# Patient Record
Sex: FEMALE | Race: BLACK OR AFRICAN AMERICAN | NOT HISPANIC OR LATINO | Employment: FULL TIME | URBAN - METROPOLITAN AREA
[De-identification: names, ages, dates, MRNs, and addresses within clinical notes are randomized per-mention and may not be internally consistent; named-entity substitution may affect disease eponyms.]

---

## 2017-01-03 ENCOUNTER — PATIENT OUTREACH (OUTPATIENT)
Dept: ADMINISTRATIVE | Facility: CLINIC | Age: 52
End: 2017-01-03
Payer: COMMERCIAL

## 2017-01-03 NOTE — PATIENT INSTRUCTIONS
Pneumonia (Adult)  Pneumonia is an infection deep within the lungs. It is in the small air sacs (alveoli). Pneumonia may be caused by a virus or bacteria. Pneumonia caused by bacteria is usually treated with an antibiotic. Severe cases may need to be treated in the hospital. Milder cases can be treated at home. Symptoms usually start to get better during the first 2 days of treatment.    Home care  Follow these guidelines when caring for yourself at home:  · Rest at home for the first 2 to 3 days, or until you feel stronger. Dont let yourself get overly tired when you go back to your activities.  · Stay away from cigarette smoke - yours or other peoples.  · You may use acetaminophen or ibuprofen to control fever or pain, unless another medicine was prescribed. If you have chronic liver or kidney disease, talk with your health care provider before using these medicines. Also talk with your provider if youve had a stomach ulcer or GI bleeding. Dont give aspirin to anyone younger than 18 years of age who is ill with a fever. It may cause severe liver damage.  · Your appetite may be poor, so a light diet is fine.  · Drink 6 to 8 glasses of fluids every day to make sure you are getting enough fluids. Beverages can include water, sport drinks, sodas without caffeine, juices, tea, or soup. Fluids will help loosen secretions in the lung. This will make it easier for you to cough up the phlegm (sputum). If you also have heart or kidney disease, check with your health care provider before you drink extra fluids.  · Take antibiotic medicine prescribed until it is all gone, even if you are feeling better after a few days.  Follow-up care  Follow up with your health care provider in the next 2 to 3 days, or as advised. This is to be sure the medicine is helping you get better.  If you are 65 or older, you should get a pneumococcal vaccine and a yearly flu (influenza) shot. You should also get these vaccines if you have  chronic lung disease like asthma, emphysema, or COPD. Ask your provider about this.  When to seek medical advice  Call your health care provider right away if any of these occur:  · You dont get better within the first 48 hours of treatment  · Shortness of breath gets worse  · Rapid breathing (more than 25 breaths per minute)  · Coughing up blood  · Chest pain gets worse with breathing  · Fever of 102°F (38°C) or higher that doesnt get better with fever medicine  · Weakness, dizziness, or fainting that gets worse  · Thirst or dry mouth that gets worse  · Sinus pain, headache, or a stiff neck  · Chest pain not caused by coughing  © 1493-5588 [employer]. [address] [place] [address], [place] [address]. All rights reserved. This information is not intended as a substitute for professional medical care. Always follow your healthcare professional's instructions.

## 2017-01-05 ENCOUNTER — TELEPHONE (OUTPATIENT)
Dept: INTERNAL MEDICINE | Facility: CLINIC | Age: 52
End: 2017-01-05

## 2017-01-05 NOTE — TELEPHONE ENCOUNTER
Please forward this important TCC information to your provider in order to maximize the post discharge care delivery of this patient.     C3 nurse spoke with Pastora El Cipriano for a TCC post hospital discharge follow up call. The patient has a scheduled HOSFU appointment with Dr. Koch on 01/09/17 @ 1100.       Respectfully,   Ashley Ceron, YU   [facility]     [email or web address]       Pt has 2 wk f/u with Dr. Koch    Please auth if nec

## 2017-01-09 ENCOUNTER — OFFICE VISIT (OUTPATIENT)
Dept: INTERNAL MEDICINE | Facility: CLINIC | Age: 52
End: 2017-01-09
Attending: INTERNAL MEDICINE
Payer: COMMERCIAL

## 2017-01-09 VITALS
SYSTOLIC BLOOD PRESSURE: 128 MMHG | WEIGHT: 150.56 LBS | HEIGHT: 65 IN | DIASTOLIC BLOOD PRESSURE: 78 MMHG | OXYGEN SATURATION: 97 % | BODY MASS INDEX: 25.08 KG/M2 | HEART RATE: 114 BPM

## 2017-01-09 DIAGNOSIS — J91.8 PARAPNEUMONIC EFFUSION: Primary | ICD-10-CM

## 2017-01-09 DIAGNOSIS — J18.9 PARAPNEUMONIC EFFUSION: Primary | ICD-10-CM

## 2017-01-09 DIAGNOSIS — J15.9 COMMUNITY ACQUIRED BACTERIAL PNEUMONIA: ICD-10-CM

## 2017-01-09 PROCEDURE — 99999 PR PBB SHADOW E&M-EST. PATIENT-LVL III: CPT | Mod: PBBFAC,,, | Performed by: INTERNAL MEDICINE

## 2017-01-09 PROCEDURE — 99213 OFFICE O/P EST LOW 20 MIN: CPT | Mod: S$GLB,,, | Performed by: INTERNAL MEDICINE

## 2017-01-09 PROCEDURE — 1159F MED LIST DOCD IN RCRD: CPT | Mod: S$GLB,,, | Performed by: INTERNAL MEDICINE

## 2017-01-09 PROCEDURE — 3078F DIAST BP <80 MM HG: CPT | Mod: S$GLB,,, | Performed by: INTERNAL MEDICINE

## 2017-01-09 PROCEDURE — 3074F SYST BP LT 130 MM HG: CPT | Mod: S$GLB,,, | Performed by: INTERNAL MEDICINE

## 2017-01-09 RX ORDER — AMLODIPINE BESYLATE 5 MG/1
TABLET ORAL
COMMUNITY
Start: 2017-01-05 | End: 2017-01-26

## 2017-01-09 NOTE — MR AVS SNAPSHOT
[facility]  [address]  [facility] [address]  Phone: [phone]  Fax: [phone]                  Pastora Cota   2017 11:00 AM   Office Visit    Description:  Female : 1965   Provider:  Kiko Koch MD   Department:  [facility]           Reason for Visit     Cough           Diagnoses this Visit        Comments    Need for hepatitis C screening test    -  Primary     Pap smear for cervical cancer screening         Hyperlipidemia, unspecified hyperlipidemia type                To Do List           Future Appointments        Provider Department Dept Phone    3/6/2017 5:00 PM LAB, APPOINTMENT [facility] [phone]      Your Future Surgeries/Procedures     2017   Surgery with Jhonny Clayton MD   [facility] ([facility])    [address] [facility] [address] [phone]              Goals (5 Years of Data)     None      [facility]     [facility] Nurse Care Line -  Assistance  Registered nurses in the [facility] provide clinical advisement, health education, appointment booking, and other advisory services.  Call for this free service at [phone].             Medications           Message regarding Medications     Verify the changes and/or additions to your medication regime listed below are the same as discussed with your clinician today.  If any of these changes or additions are incorrect, please notify your healthcare provider.             Verify that the below list of medications is an accurate representation of the medications you are currently taking.  If none reported, the list may be blank. If incorrect, please contact your healthcare provider. Carry this list with you in case of emergency.           Current Medications     amlodipine (NORVASC) 10 MG tablet Take 1 tablet (10 mg total) by mouth once daily.    amlodipine  "(NORVASC) 5 MG tablet     hydrocortisone butyrate (LOCOID) 0.1 % Crea cream AAA face bid    lactobacillus acidophilus & bulgar (LACTINEX) 100 million cell packet Take 1 packet (1 each total) by mouth 2 (two) times daily.    moxifloxacin (AVELOX) 400 mg tablet Take 1 tablet (400 mg total) by mouth once daily.    triamcinolone acetonide 0.1% (KENALOG) 0.1 % cream AAA bid    epinephrine (EPIPEN) 0.3 mg/0.3 mL AtIn Inject 0.3 mLs (0.3 mg total) into the muscle as needed (extreme swelling, difficulty swallowing, voice changes or trouble breathing).           Clinical Reference Information           Vital Signs - Last Recorded  Most recent update: 1/9/2017 10:59 AM by Daniela Taylor MA    BP Pulse Ht Wt SpO2 BMI    128/78 (!) 114 5' 5" (1.651 m) 68.3 kg (150 lb 9.2 oz) 97% 25.06 kg/m2      Blood Pressure          Most Recent Value    BP  128/78      Allergies as of 1/9/2017     Augmentin [Amoxicillin-pot Clavulanate]      Immunizations Administered on Date of Encounter - 1/9/2017     None      "

## 2017-01-09 NOTE — PROGRESS NOTES
"Subjective:       Patient ID: Pastora Cota is a 51 y.o. female.    Chief Complaint: Cough    HPI Comments: Here for hospital f/u    Admitted 12/19/16-12/30/16 for CAP with hypoxemia and para pneumonic effusion s/p thoracentesis and chest tube placement. Discharged on 2 week course of moxifloxacin   Continues to get some pain with coughing. Had a fever a few days after discharge but has not had any since. Had appointment with Dr. Mcbride and had CXR and there is some residual fluid but no signs of re accumulation. Gets fatigued with. Has f/u in 9 days to see Dr. Mcbride for f/u after completion of antibiotics. She request today that I fill out disability paperwork for her for recent hospitalization.       Review of Systems       Objective:      Vitals:    01/09/17 1056   BP: 128/78   Pulse: (!) 114   SpO2: 97%   Weight: 68.3 kg (150 lb 9.2 oz)   Height: 5' 5" (1.651 m)      Physical Exam   Constitutional: She is oriented to person, place, and time. She appears well-developed and well-nourished. No distress.   HENT:   Head: Normocephalic and atraumatic.   Mouth/Throat: Oropharynx is clear and moist. No oropharyngeal exudate.   Eyes: Conjunctivae and EOM are normal. Pupils are equal, round, and reactive to light. No scleral icterus.   Neck: No thyromegaly present.   Cardiovascular: Normal rate, regular rhythm and normal heart sounds.    No murmur heard.  Pulmonary/Chest: Effort normal and breath sounds normal. No accessory muscle usage. No respiratory distress. She has no decreased breath sounds. She has no wheezes. She has no rales.   Abdominal: Soft. She exhibits no distension. There is no tenderness.   Musculoskeletal: She exhibits no edema or tenderness.   Lymphadenopathy:     She has no cervical adenopathy.   Neurological: She is alert and oriented to person, place, and time.   Skin: Skin is warm and dry.   Psychiatric: She has a normal mood and affect. Her behavior is normal.       Assessment:       1. " Parapneumonic effusion    2. Community acquired bacterial pneumonia        Plan:       Pastora was seen today for cough.    Diagnoses and all orders for this visit:    Parapneumonic effusion  -s/p thoracentesis. No signs of reacumulation. F/u with pulm as scheduled    Community acquired bacterial pneumonia  Improving. Complete Abx.             Side effects of medication(s) were discussed in detail and patient voiced understanding.  Patient will call back for any issues or complications.

## 2017-01-26 ENCOUNTER — OFFICE VISIT (OUTPATIENT)
Dept: INTERNAL MEDICINE | Facility: CLINIC | Age: 52
End: 2017-01-26
Attending: INTERNAL MEDICINE
Payer: COMMERCIAL

## 2017-01-26 ENCOUNTER — HOSPITAL ENCOUNTER (OUTPATIENT)
Dept: CARDIOLOGY | Facility: OTHER | Age: 52
Discharge: HOME | End: 2017-01-26
Attending: INTERNAL MEDICINE
Payer: COMMERCIAL

## 2017-01-26 VITALS
HEART RATE: 113 BPM | HEIGHT: 65 IN | SYSTOLIC BLOOD PRESSURE: 136 MMHG | OXYGEN SATURATION: 96 % | BODY MASS INDEX: 24.98 KG/M2 | WEIGHT: 149.94 LBS | DIASTOLIC BLOOD PRESSURE: 88 MMHG

## 2017-01-26 DIAGNOSIS — D50.9 MICROCYTIC ANEMIA: Primary | ICD-10-CM

## 2017-01-26 DIAGNOSIS — J15.9 COMMUNITY ACQUIRED BACTERIAL PNEUMONIA: ICD-10-CM

## 2017-01-26 DIAGNOSIS — J91.8 PARAPNEUMONIC EFFUSION: ICD-10-CM

## 2017-01-26 DIAGNOSIS — R00.0 TACHYCARDIA: ICD-10-CM

## 2017-01-26 DIAGNOSIS — J18.9 PARAPNEUMONIC EFFUSION: ICD-10-CM

## 2017-01-26 PROCEDURE — 99999 PR PBB SHADOW E&M-EST. PATIENT-LVL III: CPT | Mod: PBBFAC,,, | Performed by: INTERNAL MEDICINE

## 2017-01-26 PROCEDURE — 1159F MED LIST DOCD IN RCRD: CPT | Mod: S$GLB,,, | Performed by: INTERNAL MEDICINE

## 2017-01-26 PROCEDURE — 93005 ELECTROCARDIOGRAM TRACING: CPT

## 2017-01-26 PROCEDURE — 93010 ELECTROCARDIOGRAM REPORT: CPT | Mod: ,,, | Performed by: INTERNAL MEDICINE

## 2017-01-26 PROCEDURE — 3079F DIAST BP 80-89 MM HG: CPT | Mod: S$GLB,,, | Performed by: INTERNAL MEDICINE

## 2017-01-26 PROCEDURE — 99214 OFFICE O/P EST MOD 30 MIN: CPT | Mod: S$GLB,,, | Performed by: INTERNAL MEDICINE

## 2017-01-26 PROCEDURE — 3075F SYST BP GE 130 - 139MM HG: CPT | Mod: S$GLB,,, | Performed by: INTERNAL MEDICINE

## 2017-01-26 RX ORDER — MOXIFLOXACIN HYDROCHLORIDE 400 MG/1
TABLET ORAL
COMMUNITY
Start: 2017-01-18 | End: 2017-02-16

## 2017-01-26 NOTE — PROGRESS NOTES
"Subjective:       Patient ID: Pastora Cota is a 51 y.o. female.    Chief Complaint: Pneumonia    HPI Comments: Here for f/u    Seen by me on 1/9/17 for hospital f/u after para pneumonic effusion requiring thoracotomy. After seeing me she developed low grade fevers and had f/u with Dr. Mcbride on 1/18/17 and started on another round of Abx which she completed yesterday. She still has some pain with coughing but overall breathing improved and no longer having fevers. She reports continued fatigue due to hospitalization. Dr. Mcbride gave her okay for light walking to work on strengthening.      Review of Systems    Objective:      Vitals:    01/26/17 1325   BP: 136/88   Pulse: (!) 113   SpO2: 96%   Weight: 68 kg (149 lb 14.6 oz)   Height: 5' 5" (1.651 m)      Physical Exam   Constitutional: She is oriented to person, place, and time. She appears well-developed and well-nourished. She does not have a sickly appearance. No distress.   HENT:   Head: Normocephalic and atraumatic.   Eyes: Conjunctivae and EOM are normal. Right eye exhibits no discharge. Left eye exhibits no discharge. No scleral icterus.   Pulmonary/Chest: Effort normal. No respiratory distress.   Abdominal: Normal appearance. She exhibits no distension.   Neurological: She is alert and oriented to person, place, and time.   Skin: Skin is warm and dry. She is not diaphoretic.   Psychiatric: She has a normal mood and affect. Her speech is normal.       Assessment:       1. Microcytic anemia    2. Tachycardia    3. Parapneumonic effusion    4. Community acquired bacterial pneumonia        Plan:       Pastora was seen today for pneumonia.    Diagnoses and all orders for this visit:    Microcytic anemia  -she is due for colonoscopy but she postponed this due to recent events  -     Ferritin; Future  -     Iron and TIBC; Future  -     CBC auto differential; Future  -     Reticulocytes; Future  -     Lactate dehydrogenase; Future  -     HAPTOGLOBIN; " Future    Tachycardia  -     SCHEDULED EKG 12-LEAD (to Muse); Future    Parapneumonic effusion  -out of work extended    Community acquired bacterial pneumonia           Side effects of medication(s) were discussed in detail and patient voiced understanding.  Patient will call back for any issues or complications.

## 2017-01-26 NOTE — MR AVS SNAPSHOT
[facility]  [address]  [place] [address]  Phone: [phone]  Fax: [phone]                  Pastora Cota   2017 1:20 PM   Office Visit    Description:  Female : 1965   Provider:  Kiko Koch MD   Department:  [facility]           Reason for Visit     Pneumonia           Diagnoses this Visit        Comments    Microcytic anemia    -  Primary     Tachycardia                To Do List           Future Appointments        Provider Department Dept Phone    3/6/2017 5:00 PM LAB, APPOINTMENT [facility] [phone]      Goals (5 Years of Data)     None      [facility]     [facility] Nurse Care Line -  Assistance  Registered nurses in the [facility] provide clinical advisement, health education, appointment booking, and other advisory services.  Call for this free service at [phone].             Medications           Message regarding Medications     Verify the changes and/or additions to your medication regime listed below are the same as discussed with your clinician today.  If any of these changes or additions are incorrect, please notify your healthcare provider.        STOP taking these medications     lactobacillus acidophilus & bulgar (LACTINEX) 100 million cell packet Take 1 packet (1 each total) by mouth 2 (two) times daily.           Verify that the below list of medications is an accurate representation of the medications you are currently taking.  If none reported, the list may be blank. If incorrect, please contact your healthcare provider. Carry this list with you in case of emergency.           Current Medications     amlodipine (NORVASC) 10 MG tablet Take 1 tablet (10 mg total) by mouth once daily.    epinephrine (EPIPEN) 0.3 mg/0.3 mL AtIn Inject 0.3 mLs (0.3 mg total) into the muscle as needed (extreme swelling, difficulty swallowing, voice changes or  "trouble breathing).    hydrocortisone butyrate (LOCOID) 0.1 % Crea cream AAA face bid    moxifloxacin (AVELOX) 400 mg tablet     triamcinolone acetonide 0.1% (KENALOG) 0.1 % cream AAA bid           Clinical Reference Information           Vital Signs - Last Recorded  Most recent update: 1/26/2017  1:28 PM by Daniela Taylor MA    BP Pulse Ht Wt SpO2 BMI    136/88 (!) 113 5' 5" (1.651 m) 68 kg (149 lb 14.6 oz) 96% 24.95 kg/m2      Blood Pressure          Most Recent Value    BP  136/88      Allergies as of 1/26/2017     Augmentin [Amoxicillin-pot Clavulanate]      Immunizations Administered on Date of Encounter - 1/26/2017     None      Orders Placed During Today's Visit     Future Labs/Procedures Expected by Expires    CBC auto differential  1/26/2017 1/26/2018    Ferritin  1/26/2017 4/26/2017    HAPTOGLOBIN  1/26/2017 3/27/2018    Iron and TIBC  1/26/2017 4/26/2017    Lactate dehydrogenase  1/26/2017 3/27/2018    Reticulocytes  1/26/2017 3/27/2018    SCHEDULED EKG 12-LEAD (to Muse)  As directed 1/26/2018      "

## 2017-02-14 ENCOUNTER — OFFICE VISIT (OUTPATIENT)
Dept: ALLERGY | Facility: CLINIC | Age: 52
End: 2017-02-14
Payer: COMMERCIAL

## 2017-02-14 VITALS
HEIGHT: 65 IN | WEIGHT: 147 LBS | BODY MASS INDEX: 24.49 KG/M2 | TEMPERATURE: 99 F | DIASTOLIC BLOOD PRESSURE: 78 MMHG | SYSTOLIC BLOOD PRESSURE: 128 MMHG

## 2017-02-14 DIAGNOSIS — L30.9 DERMATITIS: Primary | ICD-10-CM

## 2017-02-14 PROCEDURE — 3078F DIAST BP <80 MM HG: CPT | Mod: S$GLB,,, | Performed by: ALLERGY & IMMUNOLOGY

## 2017-02-14 PROCEDURE — 3074F SYST BP LT 130 MM HG: CPT | Mod: S$GLB,,, | Performed by: ALLERGY & IMMUNOLOGY

## 2017-02-14 PROCEDURE — 99214 OFFICE O/P EST MOD 30 MIN: CPT | Mod: S$GLB,,, | Performed by: ALLERGY & IMMUNOLOGY

## 2017-02-14 PROCEDURE — 99999 PR PBB SHADOW E&M-EST. PATIENT-LVL II: CPT | Mod: PBBFAC,,, | Performed by: ALLERGY & IMMUNOLOGY

## 2017-02-14 NOTE — PROGRESS NOTES
Pastora Cota returns to clinic today for continued evaluation of a rash. She is here with a friend, Dora. She was last seen December 5, 2016.    After her last visit, she continued to use the cream that Dr. Oh gave her. The rash did resolve after about 2 weeks.    She also diagnosed her with seborrheic dermatitis.    She has not had any further urticaria or angioedema.    Last Tuesday she ate crawfish, crab claws, and ate gumbo that contained shrimp and crab.    On Friday she ate seafood pasta that contained shrimp.    On Saturday she ate leftovers of this.    On Sunday she has some slight itching of her face.    On Monday this increased when she was at work and she took 2 Benadryl.    This morning around 1:30 AM she woke up with burning and itching of her face. It was erythematous and swollen. She took 2 Benadryl and it improved.    She denies any rhinitis or conjunctivitis.    She denies any cough, wheezing, or shortness of breath.    [facility] PEQ ALLERGY QUESTIONNAIRE SHORT 2/14/2017   Are you taking any new medications since your last visit? No   Constitution: Appetite change   Head or facial pain: Headaches   Eyes: Itching   Ears: Itching   Nose: No symptoms   Throat: No symptoms   Sinuses: No symptoms   Lungs: No symptoms   Skin: Itching, Hives, Swelling   Cardiovascular: No symptoms   Gastrointestinal: No symptoms   Genital/ urinary No symptoms   Musculoskeletal: No symptoms   Neurologic: Headaches   Endocrine: Headaches   Hematologic: No symptoms      Physical Examination:  General: Well-developed, well-nourished, no acute distress.  Head: No sinus tenderness.  Eyes: Conjunctivae:  No bulbar or palpebral conjunctival injection.  Ears: EAC's clear.  TM's clear.  No pre-auricular nodes.  Nose: Nasal Mucosa:  Pink.  Septum: No apparent deviation.  Turbinates:  No significant edema.  Polyps/Mass:  None visible.  Teeth/Gums:  No bleeding noted.  Oropharynx: No exudates.  Neck: Supple without  thyromegaly. No cervical lymphadenopathy.    Respiratory/Chest: Effort: Good.  Auscultation:  Clear bilaterally.  Skin: Good turgor.  No urticaria or angioedema. Erythematous raised rash across face.  Neuro/Psych: Oriented x 3.    Laboratory 11/28/2016:  IgE level: Less than 35.  ImmunoCAP: Negative.  Thyroid peroxidase antibody level: Less than 6.0.  Thyroglobulin antibody level: Less than 4.0.  Vitamin D level: 33.    Assessment:  1. Seborrheic dermatitis, doubt food allergy.  2. Consider drug reaction, Augmentin.  3. Angioedema of uncertain etiology, consider secondary to ACE inhibitor.  4. Chronic rhinitis, doubt allergic.  5. Hypertension on hydrochlorothiazide and amlodipine.  6. Hyperlipidemia.  7. Colitis on sulfasalazine.  8. Multinodular thyroid disease.  9. Migraine headaches.    Recommendations:  1. Dermatology follow-up.  2. She may restart creams prescribed by Dr. Oh until then.  3. Benadryl as needed.  4. Consider penicillin skin testing in the future.  5. Consider inhalant skin testing in the future if rhinitis persists.  6. Laboratory as ordered. [facility] will [facility].  7. Return to clinic as needed.

## 2017-02-16 ENCOUNTER — OFFICE VISIT (OUTPATIENT)
Dept: DERMATOLOGY | Facility: CLINIC | Age: 52
End: 2017-02-16
Payer: COMMERCIAL

## 2017-02-16 DIAGNOSIS — R21 RASH AND NONSPECIFIC SKIN ERUPTION: Primary | ICD-10-CM

## 2017-02-16 PROCEDURE — 99213 OFFICE O/P EST LOW 20 MIN: CPT | Mod: 25,S$GLB,, | Performed by: PATHOLOGY

## 2017-02-16 PROCEDURE — 11100 PR BIOPSY OF SKIN LESION: CPT | Mod: S$GLB,,, | Performed by: PATHOLOGY

## 2017-02-16 PROCEDURE — 11101 PR BIOPSY, EACH ADDED LESION: CPT | Mod: S$GLB,,, | Performed by: PATHOLOGY

## 2017-02-16 PROCEDURE — 99999 PR PBB SHADOW E&M-EST. PATIENT-LVL III: CPT | Mod: PBBFAC,,, | Performed by: PATHOLOGY

## 2017-02-16 PROCEDURE — 88305 TISSUE EXAM BY PATHOLOGIST: CPT | Performed by: PATHOLOGY

## 2017-02-16 RX ORDER — PREDNISONE 20 MG/1
TABLET ORAL
Qty: 30 TABLET | Refills: 0 | Status: SHIPPED | OUTPATIENT
Start: 2017-02-16 | End: 2017-09-20

## 2017-02-16 NOTE — LETTER
February 16, 2017      ETELVINA Benitez III, MD  [address]  [place] [address]           [facility]  [address]  [place] [address]  Phone: [phone]  Fax: [phone]          Patient: Pastora Cota   MR Number: [id number]   YOB: 1965   Date of Visit: 2/16/2017       Dear Dr. ETELVINA Benitez III:    Thank you for referring Pastora Cota to me for evaluation. Attached you will find relevant portions of my assessment and plan of care.    If you have questions, please do not hesitate to call me. I look forward to following Pastora Cota along with you.    Sincerely,    Alyson Bernardo MD    Enclosure  CC:  No Recipients    If you would like to receive this communication electronically, please contact [email or web address] or [phone] to request more information on [facility] access.    For providers and/or their staff who would like to refer a patient to [facility], please contact us through our one-stop-shop provider referral line, [facility], at [phone].    If you feel you have received this communication in error or would no longer like to receive these types of communications, please e-mail [email or web address]

## 2017-02-16 NOTE — PROGRESS NOTES
Subjective:       Patient ID:  Pastora Cota is a 51 y.o. female who presents for   Chief Complaint   Patient presents with    Rash     Face and arms, x 2 days, swelling, redness, itching, benadryl for treatment      Rash  - Initial  Affected locations: face, left arm and right arm  Duration: 2 days  Signs / symptoms: swelling, itching and redness  Aggravated by: nothing  Treatments tried: Benadryl   Improvement on treatment: mild    Pt with h/o suspected drug eruption secondary to Augmentin per Dr. Oh's notes - cleared on topical and systemic steroids.  Also h/o urticaria and angioedema - seen by Dr. Benitez who does not believe this to be related to a food allergy.  Of note, pt on amlodipine for about 4 years.  No new exposures, personal care products, hair dye, etc.  No new meds.  Current flare started Tuesday morning to face - has spread to arms, neck, upper chest, lower legs.  +pruritus, redness, swelling.      Review of Systems   Constitutional: Negative for fever, chills, weight loss, weight gain, fatigue, night sweats and malaise.   Skin: Positive for itching, rash, dry skin, daily sunscreen use and activity-related sunscreen use. Negative for recent sunburn.   Hematologic/Lymphatic: Does not bruise/bleed easily.        Objective:    Physical Exam   Constitutional: She appears well-developed and well-nourished.   Neurological: She is alert.   Skin:   Areas Examined (abnormalities noted in diagram):   Scalp / Hair Palpated and Inspected  Head / Face Inspection Performed  Neck Inspection Performed  Chest / Axilla Inspection Performed  Abdomen Inspection Performed  Back Inspection Performed  RUE Inspected  LUE Inspection Performed  RLE Inspected  LLE Inspection Performed                   Diagram Legend     Erythematous scaling macule/papule c/w actinic keratosis       Vascular papule c/w angioma      Pigmented verrucoid papule/plaque c/w seborrheic keratosis      Yellow umbilicated papule c/w  sebaceous hyperplasia      Irregularly shaped tan macule c/w lentigo     1-2 mm smooth white papules consistent with Milia      Movable subcutaneous cyst with punctum c/w epidermal inclusion cyst      Subcutaneous movable cyst c/w pilar cyst      Firm pink to brown papule c/w dermatofibroma      Pedunculated fleshy papule(s) c/w skin tag(s)      Evenly pigmented macule c/w junctional nevus     Mildly variegated pigmented, slightly irregular-bordered macule c/w mildly atypical nevus      Flesh colored to evenly pigmented papule c/w intradermal nevus       Pink pearly papule/plaque c/w basal cell carcinoma      Erythematous hyperkeratotic cursted plaque c/w SCC      Surgical scar with no sign of skin cancer recurrence      Open and closed comedones      Inflammatory papules and pustules      Verrucoid papule consistent consistent with wart     Erythematous eczematous patches and plaques     Dystrophic onycholytic nail with subungual debris c/w onychomycosis     Umbilicated papule    Erythematous-base heme-crusted tan verrucoid plaque consistent with inflamed seborrheic keratosis     Erythematous Silvery Scaling Plaque c/w Psoriasis     See annotation      Assessment / Plan:   Rule Out - Biopsy 1: Autoeczematization Reaction versus Allergic Contact Dermatitis versus Allergic Dermatitis versus Drug Eruption versus Other.    Rule Out - Biopsy 2: Autoeczematization Reaction versus Allergic Contact Dermatitis versus Allergic Dermatitis versus Drug Eruption versus Other.        Pathology Orders:      Normal Orders This Visit    Tissue Specimen To Pathology, Dermatology     Questions:    Directional Terms:  Other(comment)    Right    Clinical information:  papular erythematous, eczematous appearing eruption; r/o drug eruption vs allergic contact dermatitis vs other    Specific Site:  forearm    Tissue Specimen To Pathology, Dermatology     Questions:    Directional Terms:  Other(comment)    Left    Lower    Clinical  information:  papular erythematous, eczematous appearing eruption with purpuric component; r/o drug eruption vs allergic contact dermatitis vs other    Specific Site:  leg        Rash and nonspecific skin eruption  -     Tissue Specimen To Pathology, Dermatology  -     Tissue Specimen To Pathology, Dermatology  -     predniSONE (DELTASONE) 20 MG tablet; Take 3 pills po qam with breakfast x 5 days, take 2 po qam with breakfast x 5 days, then take 1 po qam with breakfast x 5 days  Dispense: 30 tablet; Refill: 0  - zyrtec or claritin qAM  - 25-50 mg benadryl qhs    Punch biopsy procedure note:  Punch biopsies x 2 performed after verbal consent obtained. Area marked and prepped with alcohol. Approximately 1cc of 1% lidocaine with epinephrine injected. 3 mm disposable punch used to remove lesion. Hemostasis obtained and biopsy site closed with 1 - 2 Prolene sutures. Wound care instructions reviewed with patient and handout given.           Return in about 2 weeks (around 3/2/2017) for suture removal.

## 2017-02-16 NOTE — MR AVS SNAPSHOT
[facility]at[facility]  [address]  [place] [address]  Phone: [phone]  Fax: [phone]                  Pastora Cota   2017 10:40 AM   Office Visit    Description:  Female : 1965   Provider:  Alyson Bernardo MD   Department:  [facility]           Reason for Visit     Rash           Diagnoses this Visit        Comments    Rash and nonspecific skin eruption    -  Primary            To Do List           Future Appointments        Provider Department Dept Phone    3/2/2017 12:40 PM Alyson Bernardo MD [facility] [phone]    3/6/2017 5:00 PM LAB, APPOINTMENT [facility] [phone]      Goals (5 Years of Data)     None      Follow-Up and Disposition     Return in about 2 weeks (around 3/2/2017) for suture removal.    Follow-up and Disposition History       These Medications        Disp Refills Start End    predniSONE (DELTASONE) 20 MG tablet 30 tablet 0 2017     Take 3 pills po qam with breakfast x 5 days, take 2 po qam with breakfast x 5 days, then take 1 po qam with breakfast x 5 days    Pharmacy: [address] #: [phone]         [facility] On [facility]     [facility] On [facility] Nurse Care Line -  Assistance  Registered nurses in the [facility] Center provide clinical advisement, health education, appointment booking, and other advisory services.  Call for this free service at [phone].             Medications           Message regarding Medications     Verify the changes and/or additions to your medication regime listed below are the same as discussed with your clinician today.  If any of these changes or additions are incorrect, please notify your healthcare provider.        START taking these NEW medications        Refills    predniSONE (DELTASONE) 20 MG tablet 0    Sig: Take 3 pills po qam with breakfast x 5 days, take 2 po  qam with breakfast x 5 days, then take 1 po qam with breakfast x 5 days    Class: Normal      STOP taking these medications     moxifloxacin (AVELOX) 400 mg tablet            Verify that the below list of medications is an accurate representation of the medications you are currently taking.  If none reported, the list may be blank. If incorrect, please contact your healthcare provider. Carry this list with you in case of emergency.           Current Medications     amlodipine (NORVASC) 10 MG tablet Take 1 tablet (10 mg total) by mouth once daily.    epinephrine (EPIPEN) 0.3 mg/0.3 mL AtIn Inject 0.3 mLs (0.3 mg total) into the muscle as needed (extreme swelling, difficulty swallowing, voice changes or trouble breathing).    hydrocortisone butyrate (LOCOID) 0.1 % Crea cream AAA face bid    triamcinolone acetonide 0.1% (KENALOG) 0.1 % cream AAA bid    predniSONE (DELTASONE) 20 MG tablet Take 3 pills po qam with breakfast x 5 days, take 2 po qam with breakfast x 5 days, then take 1 po qam with breakfast x 5 days           Clinical Reference Information           Allergies as of 2/16/2017     Augmentin [Amoxicillin-pot Clavulanate]      Immunizations Administered on Date of Encounter - 2/16/2017     None      Orders Placed During Today's Visit      Normal Orders This Visit    Tissue Specimen To Pathology, Dermatology     Tissue Specimen To Pathology, Dermatology       Instructions    Punch Biopsy Wound Care    Your doctor has performed a punch biopsy today.  A band aid and antibiotic ointment has been placed over the site.  This should remain in place for 24 hours.  It is recommended that you keep the area dry for the first 24 hours.  After 24 hours, you may remove the band aid and wash the area with warm soap and water and apply Vaseline jelly.  Many patients prefer to use Neosporin or Bacitracin ointment.  This is acceptable; however know that you can develop an allergy to this medication even if you have used it  safely for years.  It is important to keep the area moist.  Letting it dry out and get air slows healing time, will worsen the scar, and make it more difficult to remove the stitches if they were placed.  Band aid is optional after first 24 hours.      If you notice increasing redness, tenderness, pain, or yellow drainage at the biopsy or surgical site, please notify your doctor.  These are signs of an infection.    If your biopsy/surgical site is bleeding, apply firm pressure for 15 minutes straight.  Repeat for another 15 minutes, if it is still bleeding.   If the surgical site continues to bleed, then please contact your doctor.      [address] [place] [address]/ [phone] FAX/ [email or web address]              Language Assistance Services     ATTENTION: Language assistance services are available, free of charge. Please call [phone].      ATENCIÓN: Si habla español, tiene a tomlinson disposición servicios gratuitos de asistencia lingüística. Llame al [phone].     LILIBETH Ý: N?u b?n nói Ti?ng Vi?t, có các d?ch v? h? tr? ngôn ng? mi?n phí dành cho b?n. G?i s? [phone].         [facility] complies with applicable Federal civil rights laws and does not discriminate on the basis of race, color, national origin, age, disability, or sex.

## 2017-02-16 NOTE — PATIENT INSTRUCTIONS
Punch Biopsy Wound Care    Your doctor has performed a punch biopsy today.  A band aid and antibiotic ointment has been placed over the site.  This should remain in place for 24 hours.  It is recommended that you keep the area dry for the first 24 hours.  After 24 hours, you may remove the band aid and wash the area with warm soap and water and apply Vaseline jelly.  Many patients prefer to use Neosporin or Bacitracin ointment.  This is acceptable; however know that you can develop an allergy to this medication even if you have used it safely for years.  It is important to keep the area moist.  Letting it dry out and get air slows healing time, will worsen the scar, and make it more difficult to remove the stitches if they were placed.  Band aid is optional after first 24 hours.      If you notice increasing redness, tenderness, pain, or yellow drainage at the biopsy or surgical site, please notify your doctor.  These are signs of an infection.    If your biopsy/surgical site is bleeding, apply firm pressure for 15 minutes straight.  Repeat for another 15 minutes, if it is still bleeding.   If the surgical site continues to bleed, then please contact your doctor.      [address] [place] [address]/ [phone] FAX/ [email or web address]

## 2017-03-02 ENCOUNTER — OFFICE VISIT (OUTPATIENT)
Dept: DERMATOLOGY | Facility: CLINIC | Age: 52
End: 2017-03-02
Payer: COMMERCIAL

## 2017-03-02 DIAGNOSIS — L23.9 ALLERGIC CONTACT DERMATITIS, UNSPECIFIED TRIGGER: Primary | ICD-10-CM

## 2017-03-02 PROCEDURE — 99499 UNLISTED E&M SERVICE: CPT | Mod: S$GLB,,, | Performed by: PATHOLOGY

## 2017-03-02 PROCEDURE — 99999 PR PBB SHADOW E&M-EST. PATIENT-LVL I: CPT | Mod: PBBFAC,,, | Performed by: PATHOLOGY

## 2017-03-02 NOTE — PROGRESS NOTES
Subjective:       Patient ID:  Pastora Cota is a 51 y.o. female who presents for   Chief Complaint   Patient presents with    Biopsy     Biopsy results     HPI  Pt here for suture removal and biopsy results:  FINAL PATHOLOGIC DIAGNOSIS  1. Skin, right forearm, punch biopsy:  - SPONGIOTIC DERMATITIS (See Comment).  MICROSCOPIC DESCRIPTION: The biopsy shows parakeratosis, epidermal spongiosis with exocytosis of  lymphocytes and formation of intraepidermal Langerhans cell microvesicles. There is a superficial perivascular and  perifollicular dermal lymphohistiocytic infiltrate with rare interstitial eosinophils. Multiple levels were examined.  2. Skin, left lower leg, punch biopsy:  - SUPERFICIAL PERIVASCULAR DERMATITIS (SEE COMMENT).  MICROSCOPIC DESCRIPTION: The biopsy shows minimal focal spongiosis with overlying parakeratosis. The  remainder of the stratum corneum consists of basketweave orthokeratosis. Within the underlying superficial  dermis, there is a perivascular and perifollicular lymphohistiocytic infiltrate with focal erythrocyte extravasation and  rare eosinophils. There is no evidence of vasculitis identified in these sections. Multiple levels were examined.  COMMENT: The histologic features of both specimens are similar, and therefore, they will be discussed together.  Differential diagnosis includes an eczematous process such as an allergic contact dermatitis , nummular dermatitis  or id reaction. A drug eruption cannot be excluded. Clinical correlation is advised.    Review of Systems   Constitutional: Negative for fever, chills, weight loss, weight gain, fatigue, night sweats and malaise.   Skin: Negative for itching, rash, daily sunscreen use, activity-related sunscreen use and recent sunburn.   Hematologic/Lymphatic: Does not bruise/bleed easily.        Objective:    Physical Exam   Constitutional: She appears well-developed and well-nourished.   Neurological: She is alert.   Skin:    Areas Examined (abnormalities noted in diagram):   Scalp / Hair Palpated and Inspected  Head / Face Inspection Performed  Neck Inspection Performed  Chest / Axilla Inspection Performed  Back Inspection Performed  RUE Inspected  LUE Inspection Performed  RLE Inspected  LLE Inspection Performed              Diagram Legend     Erythematous scaling macule/papule c/w actinic keratosis       Vascular papule c/w angioma      Pigmented verrucoid papule/plaque c/w seborrheic keratosis      Yellow umbilicated papule c/w sebaceous hyperplasia      Irregularly shaped tan macule c/w lentigo     1-2 mm smooth white papules consistent with Milia      Movable subcutaneous cyst with punctum c/w epidermal inclusion cyst      Subcutaneous movable cyst c/w pilar cyst      Firm pink to brown papule c/w dermatofibroma      Pedunculated fleshy papule(s) c/w skin tag(s)      Evenly pigmented macule c/w junctional nevus     Mildly variegated pigmented, slightly irregular-bordered macule c/w mildly atypical nevus      Flesh colored to evenly pigmented papule c/w intradermal nevus       Pink pearly papule/plaque c/w basal cell carcinoma      Erythematous hyperkeratotic cursted plaque c/w SCC      Surgical scar with no sign of skin cancer recurrence      Open and closed comedones      Inflammatory papules and pustules      Verrucoid papule consistent consistent with wart     Erythematous eczematous patches and plaques     Dystrophic onycholytic nail with subungual debris c/w onychomycosis     Umbilicated papule    Erythematous-base heme-crusted tan verrucoid plaque consistent with inflamed seborrheic keratosis     Erythematous Silvery Scaling Plaque c/w Psoriasis     See annotation      Assessment / Plan:        Allergic contact dermatitis, unspecified trigger - vs other eczematous eruption; cannot rule out drug eruption  -     Patch Testing; Future    Second episode in 3 months.  Completely resolves on prednisone.  First episode attributed  to augmentin.  Second episode of unknown trigger.  Has been on amlodipine for 4 years.  No new meds.  Will proceed with patch testing.    Sutures removed today.                 Return if symptoms worsen or fail to improve, for patch testing.

## 2017-03-07 ENCOUNTER — HOSPITAL ENCOUNTER (EMERGENCY)
Facility: OTHER | Age: 52
Discharge: HOME | End: 2017-03-07
Attending: EMERGENCY MEDICINE
Payer: COMMERCIAL

## 2017-03-07 ENCOUNTER — TELEPHONE (OUTPATIENT)
Dept: DERMATOLOGY | Facility: CLINIC | Age: 52
End: 2017-03-07

## 2017-03-07 VITALS
DIASTOLIC BLOOD PRESSURE: 90 MMHG | HEIGHT: 64 IN | HEART RATE: 111 BPM | RESPIRATION RATE: 18 BRPM | WEIGHT: 150 LBS | SYSTOLIC BLOOD PRESSURE: 145 MMHG | OXYGEN SATURATION: 99 % | BODY MASS INDEX: 25.61 KG/M2 | TEMPERATURE: 98 F

## 2017-03-07 DIAGNOSIS — T78.40XA ALLERGIC REACTION, INITIAL ENCOUNTER: Primary | ICD-10-CM

## 2017-03-07 PROCEDURE — 25000003 PHARM REV CODE 250: Performed by: EMERGENCY MEDICINE

## 2017-03-07 PROCEDURE — 63600175 PHARM REV CODE 636 W HCPCS: Performed by: EMERGENCY MEDICINE

## 2017-03-07 PROCEDURE — 99283 EMERGENCY DEPT VISIT LOW MDM: CPT

## 2017-03-07 RX ORDER — FAMOTIDINE 20 MG/1
20 TABLET, FILM COATED ORAL 2 TIMES DAILY
Qty: 20 TABLET | Refills: 0 | Status: SHIPPED | OUTPATIENT
Start: 2017-03-07 | End: 2017-09-20

## 2017-03-07 RX ORDER — PREDNISONE 50 MG/1
50 TABLET ORAL DAILY
Qty: 5 TABLET | Refills: 0 | Status: SHIPPED | OUTPATIENT
Start: 2017-03-07 | End: 2017-03-12

## 2017-03-07 RX ORDER — PREDNISONE 20 MG/1
60 TABLET ORAL
Status: COMPLETED | OUTPATIENT
Start: 2017-03-07 | End: 2017-03-07

## 2017-03-07 RX ORDER — HYDROXYZINE PAMOATE 25 MG/1
25 CAPSULE ORAL
Status: COMPLETED | OUTPATIENT
Start: 2017-03-07 | End: 2017-03-07

## 2017-03-07 RX ORDER — HYDROXYZINE PAMOATE 25 MG/1
25 CAPSULE ORAL
Status: DISCONTINUED | OUTPATIENT
Start: 2017-03-07 | End: 2017-03-07

## 2017-03-07 RX ORDER — HYDROXYZINE HYDROCHLORIDE 25 MG/1
25 TABLET, FILM COATED ORAL 3 TIMES DAILY PRN
Qty: 12 TABLET | Refills: 0 | Status: SHIPPED | OUTPATIENT
Start: 2017-03-07 | End: 2017-09-20

## 2017-03-07 RX ORDER — FAMOTIDINE 20 MG/1
20 TABLET, FILM COATED ORAL
Status: COMPLETED | OUTPATIENT
Start: 2017-03-07 | End: 2017-03-07

## 2017-03-07 RX ADMIN — HYDROXYZINE PAMOATE 25 MG: 25 CAPSULE ORAL at 02:03

## 2017-03-07 RX ADMIN — PREDNISONE 60 MG: 20 TABLET ORAL at 02:03

## 2017-03-07 RX ADMIN — FAMOTIDINE 20 MG: 20 TABLET, FILM COATED ORAL at 02:03

## 2017-03-07 NOTE — TELEPHONE ENCOUNTER
Spoke with pt. And informed her that the topical steriods that was prescribed should help as well as the prednisone prescibed by ER. If rash still present in the next 1-2 days we will get her in for an urgent appt.

## 2017-03-07 NOTE — TELEPHONE ENCOUNTER
----- Message from Alyson Bernardo MD sent at 3/7/2017 11:18 AM CST -----  Contact: Pt  The ER note describes the rash as urticaria/hives to neck and body with facial swelling.  This is likely not the same as the rash that was biopsied, which was more eczematous/allergic contact appearing.  It is ok for her to use the topical steroids I prescribed, but I suspect that she will be helped more by antihistamines (zyrtec or claritin in AM and benadryl at bedtime).  If they gave her systemic steroids (oral or IV) in the ER, this will likely help, as well.  If rash persists over next 1-2 days, have her call us back and we can try to get her back in for an urgent appt.    -bv  ----- Message -----     From: Hesham Small LPN     Sent: 3/7/2017  11:06 AM       To: Alyson Bernardo MD    Spoke with pt. Again and stated that she does have a rash but the rash is burning oppose to the itching from last time. She also would like to know if the medications prescribed will help the burning?  ----- Message -----     From: Alyson Bernardo MD     Sent: 3/7/2017  10:50 AM       To: Hesham Small LPN    If no rash is present, I cannot really comment on the situation.  This sounds like more of a neuropathy.  I suggest that she see neurology if the primary symptoms are pins and needles.  An allergic contact dermatitis would not cause these symptoms, particularly in the absence of a rash.    -bv  ----- Message -----     From: Hesham Small LPN     Sent: 3/7/2017  10:43 AM       To: Alyson Bernardo MD    Ms. Cota stated that the first time she had the allergic reaction, she just had a rash. She stated that this time her body aches and she has pain(Pins and Needles) in her arms, calfs and feet. She would like to know who or what you recommend. She was treated in the ER yesterday with Hydroyzine, Prednisone, and Pepcid.  ----- Message -----     From: Luna Song     Sent: 3/7/2017   9:25 AM       To: Az  Alyson Staff    Pt would like to be called back regarding her having another allergic reaction.    Pt went to the ER on last night.  Pt is having body aches in arms and legs, burning sensation, and pin like feeling in her feet.    Pt wants to know what she should do.    Please call pt at [phone].        Thanks!

## 2017-03-07 NOTE — ED PROVIDER NOTES
"Encounter Date: 3/7/2017    SCRIBE #1 NOTE: I, Dinroah Sow , am scribing for, and in the presence of, Dr. Dye.       History     Chief Complaint   Patient presents with    Generalized Body Aches     hives, body aches, tingling, and itching all over.     Urticaria     Review of patient's allergies indicates:   Allergen Reactions    Augmentin [amoxicillin-pot clavulanate] Rash     HPI Comments: Time seen by provider: 1:55 AM    This is a 51 y.o. female who presents with complaint of generalized body aches. Symptoms began yesterday. She believes symptoms are caused by an allergic reaction. Myalgias is described as a progressively worsening "burning" sensation. Pt reports urticaria, but denies fever, chills, facial swelling, trouble swallowing, nausea, vomiting, abdominal pain, or SOB. She is not experiencing symptoms in the LUE.  Pt reports taking Benadryl (last took five hours ago) and showering with Dial soap with no relief. Episode is consistent to symptoms she experienced four months ago after taking Augmentin, but the patient denies recent use of antibiotics or new medication, products, or foods.     The history is provided by the patient.     Past Medical History:   Diagnosis Date    Abnormal Pap smear     repeat normal    Abnormal Pap smear of cervix     Abnormal Pap smear of vagina     Allergy     seasonal    AR (allergic rhinitis)     Hypercalcemia 9/10/2015    Hypertension     IGT (impaired glucose tolerance) 9/9/2014     Past Surgical History:   Procedure Laterality Date    ABDOMINAL SURGERY      CHOLECYSTECTOMY      1993    COLONOSCOPY N/A 9/24/2016    Procedure: COLONOSCOPY;  Surgeon: Jonhny Clayton MD;  Location: [address];  Service: Endoscopy;  Laterality: N/A;    COLPOSCOPY      DILATION AND CURETTAGE OF UTERUS      HYSTERECTOMY      2007 tahbso     OOPHORECTOMY       Family History   Problem Relation Age of Onset    Hypertension Mother     Diabetes Mother     Glaucoma " Mother     Hypertension Sister      four sisters    Glaucoma Father     Thyroid cancer Neg Hx     Breast cancer Neg Hx     Cancer Neg Hx     Colon cancer Neg Hx     Ovarian cancer Neg Hx     Melanoma Neg Hx     Psoriasis Neg Hx     Lupus Neg Hx      Social History   Substance Use Topics    Smoking status: Never Smoker    Smokeless tobacco: None    Alcohol use 0.0 oz/week     1 Standard drinks or equivalent per week      Comment: socailly     Review of Systems   Constitutional: Negative for chills and fever.   HENT: Negative for congestion, facial swelling, sore throat and trouble swallowing.    Eyes: Negative for redness and visual disturbance.   Respiratory: Negative for cough and shortness of breath.    Cardiovascular: Negative for chest pain and palpitations.   Gastrointestinal: Negative for abdominal pain, diarrhea, nausea and vomiting.   Genitourinary: Negative for dysuria.   Musculoskeletal: Positive for myalgias. Negative for back pain.   Skin: Positive for rash.   Neurological: Negative for weakness and headaches.   Psychiatric/Behavioral: Negative for confusion.       Physical Exam   Initial Vitals   BP Pulse Resp Temp SpO2   03/07/17 0037 03/07/17 0037 03/07/17 0037 03/07/17 0037 03/07/17 0037   183/94 128 18 98 °F (36.7 °C) 99 %     Physical Exam    Nursing note and vitals reviewed.  Constitutional: She appears well-developed and well-nourished. She is not diaphoretic. No distress.   HENT:   Head: Normocephalic and atraumatic.   Right Ear: External ear normal.   Left Ear: External ear normal.   General facial edema. No lip or intraoral mucosal swelling.    Eyes: Conjunctivae and EOM are normal. Pupils are equal, round, and reactive to light. Right eye exhibits no discharge. Left eye exhibits no discharge. No scleral icterus.   Neck: Normal range of motion. Neck supple.   Cardiovascular: Regular rhythm, normal heart sounds and intact distal pulses. Tachycardia present.  Exam reveals no gallop  and no friction rub.    No murmur heard.  Pulmonary/Chest: Breath sounds normal. No stridor. No respiratory distress. She has no wheezes. She has no rhonchi. She has no rales.   Lungs are clear to auscultation bilaterally.    Abdominal: Soft. She exhibits no distension. There is no tenderness. There is no rebound and no guarding.   Musculoskeletal: Normal range of motion. She exhibits no edema or tenderness.   Neurological: She is alert and oriented to person, place, and time. She has normal strength. No cranial nerve deficit.   Skin: Skin is warm and dry. Rash noted. Rash is urticarial.   Urticarial wheals on the neck, back, and all extremities. No other rash or lesions noted.    Psychiatric: She has a normal mood and affect. Her behavior is normal. Judgment and thought content normal.         ED Course   Procedures  Labs Reviewed - No data to display          Medical Decision Making:   ED Management:  Well-appearing patient presents with an odd constellation of symptoms that she reports is typical of her ALLERGIC reactions for the past several months.  He on part is that there is some pain and burning involving sensations.  She also is focally worse on her right upper arm, no involvement of left upper arm.  There are urticarial type wheals on examination.  No other lesions, certainly nothing vesicular.  Prescribed hydroxyzine, Pepcid, prednisone.  She already has follow-up with dermatology and ALLERGY arranged.  She is frustrated that nothing further to provide to stop the burning sensation I have extensive discussion with her that the best plan is to treat the ALLERGIC type symptoms.    I did have an extensive talk regarding signs to return for and need for follow up. Patient expressed understanding and will monitor symptoms closely and follow-up as needed.    MERLIN Dye M.D.  03/07/2017  6:29 AM              Scribe Attestation:   Scribe #1: I performed the above scribed service and the documentation  accurately describes the services I performed. I attest to the accuracy of the note.    Attending Attestation:           Physician Attestation for Scribe:  Physician Attestation Statement for Scribe #1: I, Dr. Dye, reviewed documentation, as scribed by Dinorah Sow  in my presence, and it is both accurate and complete.                 ED Course     Clinical Impression:     1. Allergic reaction, initial encounter                Jw Dye MD  03/07/17 0626

## 2017-03-07 NOTE — ED AVS SNAPSHOT
[facility]  [address]  [facility] [address]               Pastora Cota   3/7/2017  1:16 AM   ED    Description:  Female : 1965   Department:  [facility]           Your Care was Coordinated By:     Provider Role From To    Jw Dye MD Attending Provider 17 0120 --      Reason for Visit     Generalized Body Aches     Urticaria           Diagnoses this Visit        Comments    Allergic reaction, initial encounter    -  Primary       ED Disposition     None           To Do List           Follow-up Information     Schedule an appointment as soon as possible for a visit with Kiko Koch MD.    Specialty:  Internal Medicine    Contact information:    [address]  [address]  [facility] [id number] [phone]          Schedule an appointment as soon as possible for a visit with YECENIA Benitez Iii, MD.    Specialty:  Allergy    Contact information:    [address] [facility] [id number] [phone]          Schedule an appointment as soon as possible for a visit with Alyson Bernardo MD.    Specialty:  Pathology    Contact information:    [address] [facility] [id number] [phone]         These Medications        Disp Refills Start End    hydrOXYzine HCl (ATARAX) 25 MG tablet 12 tablet 0 3/7/2017     Take 1 tablet (25 mg total) by mouth 3 (three) times daily as needed for Itching. - Oral    Pharmacy: [facility] [address] [place] [address] Ph #: [phone]       predniSONE (DELTASONE) 50 MG Tab 5 tablet 0 3/7/2017 3/12/2017    Take 1 tablet (50 mg total) by mouth once daily. - Oral    Pharmacy: [facility] [address] [place] [address] Ph #: [phone]       famotidine (PEPCID) 20 MG tablet 20 tablet 0 3/7/2017 3/7/2018    Take 1 tablet (20 mg total) by mouth 2 (two) times daily. - Oral    Pharmacy: [address]  [facility] [address] [place] [address] #: [phone]         [facility]     [facility] Line - 24/7 Assistance  Registered nurses in the [facility] provide clinical advisement, health education, appointment booking, and other advisory services.  Call for this free service at [phone].             Medications           Message regarding Medications     Verify the changes and/or additions to your medication regime listed below are the same as discussed with your clinician today.  If any of these changes or additions are incorrect, please notify your healthcare provider.        START taking these NEW medications        Refills    hydrOXYzine HCl (ATARAX) 25 MG tablet 0    Sig: Take 1 tablet (25 mg total) by mouth 3 (three) times daily as needed for Itching.    Class: Print    Route: Oral    predniSONE (DELTASONE) 50 MG Tab 0    Sig: Take 1 tablet (50 mg total) by mouth once daily.    Class: Print    Route: Oral    famotidine (PEPCID) 20 MG tablet 0    Sig: Take 1 tablet (20 mg total) by mouth 2 (two) times daily.    Class: Print    Route: Oral      These medications were administered today        Dose Freq    predniSONE tablet 60 mg 60 mg ED 1 Time    Sig: Take 3 tablets (60 mg total) by mouth ED 1 Time.    Class: Normal    Route: Oral    famotidine tablet 20 mg 20 mg ED 1 Time    Sig: Take 1 tablet (20 mg total) by mouth ED 1 Time.    Class: Normal    Route: Oral    hydrOXYzine pamoate capsule 25 mg 25 mg ED 1 Time    Sig: Take 1 capsule (25 mg total) by mouth ED 1 Time.    Class: Normal    Route: Oral           Verify that the below list of medications is an accurate representation of the medications you are currently taking.  If none reported, the list may be blank. If incorrect, please contact your healthcare provider. Carry this list with you in case of emergency.           Current Medications     amlodipine (NORVASC) 10 MG tablet Take 1 tablet (10 mg total) by mouth  "once daily.    epinephrine (EPIPEN) 0.3 mg/0.3 mL AtIn Inject 0.3 mLs (0.3 mg total) into the muscle as needed (extreme swelling, difficulty swallowing, voice changes or trouble breathing).    famotidine (PEPCID) 20 MG tablet Take 1 tablet (20 mg total) by mouth 2 (two) times daily.    famotidine tablet 20 mg Take 1 tablet (20 mg total) by mouth ED 1 Time.    hydrocortisone butyrate (LOCOID) 0.1 % Crea cream AAA face bid    hydrOXYzine HCl (ATARAX) 25 MG tablet Take 1 tablet (25 mg total) by mouth 3 (three) times daily as needed for Itching.    hydrOXYzine pamoate capsule 25 mg Take 1 capsule (25 mg total) by mouth ED 1 Time.    predniSONE (DELTASONE) 20 MG tablet Take 3 pills po qam with breakfast x 5 days, take 2 po qam with breakfast x 5 days, then take 1 po qam with breakfast x 5 days    predniSONE (DELTASONE) 50 MG Tab Take 1 tablet (50 mg total) by mouth once daily.    predniSONE tablet 60 mg Take 3 tablets (60 mg total) by mouth ED 1 Time.    triamcinolone acetonide 0.1% (KENALOG) 0.1 % cream AAA bid           Clinical Reference Information           Your Vitals Were     BP Pulse Temp Resp Height Weight    183/94 (BP Location: Right arm, Patient Position: Sitting) 128 98 °F (36.7 °C) (Oral) 18 5' 4" (1.626 m) 68 kg (150 lb)    SpO2 BMI             99% 25.75 kg/m2         Allergies as of 3/7/2017        Reactions    Augmentin [Amoxicillin-pot Clavulanate] Rash      Immunizations Administered on Date of Encounter - 3/7/2017     None      ED Micro, Lab, POCT     None      ED Imaging Orders     None      Discharge References/Attachments     ALLERGIC REACTION, OTHER (GENERAL) (ENGLISH)    HYDROXYZINE CAPSULES OR TABLETS (ENGLISH)    FAMOTIDINE TABLETS OR GELCAPS (ENGLISH)    PREDNISONE TABLETS (ENGLISH)      Your Scheduled Appointments     Mar 20, 2017  3:00 PM CDT   [facility]   [facility] - Dermatology ([address] )    [address]  [facility] [address] [phone]         "    Mar 22, 2017  1:00 PM CDT   Patch Testing with MD Enrrique Gonsalez awilda - Dermatology ([facility] )    [address]  [place] [address] [phone]            Mar 24, 2017  1:00 PM CDT   Patch Testing with MD Enrrique Gonsalez - Dermatology ([facility] )    [address]  [place] [address] [phone]               [facility] complies with applicable Federal civil rights laws and does not discriminate on the basis of race, color, national origin, age, disability, or sex.        Language Assistance Services     ATTENTION: Language assistance services are available, free of charge. Please call [phone].      ATENCIÓN: Si habla mervatañol, tiene a tomlinson disposición servicios gratuitos de asistencia lingüística. Llame al [phone].     CHÚ Ý: N?u b?n nói Ti?ng Vi?t, có các d?ch v? h? tr? ngôn ng? mi?n phí dành cho b?n. G?i s? [phone].

## 2017-03-17 ENCOUNTER — PATIENT MESSAGE (OUTPATIENT)
Dept: DERMATOLOGY | Facility: CLINIC | Age: 52
End: 2017-03-17

## 2017-03-20 ENCOUNTER — TELEPHONE (OUTPATIENT)
Dept: DERMATOLOGY | Facility: CLINIC | Age: 52
End: 2017-03-20

## 2017-03-22 ENCOUNTER — PATIENT MESSAGE (OUTPATIENT)
Dept: ALLERGY | Facility: CLINIC | Age: 52
End: 2017-03-22

## 2017-04-04 ENCOUNTER — TELEPHONE (OUTPATIENT)
Dept: DERMATOLOGY | Facility: CLINIC | Age: 52
End: 2017-04-04

## 2017-04-17 ENCOUNTER — CLINICAL SUPPORT (OUTPATIENT)
Dept: DERMATOLOGY | Facility: CLINIC | Age: 52
End: 2017-04-17
Payer: COMMERCIAL

## 2017-04-17 DIAGNOSIS — L23.9 ALLERGIC CONTACT DERMATITIS, UNSPECIFIED TRIGGER: ICD-10-CM

## 2017-04-17 PROCEDURE — 99999 PR PBB SHADOW E&M-EST. PATIENT-LVL II: CPT | Mod: PBBFAC,,,

## 2017-04-17 PROCEDURE — 95044 PATCH/APPLICATION TESTS: CPT | Mod: S$GLB,,, | Performed by: PATHOLOGY

## 2017-04-17 RX ORDER — CETIRIZINE HYDROCHLORIDE 10 MG/1
10 TABLET ORAL DAILY PRN
Status: ON HOLD | COMMUNITY
End: 2019-04-10 | Stop reason: SDUPTHER

## 2017-04-17 NOTE — PROGRESS NOTES
The [employer] Standard Series patch Test was applied on 4-17-17 on pt's posterior thorax. Seventy allergens were applied for the diagnosis of allergic contact dermatitis, unspecified trigger. (ICD10: L23.9)  The pt is to follow up with the dr on Wednesday 4-19-17 for the first reading and Friday 4-21-17 for the second reading.

## 2017-04-19 ENCOUNTER — CLINICAL SUPPORT (OUTPATIENT)
Dept: DERMATOLOGY | Facility: CLINIC | Age: 52
End: 2017-04-19
Payer: COMMERCIAL

## 2017-04-19 DIAGNOSIS — L23.9 ALLERGIC CONTACT DERMATITIS, UNSPECIFIED TRIGGER: Primary | ICD-10-CM

## 2017-04-19 PROCEDURE — 99212 OFFICE O/P EST SF 10 MIN: CPT | Mod: S$GLB,,, | Performed by: PATHOLOGY

## 2017-04-19 NOTE — PROGRESS NOTES
Subjective:       Patient ID:  Pastora Cota is a 51 y.o. female who presents for No chief complaint on file.    HPI  Pt here for 1st patch test reading.  No additional episodes of rash or swelling since last visit.    Review of Systems   Constitutional: Negative for fever and chills.        Objective:    Physical Exam   Constitutional: She appears well-developed and well-nourished.   Neurological: She is alert.   Skin:   Areas Examined (abnormalities noted in diagram):   Back Inspection Performed              Diagram Legend     Erythematous scaling macule/papule c/w actinic keratosis       Vascular papule c/w angioma      Pigmented verrucoid papule/plaque c/w seborrheic keratosis      Yellow umbilicated papule c/w sebaceous hyperplasia      Irregularly shaped tan macule c/w lentigo     1-2 mm smooth white papules consistent with Milia      Movable subcutaneous cyst with punctum c/w epidermal inclusion cyst      Subcutaneous movable cyst c/w pilar cyst      Firm pink to brown papule c/w dermatofibroma      Pedunculated fleshy papule(s) c/w skin tag(s)      Evenly pigmented macule c/w junctional nevus     Mildly variegated pigmented, slightly irregular-bordered macule c/w mildly atypical nevus      Flesh colored to evenly pigmented papule c/w intradermal nevus       Pink pearly papule/plaque c/w basal cell carcinoma      Erythematous hyperkeratotic cursted plaque c/w SCC      Surgical scar with no sign of skin cancer recurrence      Open and closed comedones      Inflammatory papules and pustules      Verrucoid papule consistent consistent with wart     Erythematous eczematous patches and plaques     Dystrophic onycholytic nail with subungual debris c/w onychomycosis     Umbilicated papule    Erythematous-base heme-crusted tan verrucoid plaque consistent with inflamed seborrheic keratosis     Erythematous Silvery Scaling Plaque c/w Psoriasis     See annotation      Assessment / Plan:        Allergic  contact dermatitis, unspecified trigger    1st patch test reading today: 1+ balsam of [place]; equivocal (faint erythema only) for iodopropynyl butylcarbamate         Return in about 2 days (around 4/21/2017) for second patch test reading.

## 2017-04-21 ENCOUNTER — CLINICAL SUPPORT (OUTPATIENT)
Dept: DERMATOLOGY | Facility: CLINIC | Age: 52
End: 2017-04-21
Payer: COMMERCIAL

## 2017-04-21 DIAGNOSIS — L23.9 ALLERGIC CONTACT DERMATITIS, UNSPECIFIED TRIGGER: Primary | ICD-10-CM

## 2017-04-21 PROCEDURE — 99212 OFFICE O/P EST SF 10 MIN: CPT | Mod: S$GLB,,, | Performed by: PATHOLOGY

## 2017-04-21 PROCEDURE — 99999 PR PBB SHADOW E&M-EST. PATIENT-LVL II: CPT | Mod: PBBFAC,,, | Performed by: PATHOLOGY

## 2017-04-21 NOTE — PROGRESS NOTES
Subjective:       Patient ID:  Pastora Cota is a 51 y.o. female who presents for   Chief Complaint   Patient presents with    Patch Testing     2nd Patch Reading     Patch Testing       Pt here today for second patch test reading.    Review of Systems   Constitutional: Negative for fever, chills, weight loss, weight gain, fatigue, night sweats and malaise.   Skin: Positive for itching. Negative for daily sunscreen use and recent sunburn.   Hematologic/Lymphatic: Does not bruise/bleed easily.        Objective:    Physical Exam   Constitutional: She appears well-developed and well-nourished.   Neurological: She is alert.   Skin:   Areas Examined (abnormalities noted in diagram):   Back Inspection Performed              Diagram Legend     Erythematous scaling macule/papule c/w actinic keratosis       Vascular papule c/w angioma      Pigmented verrucoid papule/plaque c/w seborrheic keratosis      Yellow umbilicated papule c/w sebaceous hyperplasia      Irregularly shaped tan macule c/w lentigo     1-2 mm smooth white papules consistent with Milia      Movable subcutaneous cyst with punctum c/w epidermal inclusion cyst      Subcutaneous movable cyst c/w pilar cyst      Firm pink to brown papule c/w dermatofibroma      Pedunculated fleshy papule(s) c/w skin tag(s)      Evenly pigmented macule c/w junctional nevus     Mildly variegated pigmented, slightly irregular-bordered macule c/w mildly atypical nevus      Flesh colored to evenly pigmented papule c/w intradermal nevus       Pink pearly papule/plaque c/w basal cell carcinoma      Erythematous hyperkeratotic cursted plaque c/w SCC      Surgical scar with no sign of skin cancer recurrence      Open and closed comedones      Inflammatory papules and pustules      Verrucoid papule consistent consistent with wart     Erythematous eczematous patches and plaques     Dystrophic onycholytic nail with subungual debris c/w onychomycosis     Umbilicated papule     "Erythematous-base heme-crusted tan verrucoid plaque consistent with inflamed seborrheic keratosis     Erythematous Silvery Scaling Plaque c/w Psoriasis     See annotation      Assessment / Plan:        Allergic contact dermatitis, unspecified trigger - 2+ reaction to Balsam of [place]  - provided contact allergen data sheet with common products this is found in, as well as other compounds known to cross react with Balsam of [place]  - emailed patient comprehensive "safe product list" from CARD database  - stressed avoidance           Return if symptoms worsen or fail to improve.  "

## 2017-08-01 ENCOUNTER — PATIENT MESSAGE (OUTPATIENT)
Dept: OBSTETRICS AND GYNECOLOGY | Facility: CLINIC | Age: 52
End: 2017-08-01

## 2017-08-01 ENCOUNTER — PATIENT MESSAGE (OUTPATIENT)
Dept: GASTROENTEROLOGY | Facility: CLINIC | Age: 52
End: 2017-08-01

## 2017-08-01 RX ORDER — BALSALAZIDE DISODIUM 750 MG/1
2250 CAPSULE ORAL 3 TIMES DAILY
Qty: 270 CAPSULE | Refills: 3 | Status: ON HOLD | OUTPATIENT
Start: 2017-08-01 | End: 2017-12-27 | Stop reason: HOSPADM

## 2017-09-15 ENCOUNTER — TELEPHONE (OUTPATIENT)
Dept: ENDOCRINOLOGY | Facility: CLINIC | Age: 52
End: 2017-09-15

## 2017-09-15 NOTE — TELEPHONE ENCOUNTER
----- Message from Sarai Tamera sent at 9/15/2017  8:20 AM CDT -----  Contact: [phone] julia Guillory Pt.says she wants a f/u w/ Dr. Kaba.   Pls call w/ a date .

## 2017-09-15 NOTE — TELEPHONE ENCOUNTER
----- Message from Sarai Tamera sent at 9/15/2017  8:20 AM CDT -----  Contact: [phone] julia Guillory Pt.says she wants a f/u w/ Dr. Kaba.   Pls call w/ a date .

## 2017-09-18 ENCOUNTER — PATIENT MESSAGE (OUTPATIENT)
Dept: ENDOCRINOLOGY | Facility: CLINIC | Age: 52
End: 2017-09-18

## 2017-09-20 ENCOUNTER — OFFICE VISIT (OUTPATIENT)
Dept: OBSTETRICS AND GYNECOLOGY | Facility: CLINIC | Age: 52
End: 2017-09-20
Attending: OBSTETRICS & GYNECOLOGY
Payer: COMMERCIAL

## 2017-09-20 VITALS
WEIGHT: 168.19 LBS | HEIGHT: 64 IN | BODY MASS INDEX: 28.71 KG/M2 | SYSTOLIC BLOOD PRESSURE: 130 MMHG | DIASTOLIC BLOOD PRESSURE: 78 MMHG

## 2017-09-20 DIAGNOSIS — Z80.3 FAMILY HISTORY OF BREAST CANCER IN FIRST DEGREE RELATIVE: ICD-10-CM

## 2017-09-20 DIAGNOSIS — Z12.31 ENCOUNTER FOR SCREENING MAMMOGRAM FOR BREAST CANCER: ICD-10-CM

## 2017-09-20 DIAGNOSIS — Z01.419 WOMEN'S ANNUAL ROUTINE GYNECOLOGICAL EXAMINATION: Primary | ICD-10-CM

## 2017-09-20 DIAGNOSIS — R23.4 BREAST SKIN CHANGES: ICD-10-CM

## 2017-09-20 PROCEDURE — 99999 PR PBB SHADOW E&M-EST. PATIENT-LVL III: CPT | Mod: PBBFAC,,, | Performed by: NURSE PRACTITIONER

## 2017-09-20 PROCEDURE — 99396 PREV VISIT EST AGE 40-64: CPT | Mod: S$GLB,,, | Performed by: NURSE PRACTITIONER

## 2017-09-20 NOTE — PROGRESS NOTES
"Pastora Cota is a 51 y.o. year old  who presents for annual exam.  She is S/P hysterectomy d/t fibroids.   Reports her sister was recently diagnosed with breast cancer at age 44. Reports she noticed some skin changes on her right breast- states it appeared "flaky."  Also reports intermittent right breast pain.       ROS:  GENERAL: Feeling well overall.   SKIN: Denies rash or lesions.   HEAD: Denies head injury or headache.   NODES: Denies enlarged lymph nodes.   CHEST: Denies chest pain or shortness of breath.   CARDIOVASCULAR: Denies palpitations or left sided chest pain.   ABDOMEN: No abdominal pain, nausea, vomiting or rectal bleeding.   URINARY: No dysuria or hematuria.  REPRODUCTIVE: See HPI.   BREASTS: Denies pain, lumps, or nipple discharge.   HEMATOLOGIC: No easy bruisability or excessive bleeding.   MUSCULOSKELETAL: Denies joint pain or swelling.   NEUROLOGIC: Denies syncope or weakness.   PSYCHIATRIC: Denies depression.    PE:    APPEARANCE: Well nourished, well developed, in no acute distress.  NODES: No inguinal lymph node enlargement.  ABDOMEN: Soft. No tenderness or masses. No hernias.  BREASTS: Symmetrical, no skin changes or visible lesions. No palpable masses, nipple discharge or adenopathy bilaterally.  PELVIC: Normal external female genitalia without lesions. Normal hair distribution. Adequate perineal body, normal urethral meatus. Vagina without lesions or discharge. No significant cystocele or rectocele. Uterus and cervix surgically absent. Bimanual exam revealed no masses, tenderness or abnormality.  ANUS: Normal.    Diagnosis:  1. Women's annual routine gynecological examination    2. Encounter for screening mammogram for breast cancer    3. Breast skin changes    4. Family history of breast cancer in first degree relative          PLAN:  Mammogram with FELICITA  Right Breast US  Orders Placed This Encounter    Mammo Digital Screening Bilat with Tomosynthesis CAD    US Breast " Right Complete       Patient was counseled today on postmenopausal issues.     Follow-up in 1 year.    ES Schwartz

## 2017-09-21 ENCOUNTER — HOSPITAL ENCOUNTER (OUTPATIENT)
Dept: RADIOLOGY | Facility: OTHER | Age: 52
Discharge: HOME | End: 2017-09-21
Attending: NURSE PRACTITIONER
Payer: COMMERCIAL

## 2017-09-21 DIAGNOSIS — Z12.31 ENCOUNTER FOR SCREENING MAMMOGRAM FOR BREAST CANCER: ICD-10-CM

## 2017-09-21 PROCEDURE — 77067 SCR MAMMO BI INCL CAD: CPT | Mod: 26,,, | Performed by: RADIOLOGY

## 2017-09-21 PROCEDURE — 77063 BREAST TOMOSYNTHESIS BI: CPT | Mod: 26,,, | Performed by: RADIOLOGY

## 2017-09-21 PROCEDURE — 77067 SCR MAMMO BI INCL CAD: CPT | Mod: TC

## 2017-10-02 ENCOUNTER — TELEPHONE (OUTPATIENT)
Dept: OBSTETRICS AND GYNECOLOGY | Facility: CLINIC | Age: 52
End: 2017-10-02

## 2017-10-02 ENCOUNTER — HOSPITAL ENCOUNTER (OUTPATIENT)
Dept: RADIOLOGY | Facility: OTHER | Age: 52
Discharge: HOME | End: 2017-10-02
Attending: NURSE PRACTITIONER
Payer: COMMERCIAL

## 2017-10-02 DIAGNOSIS — R23.4 BREAST SKIN CHANGES: ICD-10-CM

## 2017-10-02 PROCEDURE — 76642 ULTRASOUND BREAST LIMITED: CPT | Mod: TC,RT

## 2017-10-02 PROCEDURE — 76642 ULTRASOUND BREAST LIMITED: CPT | Mod: 26,RT,, | Performed by: RADIOLOGY

## 2017-10-09 ENCOUNTER — TELEPHONE (OUTPATIENT)
Dept: OBSTETRICS AND GYNECOLOGY | Facility: CLINIC | Age: 52
End: 2017-10-09

## 2017-10-09 ENCOUNTER — HOSPITAL ENCOUNTER (OUTPATIENT)
Dept: RADIOLOGY | Facility: OTHER | Age: 52
Discharge: HOME | End: 2017-10-09
Attending: NURSE PRACTITIONER
Payer: COMMERCIAL

## 2017-10-09 DIAGNOSIS — R92.8 ABNORMAL MAMMOGRAM: ICD-10-CM

## 2017-10-09 DIAGNOSIS — R92.8 ABNORMAL MAMMOGRAM: Primary | ICD-10-CM

## 2017-10-09 PROCEDURE — 76642 ULTRASOUND BREAST LIMITED: CPT | Mod: 26,RT,, | Performed by: RADIOLOGY

## 2017-10-09 PROCEDURE — 76642 ULTRASOUND BREAST LIMITED: CPT | Mod: TC,RT

## 2017-10-09 NOTE — TELEPHONE ENCOUNTER
Patient notified of US results and recommended follow up in 6 months. Patient verbalized understanding.       Please notify pt her Breast US was benign- recommend repeat mammogram and US in 6 months- order is in

## 2017-10-25 ENCOUNTER — TELEPHONE (OUTPATIENT)
Dept: RADIOLOGY | Facility: OTHER | Age: 52
End: 2017-10-25

## 2017-11-14 ENCOUNTER — PATIENT MESSAGE (OUTPATIENT)
Dept: ALLERGY | Facility: CLINIC | Age: 52
End: 2017-11-14

## 2017-11-16 ENCOUNTER — TELEPHONE (OUTPATIENT)
Dept: GASTROENTEROLOGY | Facility: CLINIC | Age: 52
End: 2017-11-16

## 2017-11-16 DIAGNOSIS — K63.3 COLONIC ULCER: Primary | ICD-10-CM

## 2017-11-16 NOTE — TELEPHONE ENCOUNTER
----- Message from Yanna Rey sent at 11/14/2017 11:59 AM CST -----  Contact: PT  PT is requesting a nurse to call her back regarding future appointments.     Callback: [phone]

## 2017-11-16 NOTE — TELEPHONE ENCOUNTER
----- Message from Erika Ro MA sent at 11/16/2017  1:30 PM CST -----  Patient is asking for orders for a colonoscopy to be put in because she would like to schedule a procedure. Please advise

## 2017-11-17 ENCOUNTER — TELEPHONE (OUTPATIENT)
Dept: ALLERGY | Facility: CLINIC | Age: 52
End: 2017-11-17

## 2017-11-22 ENCOUNTER — PATIENT MESSAGE (OUTPATIENT)
Dept: GASTROENTEROLOGY | Facility: CLINIC | Age: 52
End: 2017-11-22

## 2017-11-27 ENCOUNTER — TELEPHONE (OUTPATIENT)
Dept: ENDOSCOPY | Facility: HOSPITAL | Age: 52
End: 2017-11-27

## 2017-11-27 NOTE — TELEPHONE ENCOUNTER
Patient contacted to schedule colonoscopy.  Scheduled for 12/27/17 at 1:00 pm with Dr Clayton.  Went over prep instructions.  Stated understanding.  Instructions mailed to address in chart.

## 2017-11-29 ENCOUNTER — OFFICE VISIT (OUTPATIENT)
Dept: OPTOMETRY | Facility: CLINIC | Age: 52
End: 2017-11-29
Payer: COMMERCIAL

## 2017-11-29 DIAGNOSIS — Z01.00 EXAMINATION OF EYES AND VISION: Primary | ICD-10-CM

## 2017-11-29 DIAGNOSIS — H11.153 PINGUECULA, BILATERAL: ICD-10-CM

## 2017-11-29 DIAGNOSIS — H52.7 REFRACTIVE ERROR: ICD-10-CM

## 2017-11-29 PROCEDURE — 92015 DETERMINE REFRACTIVE STATE: CPT | Mod: S$GLB,,, | Performed by: OPTOMETRIST

## 2017-11-29 PROCEDURE — 99999 PR PBB SHADOW E&M-EST. PATIENT-LVL II: CPT | Mod: PBBFAC,,, | Performed by: OPTOMETRIST

## 2017-11-29 PROCEDURE — 92014 COMPRE OPH EXAM EST PT 1/>: CPT | Mod: S$GLB,,, | Performed by: OPTOMETRIST

## 2017-11-29 NOTE — PATIENT INSTRUCTIONS
(Pinguecula)  Understanding a Pterygium    A pterygium (tje-UOA-is-um) is a type of growth on the eye that is not cancer. It is most often only a minor problem unless it causes changes in your eyesight. Pterygia (the plural form of the word) are fairly common in norma climates and in people who do outdoor work.    What is the conjunctiva?  The conjunctiva is the thin layer that lines the inside of your eyelids and the surface of your eye. In some cases part of this conjunctiva starts to grow abnormally. This growth most often starts on the white part of the eye that is closer to the nose. It can also begin on the other side of the eye. From there, the tissue can spread and cover the cornea. The cornea is the clear cap on the front part of your eye in front of the opening of your eye (pupil) and colored iris.    How a pterygium grows  A pterygium is not a type of cancer. It will not spread to other parts of your body. If you have a pterygium, it may stop growing at some point. Or it may continue to grow throughout your lifetime. It may grow over a period of months to years and then stop for a while. If it grows and covers your cornea, it is more likely to cause eyesight problems.    What causes a pterygium?  Scientists arent sure what causes pterygia to occur. Eye exposure to ultraviolet light may be a cause. Having certain genes may lead to pterygia in some people. Spending a lot of time in the sun or not wearing sunglasses may increase your risks for a pterygium. If someone in your family has a pterygium, you may be more likely to have one.    Symptoms of a pterygium  In most cases, a pterygium is a wedge-shaped growth. Some people may notice their pterygium only after the growth has covered a large part of their cornea and blocked some eyesight. The pterygium might be white, pink, or red. Symptoms are often mild. Many people dont have any symptoms, especially if the pterygium is still small. Some symptoms can  include:  Eye irritation  Eye dryness  Eye redness  Visual blurriness, if the pterygium gets close to the middle of your cornea  Restriction of eye movement (rare)    Diagnosing a pterygium  Your healthcare provider will ask about your health history and give you a physical exam. This may include a detailed eye exam, especially if you have eyesight symptoms. Your healthcare provider may refer you to an eye doctor.  Your eye doctor will look closely at the growth to make sure it is not another eye condition. In some cases the doctor may take a small sample (biopsy) of the pterygium. The sample is checked for cancer. Most people will not need a biopsy.    © 8524-0135 [employer]. [address] [place] [address], [place] [address]. All rights reserved. This information is not intended as a substitute for professional medical care. Always follow your healthcare professional's instructions.      Pterygium      A pterygium is a noncancerous growth that starts in the clear, thin tissue over the eye (conjunctiva). This fleshy growth covers the white part of the eye (sclera) and grows onto the clear part of the eye (cornea). One or both eyes may be affected. A pterygium may have no symptoms, or it may cause eye irritation. This may include itching and redness. If the growth is large, it can cause vision problems. Most of the time a pterygium is harmless, and no treatment is needed. If vision is affected, the growth should be removed.    It is not known exactly what causes a pterygium. Exposure of the eyes to the sun (UV-light exposure), dust, wind, and low humidity are thought to be factors. People who spend a lot of time in the sun, or who live in places where the suns rays are intense, may have an increased risk of developing pterygia. To protect your eyes, wear sunglasses with side shields that block 100% of UV rays when outside. This may help prevent the condition from coming back or getting worse. It is also  helpful to wear a wide-brimmed hat when outside.    Home care  If you have a pterygium, over-the-counter eye drops may be recommended. These help soothe inflammation and dryness.    Follow-up care  Follow up with your healthcare provider, or as advised. If you notice changes in your vision, contact an eye doctor right away.    When to seek medical advice  Call your healthcare provider right away if any of these occur:  · Changes in your vision  · Bleeding from your eyes  · Eye pain  · New eye problems    © 6538-5234 [employer]. [address] [place] [address]. All rights reserved. This information is not intended as a substitute for professional medical care. Always follow your healthcare professional's instructions.

## 2017-11-29 NOTE — PROGRESS NOTES
Subjective:       Patient ID: Pastora Cota is a 52 y.o. female      Chief Complaint   Patient presents with    Concerns About Ocular Health     History of Present Illness  Dls: 7/22/16 Dr. Umanzor    Pt states x 1 week notice bump nasal os red os no pain no burning no   itching no tearing no ha's no floaters. Pt c/o problems seeing the   computer screen off/on. Pt wears otc readers..     Eye meds:  None       Assessment/Plan:     1. Examination of eyes and vision  Good ocular health OU. Educated pt on presence of cataracts and effects on vision. No surgery at this time. Recheck in one year.    2. Pinguecula, bilateral  Discussed with pt. Recommend UV protection and AT PRN.    3. Refractive error  Educated patient on refractive error and discussed lens options. Dispensed updated spectacle Rx. Educated about adaptation period to new specs.    Eyeglass Final Rx     Eyeglass Final Rx       Sphere Cylinder Axis Add    Right Tad +0.25 005 +2.00    Left Tad +0.25 020 +2.00    Type:  PAL    Expiration Date:  11/30/2018                Return in about 1 year (around 11/29/2018) for Annual eye exam.

## 2017-12-01 DIAGNOSIS — L27.0 GENERALIZED SKIN ERUPTION DUE TO MEDICATION TAKEN INTERNALLY: ICD-10-CM

## 2017-12-04 RX ORDER — HYDROCORTISONE BUTYRATE 1 MG/G
CREAM TOPICAL
Qty: 45 G | Refills: 3 | Status: ON HOLD | OUTPATIENT
Start: 2017-12-04 | End: 2019-04-10 | Stop reason: HOSPADM

## 2017-12-12 ENCOUNTER — PATIENT MESSAGE (OUTPATIENT)
Dept: ALLERGY | Facility: CLINIC | Age: 52
End: 2017-12-12

## 2017-12-15 ENCOUNTER — TELEPHONE (OUTPATIENT)
Dept: INTERNAL MEDICINE | Facility: CLINIC | Age: 52
End: 2017-12-15

## 2017-12-15 NOTE — TELEPHONE ENCOUNTER
----- Message from Bettye Juarez sent at 12/15/2017  8:24 AM CST -----  Contact: self [phone]  Patient requesting a call from the office in regards to her appointment on Monday . Please call and advise ,Thanks

## 2017-12-15 NOTE — TELEPHONE ENCOUNTER
Spoke to patient. Patient feels that her allergic reaction is more to Sulfa medication and not PCN. Per patient, she has had 3 separate reactions while on Bactrim.  Informed patient to come in Monday to discuss further with Dr. Benitez.  Typically for Sulfa allergy patient will be referred to [facility] to further evaluate. Patient verbalized understanding. Patient then asked about shellfish allergy. Explained that our office can test for shellfish allergy either by skin test or bloodwork depending on symptoms. Patient states that she currently eats shellfish with no problem. Informed patient that test are usually not done if there are no symptoms due to insurance not covering it but advised patient to discuss further with . Patient verbalized understanding and will follow up on Monday.    FYKAILEE

## 2017-12-18 ENCOUNTER — OFFICE VISIT (OUTPATIENT)
Dept: ALLERGY | Facility: CLINIC | Age: 52
End: 2017-12-18
Payer: COMMERCIAL

## 2017-12-18 ENCOUNTER — PATIENT MESSAGE (OUTPATIENT)
Dept: ALLERGY | Facility: CLINIC | Age: 52
End: 2017-12-18

## 2017-12-18 VITALS
DIASTOLIC BLOOD PRESSURE: 96 MMHG | WEIGHT: 173.06 LBS | BODY MASS INDEX: 29.55 KG/M2 | HEIGHT: 64 IN | SYSTOLIC BLOOD PRESSURE: 170 MMHG | OXYGEN SATURATION: 97 % | HEART RATE: 72 BPM

## 2017-12-18 DIAGNOSIS — J31.0 CHRONIC RHINITIS, UNSPECIFIED TYPE: ICD-10-CM

## 2017-12-18 DIAGNOSIS — E04.2 MULTINODULAR GOITER: ICD-10-CM

## 2017-12-18 DIAGNOSIS — L30.9 DERMATITIS: Primary | ICD-10-CM

## 2017-12-18 PROCEDURE — 99999 PR PBB SHADOW E&M-EST. PATIENT-LVL III: CPT | Mod: PBBFAC,,, | Performed by: ALLERGY & IMMUNOLOGY

## 2017-12-18 PROCEDURE — 99213 OFFICE O/P EST LOW 20 MIN: CPT | Mod: 25,S$GLB,, | Performed by: ALLERGY & IMMUNOLOGY

## 2017-12-18 PROCEDURE — 95018 ALL TSTG PERQ&IQ DRUGS/BIOL: CPT | Mod: S$GLB,,, | Performed by: ALLERGY & IMMUNOLOGY

## 2017-12-18 RX ORDER — AMLODIPINE BESYLATE 10 MG/1
TABLET ORAL
Qty: 30 TABLET | Refills: 11 | Status: SHIPPED | OUTPATIENT
Start: 2017-12-18 | End: 2018-11-15 | Stop reason: SDUPTHER

## 2017-12-18 NOTE — PROGRESS NOTES
Pastora Cota returns to clinic today for continued evaluation of a rash and possible penicillin allergy. She is here alone. She was last seen February 14, 2017.    Her initial rash did occur when she was taking Augmentin. She was labeled as allergic to penicillin. She has not taken since.    After this she did have a rash when taking sulfa medications. The rash resolved when she discontinued these.    She has been eating shellfish without any difficulty.    She denies any rhinitis or conjunctivitis. She denies any cough, wheezing, or shortness of breath.    [facility] PEQ ALLERGY QUESTIONNAIRE SHORT 12/12/2017   Are you taking any new medications since your last visit? Yes   Constitution: No symptoms   Head or facial pain: No symptoms   Eyes: No symptoms   Ears: No symptoms   Nose: No symptoms   Throat: No symptoms   Sinuses: No symptoms   Lungs: No symptoms   Skin: No symptoms   Cardiovascular: No symptoms   Gastrointestinal: No symptoms   Genital/ urinary No symptoms   Musculoskeletal: No symptoms   Neurologic: No symptoms   Endocrine: No symptoms   Hematologic: No symptoms     Physical Examination:  General: Well-developed, well-nourished, no acute distress.  Head: No sinus tenderness.  Eyes: Conjunctivae:  No bulbar or palpebral conjunctival injection.  Ears: EAC's clear.  TM's clear.  No pre-auricular nodes.  Nose: Nasal Mucosa:  Pink.  Septum: No apparent deviation.  Turbinates:  No significant edema.  Polyps/Mass:  None visible.  Teeth/Gums:  No bleeding noted.  Oropharynx: No exudates.  Neck: Supple without thyromegaly. No cervical lymphadenopathy.    Respiratory/Chest: Effort: Good.  Auscultation:  Clear bilaterally.  Skin: Good turgor.  No urticaria or angioedema.   Neuro/Psych: Oriented x 3.    Laboratory 11/28/2016:  IgE level: Less than 35.  ImmunoCAP: Negative.  Thyroid peroxidase antibody level: Less than 6.0.  Thyroglobulin antibody level: Less than 4.0.  Vitamin D level: 33.    Laboratory  2/14/2017:  ImmunoCAP Shellfish: Negative.    Penicillin skin testing 12/18/2017:3+ histamine control. All tests were negative.    Assessment:  1. Seborrheic dermatitis, doubt food allergy.  2. No evidence of penicillin allergy.  3. Angioedema of uncertain etiology, consider secondary to ACE inhibitor.  4. Chronic rhinitis, doubt allergic.  5. Hypertension on hydrochlorothiazide and amlodipine.  6. Hyperlipidemia.  7. Colitis on sulfasalazine.  8. Multinodular thyroid disease.  9. Migraine headaches.    Recommendations:  1. She may take penicillin in the future if needed  2. Consider inhalant skin testing in the future if rhinitis persists.  3. Return to clinic as needed.

## 2017-12-27 ENCOUNTER — SURGERY (OUTPATIENT)
Age: 52
End: 2017-12-27

## 2017-12-27 ENCOUNTER — HOSPITAL ENCOUNTER (OUTPATIENT)
Facility: HOSPITAL | Age: 52
Discharge: HOME | End: 2017-12-27
Attending: INTERNAL MEDICINE | Admitting: INTERNAL MEDICINE
Payer: COMMERCIAL

## 2017-12-27 ENCOUNTER — ANESTHESIA (OUTPATIENT)
Dept: ENDOSCOPY | Facility: HOSPITAL | Age: 52
End: 2017-12-27
Payer: COMMERCIAL

## 2017-12-27 ENCOUNTER — ANESTHESIA EVENT (OUTPATIENT)
Dept: ENDOSCOPY | Facility: HOSPITAL | Age: 52
End: 2017-12-27
Payer: COMMERCIAL

## 2017-12-27 VITALS
HEART RATE: 67 BPM | BODY MASS INDEX: 26.66 KG/M2 | RESPIRATION RATE: 17 BRPM | SYSTOLIC BLOOD PRESSURE: 158 MMHG | OXYGEN SATURATION: 100 % | HEIGHT: 65 IN | WEIGHT: 160 LBS | TEMPERATURE: 98 F | DIASTOLIC BLOOD PRESSURE: 91 MMHG

## 2017-12-27 DIAGNOSIS — Z80.3 FAMILY HISTORY OF BREAST CANCER IN FIRST DEGREE RELATIVE: Primary | ICD-10-CM

## 2017-12-27 PROCEDURE — 45378 DIAGNOSTIC COLONOSCOPY: CPT | Performed by: INTERNAL MEDICINE

## 2017-12-27 PROCEDURE — D9220A PRA ANESTHESIA: Mod: ANES,,, | Performed by: ANESTHESIOLOGY

## 2017-12-27 PROCEDURE — 37000008 HC ANESTHESIA 1ST 15 MINUTES: Performed by: INTERNAL MEDICINE

## 2017-12-27 PROCEDURE — 63600175 PHARM REV CODE 636 W HCPCS: Performed by: NURSE ANESTHETIST, CERTIFIED REGISTERED

## 2017-12-27 PROCEDURE — 37000009 HC ANESTHESIA EA ADD 15 MINS: Performed by: INTERNAL MEDICINE

## 2017-12-27 PROCEDURE — 45378 DIAGNOSTIC COLONOSCOPY: CPT | Mod: ,,, | Performed by: INTERNAL MEDICINE

## 2017-12-27 PROCEDURE — D9220A PRA ANESTHESIA: Mod: CRNA,,, | Performed by: NURSE ANESTHETIST, CERTIFIED REGISTERED

## 2017-12-27 PROCEDURE — 25000003 PHARM REV CODE 250: Performed by: INTERNAL MEDICINE

## 2017-12-27 RX ORDER — PROPOFOL 10 MG/ML
VIAL (ML) INTRAVENOUS CONTINUOUS PRN
Status: DISCONTINUED | OUTPATIENT
Start: 2017-12-27 | End: 2017-12-27

## 2017-12-27 RX ORDER — SODIUM CHLORIDE 9 MG/ML
INJECTION, SOLUTION INTRAVENOUS CONTINUOUS
Status: DISCONTINUED | OUTPATIENT
Start: 2017-12-27 | End: 2017-12-27 | Stop reason: HOSPADM

## 2017-12-27 RX ORDER — LIDOCAINE HCL/PF 100 MG/5ML
SYRINGE (ML) INTRAVENOUS
Status: DISCONTINUED | OUTPATIENT
Start: 2017-12-27 | End: 2017-12-27

## 2017-12-27 RX ORDER — PROPOFOL 10 MG/ML
VIAL (ML) INTRAVENOUS
Status: DISCONTINUED | OUTPATIENT
Start: 2017-12-27 | End: 2017-12-27

## 2017-12-27 RX ADMIN — PROPOFOL 70 MG: 10 INJECTION, EMULSION INTRAVENOUS at 12:12

## 2017-12-27 RX ADMIN — SODIUM CHLORIDE: 900 INJECTION, SOLUTION INTRAVENOUS at 12:12

## 2017-12-27 RX ADMIN — PROPOFOL 150 MCG/KG/MIN: 10 INJECTION, EMULSION INTRAVENOUS at 12:12

## 2017-12-27 RX ADMIN — LIDOCAINE HYDROCHLORIDE 75 MG: 20 INJECTION, SOLUTION INTRAVENOUS at 12:12

## 2017-12-27 NOTE — TRANSFER OF CARE
"Anesthesia Transfer of Care Note    Patient: Pastora Cota    Procedure(s) Performed: Procedure(s) (LRB):  COLONOSCOPY (N/A)    Patient location: GI    Anesthesia Type: general    Transport from OR: Transported from OR on room air with adequate spontaneous ventilation    Post pain: adequate analgesia    Post assessment: no apparent anesthetic complications and tolerated procedure well    Post vital signs: stable    Level of consciousness: awake    Complications: none    Transfer of care protocol was followed      Last vitals:   Visit Vitals  BP (!) 147/86 (BP Location: Left arm, Patient Position: Lying)   Pulse 77   Temp 36.7 °C (98.1 °F) (Temporal)   Resp 17   Ht 5' 5" (1.651 m)   Wt 72.6 kg (160 lb)   SpO2 99%   Breastfeeding? No   BMI 26.63 kg/m²     "

## 2017-12-27 NOTE — ANESTHESIA PREPROCEDURE EVALUATION
12/27/2017  Pastora Cota is a 52 y.o., female.    Anesthesia Evaluation         Review of Systems  Anesthesia Hx:  No problems with previous Anesthesia   Cardiovascular:   Exercise tolerance: good Hypertension, well controlled  Denies Angina. hyperlipidemia    Pulmonary:  Pulmonary Normal    Endocrine:  Endocrine Normal        Physical Exam  General:  Well nourished    Airway/Jaw/Neck:  Airway Findings: Mouth Opening: Normal Tongue: Normal  General Airway Assessment: Adult  Mallampati: II  TM Distance: Normal, at least 6 cm  Jaw/Neck Findings:     Neck ROM: Normal ROM      Dental:  Dental Findings: In tact, Prominent Incisors   Chest/Lungs:  Chest/Lungs Findings: Clear to auscultation, Normal Respiratory Rate     Heart/Vascular:  Heart Findings: Rate: Normal  Rhythm: Regular Rhythm  Sounds: Normal        Mental Status:  Mental Status Findings:  Cooperative, Alert and Oriented         Anesthesia Plan  Type of Anesthesia, risks & benefits discussed:  Anesthesia Type:  general  Patient's Preference:   Intra-op Monitoring Plan: standard ASA monitors  Intra-op Monitoring Plan Comments:   Post Op Pain Control Plan:   Post Op Pain Control Plan Comments:   Induction:   IV  Beta Blocker:  Patient is not currently on a Beta-Blocker (No further documentation required).       Informed Consent: Patient understands risks and agrees with Anesthesia plan.  Questions answered. Anesthesia consent signed with patient.  ASA Score: 2     Day of Surgery Review of History & Physical:            Ready For Surgery From Anesthesia Perspective.

## 2017-12-27 NOTE — ANESTHESIA POSTPROCEDURE EVALUATION
"Anesthesia Post Evaluation    Patient: Pastora Cota    Procedure(s) Performed: Procedure(s) (LRB):  COLONOSCOPY (N/A)    Final Anesthesia Type: general  Patient location during evaluation: PACU  Patient participation: Yes- Able to Participate  Level of consciousness: awake and alert  Post-procedure vital signs: reviewed and stable  Pain management: adequate  Airway patency: patent  PONV status at discharge: No PONV  Anesthetic complications: no      Cardiovascular status: hemodynamically stable and blood pressure returned to baseline  Respiratory status: unassisted and spontaneous ventilation  Hydration status: euvolemic  Follow-up not needed.        Visit Vitals  BP (!) 158/91   Pulse 67   Temp 36.7 °C (98.1 °F)   Resp 17   Ht 5' 5" (1.651 m)   Wt 72.6 kg (160 lb)   SpO2 100%   Breastfeeding? No   BMI 26.63 kg/m²       Pain/Lissette Score: Pain Assessment Performed: Yes (12/27/2017  1:35 PM)  Presence of Pain: denies (12/27/2017  1:35 PM)  Lissette Score: 10 (12/27/2017  1:35 PM)      "

## 2017-12-27 NOTE — PROVATION PATIENT INSTRUCTIONS
Discharge Summary/Instructions after an Endoscopic Procedure  Patient Name: Pastora Cota  Patient MRN: [id number]  Patient YOB: 1965 Wednesday, December 27, 2017  Johnny Clayton MD  RESTRICTIONS:  During your procedure today, you received medications for sedation.  These   medications may affect your judgment, balance and coordination.  Therefore,   for 24 hours, you have the following restrictions:   - DO NOT drive a car, operate machinery, make legal/financial decisions,   sign important papers or drink alcohol.    ACTIVITY:  The following day: return to full activity including work, except no heavy   lifting, straining or running for 3 days if polyps were removed.  DIET:  Eat and drink normally unless instructed otherwise.     TREATMENT FOR COMMON SIDE EFFECTS:  - Mild abdominal pain, belching, bloating or excessive gas: rest, eat   lightly and use a heating pad.  - Sore Throat: treat with throat lozenges and/or gargle with warm salt   water.  SYMPTOMS TO WATCH FOR AND REPORT TO YOUR PHYSICIAN:  1. Abdominal pain or bloating, other than gas cramps.  2. Chest pain.  3. Back pain.  4. Chills or fever occurring within 24 hours after the procedure.  5. Rectal bleeding, which would show as bright red, maroon, or black stools.   (A tablespoon of blood from the rectum is not serious, especially if   hemorrhoids are present.)  6. Vomiting.  7. Weakness or dizziness.  8. Because air was used during the procedure, expelling large amounts of air   from your rectum or belching is normal.  9. If a bowel prep was taken, you may not have a bowel movement for 1-3   days.  This is normal.  GO DIRECTLY TO THE NEAREST EMERGENCY ROOM IF YOU HAVE ANY OF THE FOLLOWING:      Difficulty breathing  Chills and/or fever over 101 F   Persistent vomiting and/or vomiting blood   Severe abdominal pain   Severe chest pain   Black, tarry stools   Bleeding- more than one tablespoon   Any other symptom or condition that you may feel  needs urgent attention  Your doctor recommends these additional instructions:  If any biopsies were taken, your doctor s clinic will contact you in 1 to 2   weeks with any results.  You have a contact number available for emergencies.  The signs and symptoms   of potential delayed complications were discussed with you.  You may return   to normal activities tomorrow.  Written discharge instructions were   provided to you.   You are being discharged to home.   Your physician has recommended a repeat colonoscopy in 10 years for   screening purposes.   The findings and recommendations were discussed with your designated   responsible adult.  For questions, problems or results please call your physician - Johnny Clayton MD at Work:  [phone].  [facility], EMERGENCY ROOM PHONE NUMBER: [phone]  IF A COMPLICATION OR EMERGENCY SITUATION ARISES AND YOU ARE UNABLE TO REACH   YOUR PHYSICIAN - GO DIRECTLY TO THE EMERGENCY ROOM.  Johnny Clayton MD  12/27/2017 1:06:46 PM  This report has been verified and signed electronically.

## 2018-01-03 ENCOUNTER — TELEPHONE (OUTPATIENT)
Dept: ENDOSCOPY | Facility: HOSPITAL | Age: 53
End: 2018-01-03

## 2018-01-04 ENCOUNTER — LAB VISIT (OUTPATIENT)
Dept: LAB | Facility: HOSPITAL | Age: 53
End: 2018-01-04
Attending: INTERNAL MEDICINE
Payer: COMMERCIAL

## 2018-01-04 DIAGNOSIS — I10 ESSENTIAL HYPERTENSION: ICD-10-CM

## 2018-01-04 DIAGNOSIS — E83.52 HYPERCALCEMIA: ICD-10-CM

## 2018-01-04 DIAGNOSIS — E04.2 MULTINODULAR GOITER: ICD-10-CM

## 2018-01-04 LAB
ALBUMIN SERPL BCP-MCNC: 3.8 G/DL
ALP SERPL-CCNC: 128 U/L
ALT SERPL W/O P-5'-P-CCNC: 22 U/L
ANION GAP SERPL CALC-SCNC: 5 MMOL/L
AST SERPL-CCNC: 19 U/L
BILIRUB SERPL-MCNC: 0.3 MG/DL
BUN SERPL-MCNC: 12 MG/DL
CALCIUM SERPL-MCNC: 10.3 MG/DL
CHLORIDE SERPL-SCNC: 106 MMOL/L
CO2 SERPL-SCNC: 28 MMOL/L
CREAT SERPL-MCNC: 0.9 MG/DL
EST. GFR  (AFRICAN AMERICAN): >60 ML/MIN/1.73 M^2
EST. GFR  (NON AFRICAN AMERICAN): >60 ML/MIN/1.73 M^2
GLUCOSE SERPL-MCNC: 95 MG/DL
POTASSIUM SERPL-SCNC: 3.9 MMOL/L
PROT SERPL-MCNC: 8.2 G/DL
PTH-INTACT SERPL-MCNC: 155 PG/ML
SODIUM SERPL-SCNC: 139 MMOL/L
TSH SERPL DL<=0.005 MIU/L-ACNC: 1.12 UIU/ML

## 2018-01-04 PROCEDURE — 36415 COLL VENOUS BLD VENIPUNCTURE: CPT | Mod: PO

## 2018-01-04 PROCEDURE — 83970 ASSAY OF PARATHORMONE: CPT

## 2018-01-04 PROCEDURE — 84443 ASSAY THYROID STIM HORMONE: CPT

## 2018-01-04 PROCEDURE — 80053 COMPREHEN METABOLIC PANEL: CPT

## 2018-01-10 ENCOUNTER — OFFICE VISIT (OUTPATIENT)
Dept: ENDOCRINOLOGY | Facility: CLINIC | Age: 53
End: 2018-01-10
Payer: COMMERCIAL

## 2018-01-10 VITALS
SYSTOLIC BLOOD PRESSURE: 170 MMHG | HEART RATE: 88 BPM | DIASTOLIC BLOOD PRESSURE: 100 MMHG | HEIGHT: 65 IN | BODY MASS INDEX: 28.5 KG/M2 | WEIGHT: 171.06 LBS

## 2018-01-10 DIAGNOSIS — E04.2 MULTINODULAR GOITER: ICD-10-CM

## 2018-01-10 DIAGNOSIS — E21.3 HYPERPARATHYROIDISM: Primary | ICD-10-CM

## 2018-01-10 DIAGNOSIS — E83.52 HYPERCALCEMIA: ICD-10-CM

## 2018-01-10 DIAGNOSIS — I10 ESSENTIAL HYPERTENSION: ICD-10-CM

## 2018-01-10 PROCEDURE — 99214 OFFICE O/P EST MOD 30 MIN: CPT | Mod: S$GLB,,, | Performed by: INTERNAL MEDICINE

## 2018-01-10 PROCEDURE — 99999 PR PBB SHADOW E&M-EST. PATIENT-LVL III: CPT | Mod: PBBFAC,,, | Performed by: INTERNAL MEDICINE

## 2018-01-10 NOTE — PROGRESS NOTES
Subjective:      Patient ID: Pastora Cota is a 52 y.o. female.    Chief Complaint:  Hypercalcemia      History of Present Illness  In 2011 she presented with palpitations   She had a ct of the chest  The goiter was noted at that time  She had an u/s done and was advised to f/u  No fnas done      Last thyroid ultrasound 2015  Stable thyroid cysts.     RECOMMENDATIONS:   Follow up ultrasound in  5 years     she does not notice the goiter  No voice changes, no difficulty swallowing or breathing  no FH of thyroid disease or disease   no h/o radiation treatment or exposure  Did have light therapy for acne     Less fatigue  Less  panic attacks    Denies polyuria, polydipsia, nocturia,or blurred vision.     She takes 1200 mg calcium daily plus 3-4 dairy servings.  She is now off HCTZ.  Denies constipation, bone pain, weakness.     bmd 12/8/16  Low bone mass/osteopenia of the lumbar spine and total hip. FRAX calculations do not suggest treatment.    HTN: home readings 127/67      Started DPP (diabetes prevention program) with [facility]     +weight gain            Review of Systems   Constitutional: Negative for unexpected weight change.   Eyes: Negative for visual disturbance.   Respiratory: Negative for shortness of breath.    Cardiovascular: Negative for chest pain.   Gastrointestinal: Negative for abdominal pain.   Musculoskeletal: Negative for arthralgias.   Skin: Negative for wound.   Neurological: Negative for headaches.   Hematological: Does not bruise/bleed easily.   Psychiatric/Behavioral: Negative for sleep disturbance.       Objective:   Physical Exam   Neck: No thyromegaly present.   Cardiovascular: Normal rate.    Pulmonary/Chest: Effort normal.   Abdominal: Soft.   Musculoskeletal: She exhibits no edema.   Vitals reviewed.      Body mass index is 28.47 kg/m².    Lab Review:   Lab Results   Component Value Date    HGBA1C 5.6 12/02/2016     Lab Results   Component Value Date    CHOL 210 (H)  02/23/2013    HDL 40 02/23/2013    LDLCALC 151.0 02/23/2013    TRIG 94 02/23/2013    CHOLHDL 19.0 (L) 02/23/2013     Lab Results   Component Value Date     01/04/2018    K 3.9 01/04/2018     01/04/2018    CO2 28 01/04/2018    GLU 95 01/04/2018    BUN 12 01/04/2018    CREATININE 0.9 01/04/2018    CALCIUM 10.3 01/04/2018    PROT 8.2 01/04/2018    ALBUMIN 3.8 01/04/2018    BILITOT 0.3 01/04/2018    ALKPHOS 128 01/04/2018    AST 19 01/04/2018    ALT 22 01/04/2018    ANIONGAP 5 (L) 01/04/2018    ESTGFRAFRICA >60.0 01/04/2018    EGFRNONAA >60.0 01/04/2018    TSH 1.124 01/04/2018       Assessment and Plan     Multinodular goiter  Recheck 2020       Hypercalcemia  Episodic with hyperparathyroidism  Check urine studies  Follow bmd  Follow cmp q  6 months     Hyperparathyroidism  See hypercalcemia     Hypertension  Controlled at home  Keep log

## 2018-01-12 ENCOUNTER — PATIENT MESSAGE (OUTPATIENT)
Dept: ENDOCRINOLOGY | Facility: CLINIC | Age: 53
End: 2018-01-12

## 2018-01-12 RX ORDER — FUROSEMIDE 20 MG/1
20 TABLET ORAL DAILY
Qty: 90 TABLET | Refills: 3 | Status: SHIPPED | OUTPATIENT
Start: 2018-01-12 | End: 2018-11-15 | Stop reason: SDUPTHER

## 2018-02-06 ENCOUNTER — PATIENT MESSAGE (OUTPATIENT)
Dept: ENDOCRINOLOGY | Facility: CLINIC | Age: 53
End: 2018-02-06

## 2018-03-18 ENCOUNTER — OFFICE VISIT (OUTPATIENT)
Dept: URGENT CARE | Facility: CLINIC | Age: 53
End: 2018-03-18
Payer: COMMERCIAL

## 2018-03-18 VITALS
HEIGHT: 65 IN | OXYGEN SATURATION: 100 % | DIASTOLIC BLOOD PRESSURE: 97 MMHG | RESPIRATION RATE: 18 BRPM | SYSTOLIC BLOOD PRESSURE: 153 MMHG | TEMPERATURE: 100 F | BODY MASS INDEX: 28.49 KG/M2 | HEART RATE: 100 BPM | WEIGHT: 171 LBS

## 2018-03-18 DIAGNOSIS — R30.0 DYSURIA: ICD-10-CM

## 2018-03-18 DIAGNOSIS — N10 PYELONEPHRITIS, ACUTE: Primary | ICD-10-CM

## 2018-03-18 LAB
BILIRUB UR QL STRIP: POSITIVE
GLUCOSE UR QL STRIP: POSITIVE
KETONES UR QL STRIP: NEGATIVE
LEUKOCYTE ESTERASE UR QL STRIP: POSITIVE
PH, POC UA: 5.5
POC BLOOD, URINE: NEGATIVE
POC NITRATES, URINE: POSITIVE
PROT UR QL STRIP: POSITIVE
SP GR UR STRIP: 1.02 (ref 1–1.03)
UROBILINOGEN UR STRIP-ACNC: ABNORMAL (ref 0.1–1.1)

## 2018-03-18 PROCEDURE — 3080F DIAST BP >= 90 MM HG: CPT | Mod: CPTII,S$GLB,, | Performed by: NURSE PRACTITIONER

## 2018-03-18 PROCEDURE — 3077F SYST BP >= 140 MM HG: CPT | Mod: CPTII,S$GLB,, | Performed by: NURSE PRACTITIONER

## 2018-03-18 PROCEDURE — 81003 URINALYSIS AUTO W/O SCOPE: CPT | Mod: QW,S$GLB,, | Performed by: NURSE PRACTITIONER

## 2018-03-18 PROCEDURE — 99214 OFFICE O/P EST MOD 30 MIN: CPT | Mod: SA,25,S$GLB, | Performed by: NURSE PRACTITIONER

## 2018-03-18 RX ORDER — CIPROFLOXACIN 500 MG/1
500 TABLET ORAL 2 TIMES DAILY
Qty: 14 TABLET | Refills: 0 | Status: SHIPPED | OUTPATIENT
Start: 2018-03-18 | End: 2018-03-25

## 2018-03-18 RX ORDER — CEFTRIAXONE 1 G/1
1 INJECTION, POWDER, FOR SOLUTION INTRAMUSCULAR; INTRAVENOUS
Status: COMPLETED | OUTPATIENT
Start: 2018-03-18 | End: 2018-03-18

## 2018-03-18 RX ADMIN — CEFTRIAXONE 1 G: 1 INJECTION, POWDER, FOR SOLUTION INTRAMUSCULAR; INTRAVENOUS at 03:03

## 2018-03-18 NOTE — PATIENT INSTRUCTIONS
"Rocephin injection given in clinic today.   Cipro 2 times per day for 7 days. Take until completion.   Urine culture sent out. We will call you with results.   ER if s/s worsen or do not improve in 24-36 hours as we discussed today.     Monitor BP at home.     Elevated Blood Pressure  Your blood pressure was elevated during your visit to the urgent care.  It was not so high that immediate care was needed but it is recommended that you monitor your blood pressure over the next week or two to make sure that it is not staying elevated.  Please have your blood pressure taken 2-3 times daily at different times of the day.  Write all of those blood pressures down and record the time that they were taken.  Keep all that information and take it with you to see your Primary Care Physician.  If your blood pressure is consistently above 140/90 you will need to follow up with your PCP more quickly    If you've had labs sent out, it will generally take 4-7 days for results to return. We will call you with the results.        Kidney Infection (Adult, Female)    An infection in one or both kidneys is called "pyelonephritis." It usually happens when bacteria (or rarely, viruses, fungi, or other disease-causing organisms) get into the kidney. The bacteria (or other disease-causing organisms) can enter the kidneys from the bladder or blood traveling from other parts of the body. A kidney infection can become serious. It can cause severe illness, scarring of the kidneys, or kidney failure if not treated properly.  Common causes for this problem include:  · Not keeping the genital area clean and dry, which promotes the growth of bacteria  · Wiping back to front which drags bacteria from the rectum toward the urinary opening (urethra)  · Wearing tight pants or underwear (this lets moisture build up in the genital area, which helps bacteria grow)  · Holding urine in for long periods of time  · Dehydration  Kidney infections can cause " symptoms similar to a bladder infection. Symptoms include:  · Pain (or burning) when urinating  · Having to urinate more often than usual  · Blood in the urine (pink or red)  · Abdominal pain or discomfort, usually in the lower abdomen  · Pain in the side or back  · Pain above the pubic bone  · Fever or chills  · Vomiting  · Loss of appetite  Treatment is oral antibiotics, or in more severe cases, intramuscular or IV antibiotics. These are started right away and may be changed once urine culture results determine the infecting organisms. Treatment helps prevent a more serious kidney infection.  Medicines  Medicines can help in the treatment of a bladder infection:  · Take antibiotics until they are used up, even if you feel better. It is important to finish them to make sure the infection is gone.  · Unless another medicine was prescribed, you can use over-the-counter medicines for pain, fever, or discomfort. If you have chronic liver of kidney disease, talk with your healthcare provider before using these medicines. Also talk with your provider if you've ever had a stomach ulcer or gastrointestinal (GI) bleeding, or are taking blood thinners.  Home care  The following are general care guidelines:  · Stay home from work or school. Rest in bed until your fever breaks and you are feeling better, or as advised by your healthcare provider.  · Drink lots of fluid unless you must restrict fluids for other medical reasons. This will force the medicine into your urinary system and flush the bacteria out of your body. Ask your healthcare provider how much you should drink.  · Avoid sex until you have finished all of your medicine and your symptoms are gone.  · Avoid caffeine, alcohol, and spicy foods. These foods may irritate the kidneys and bladder.  · Avoid taking bubble baths. Sensitivity to the chemicals in bubble baths can irritate the urethra.  · Make sure you wipe from front to back after using the toilet.  · Wear  loose cloths and cotton underwear.  Prevention  These self-care steps can help prevent future infections:  · Drink plenty of fluids to prevent dehydration and flush out the bladder. Do this unless you must restrict fluids for other health reasons, or your healthcare provider told you not to.  · Proper cleaning after going to the bathroom in important. Make sure you wipe from front to back after using the toilet.  · Urinate more often. Don't try to hold urine in for a long time.  · Avoid tight-fitting pants and underwear.  · Improve your diet to prevent constipation. Eat more fruits, vegetables, and fiber. Eat less junk and fatty foods. Constipation can make a urinary tract infection more likely. Talk with your healthcare provider if you have trouble with bowel movements.  · Urinate right after intercourse to flush out the bladder.  Follow-up care  Follow up with your healthcare provider, or as advised. Additional testing may be needed to make sure the infection has cleared. Close follow-up and further testing is very important to find the cause and to prevent future infections.  If a urine culture was done, you will be contacted if your treatment needs to be changed. If directed, you may call to find out the results.  If you had an X-ray, CT scan, or other diagnostic test, you will be notified of any new findings that may affect your care.  Call 911  Call 911 if any of the following occur:  · Trouble breathing  · Fainting or loss of consciousness  · Rapid or very slow heart rate  · Weakness, dizziness, or fainting  · Difficulty arousing or confusion  When to seek medical advice  Call your healthcare provider right away if any of these occur:  · Fever 100.4°F (38°C) or higher after 48 hours of treatment, or as advised by your healthcare provider  · Not feeling better within 1 to 2 days after starting antibiotics  · Any symptom that continues after 3 days of treatment  · Increasing pain in the stomach, back, side, or  groin area  · Repeated vomiting  · Not able to take prescribed medicine due to nausea or another reason  · Bloody, dark-colored, or foul smelling urine  · Trouble urinating or decreased urine output  · No urine for 8 hours, no tears when crying, sunken eyes, or dry mouth  Date Last Reviewed: 10/1/2016  © 0685-6984 [employer]. [address] [place] [address], [place] [address]. All rights reserved. This information is not intended as a substitute for professional medical care. Always follow your healthcare professional's instructions.

## 2018-03-18 NOTE — PROGRESS NOTES
"Subjective:       Patient ID: Pastora Cota is a 52 y.o. female.    Vitals:  height is 5' 5" (1.651 m) and weight is 77.6 kg (171 lb). Her temperature is 99.9 °F (37.7 °C). Her blood pressure is 153/97 (abnormal) and her pulse is 100. Her respiration is 18 and oxygen saturation is 100%.     Chief Complaint: Urinary Tract Infection    Pt states that her symptoms began on Friday and gradually worsening.     C/o right sided flank pain since Friday that is worsening. + dysuria, hesitancy, frequency.  Taking Azo since Friday.   Denies frequent UTIs, vaginal discharge, or STD exposure. Flank pain increases with movement and has CVA tenderness.   Low grade fever in clinic today. Nontoxic in appearance.   Denies n/v.         Urinary Tract Infection    This is a new problem. The current episode started in the past 7 days. The problem occurs every urination. The problem has been gradually worsening. The quality of the pain is described as stabbing. The pain is at a severity of 10/10. The pain is severe. Maximum temperature: 99.9. She is sexually active. There is no history of pyelonephritis. Associated symptoms include flank pain, frequency and urgency. Pertinent negatives include no chills, hematuria, nausea or vomiting. Treatments tried: AZO. The treatment provided no relief.     Review of Systems   Constitution: Positive for fever. Negative for chills.   Skin: Negative for itching.   Musculoskeletal: Positive for back pain.   Gastrointestinal: Negative for abdominal pain, nausea and vomiting.   Genitourinary: Positive for dysuria, flank pain, frequency and urgency. Negative for genital sores, hematuria, missed menses and non-menstrual bleeding.       Objective:      Physical Exam   Constitutional: She is oriented to person, place, and time. She appears well-developed and well-nourished.  Non-toxic appearance. She does not have a sickly appearance. She does not appear ill. No distress.   HENT:   Head: Normocephalic " and atraumatic.   Right Ear: External ear normal.   Left Ear: External ear normal.   Nose: Nose normal. No nasal deformity. No epistaxis.   Mouth/Throat: Oropharynx is clear and moist and mucous membranes are normal.   Eyes: Conjunctivae, EOM and lids are normal. Pupils are equal, round, and reactive to light.   Neck: Trachea normal, normal range of motion and phonation normal. Neck supple.   Cardiovascular: Normal rate, regular rhythm, normal heart sounds and normal pulses.    Pulmonary/Chest: Effort normal and breath sounds normal.   Abdominal: Soft. Normal appearance and bowel sounds are normal. She exhibits no distension and no mass. There is no tenderness. There is CVA tenderness (right sided ).   Musculoskeletal: Normal range of motion. She exhibits no edema or deformity.   Neurological: She is alert and oriented to person, place, and time.   Skin: Skin is warm, dry and intact. Capillary refill takes less than 2 seconds.   Psychiatric: She has a normal mood and affect. Her speech is normal and behavior is normal. Cognition and memory are normal.   Nursing note and vitals reviewed.      Assessment:       1. Pyelonephritis, acute    2. Dysuria        Plan:         Pyelonephritis, acute  -     ciprofloxacin HCl (CIPRO) 500 MG tablet; Take 1 tablet (500 mg total) by mouth 2 (two) times daily.  Dispense: 14 tablet; Refill: 0  -     Urine culture  -     cefTRIAXone injection 1 g; Inject 1 g into the muscle one time.    Dysuria  -     POCT Urinalysis, Dipstick, Automated, W/O Scope      Nontoxic in appearance. Ok to treat outpatient. Discussed warning s.s for ER. Urine culture pending. UA skewed due to Azo.     Patient Instructions   Rocephin injection given in clinic today.   Cipro 2 times per day for 7 days. Take until completion.   Urine culture sent out. We will call you with results.   ER if s/s worsen or do not improve in 24-36 hours as we discussed today.     Monitor BP at home.     Elevated Blood  "Pressure  Your blood pressure was elevated during your visit to the urgent care.  It was not so high that immediate care was needed but it is recommended that you monitor your blood pressure over the next week or two to make sure that it is not staying elevated.  Please have your blood pressure taken 2-3 times daily at different times of the day.  Write all of those blood pressures down and record the time that they were taken.  Keep all that information and take it with you to see your Primary Care Physician.  If your blood pressure is consistently above 140/90 you will need to follow up with your PCP more quickly    If you've had labs sent out, it will generally take 4-7 days for results to return. We will call you with the results.        Kidney Infection (Adult, Female)    An infection in one or both kidneys is called "pyelonephritis." It usually happens when bacteria (or rarely, viruses, fungi, or other disease-causing organisms) get into the kidney. The bacteria (or other disease-causing organisms) can enter the kidneys from the bladder or blood traveling from other parts of the body. A kidney infection can become serious. It can cause severe illness, scarring of the kidneys, or kidney failure if not treated properly.  Common causes for this problem include:  · Not keeping the genital area clean and dry, which promotes the growth of bacteria  · Wiping back to front which drags bacteria from the rectum toward the urinary opening (urethra)  · Wearing tight pants or underwear (this lets moisture build up in the genital area, which helps bacteria grow)  · Holding urine in for long periods of time  · Dehydration  Kidney infections can cause symptoms similar to a bladder infection. Symptoms include:  · Pain (or burning) when urinating  · Having to urinate more often than usual  · Blood in the urine (pink or red)  · Abdominal pain or discomfort, usually in the lower abdomen  · Pain in the side or back  · Pain above " the pubic bone  · Fever or chills  · Vomiting  · Loss of appetite  Treatment is oral antibiotics, or in more severe cases, intramuscular or IV antibiotics. These are started right away and may be changed once urine culture results determine the infecting organisms. Treatment helps prevent a more serious kidney infection.  Medicines  Medicines can help in the treatment of a bladder infection:  · Take antibiotics until they are used up, even if you feel better. It is important to finish them to make sure the infection is gone.  · Unless another medicine was prescribed, you can use over-the-counter medicines for pain, fever, or discomfort. If you have chronic liver of kidney disease, talk with your healthcare provider before using these medicines. Also talk with your provider if you've ever had a stomach ulcer or gastrointestinal (GI) bleeding, or are taking blood thinners.  Home care  The following are general care guidelines:  · Stay home from work or school. Rest in bed until your fever breaks and you are feeling better, or as advised by your healthcare provider.  · Drink lots of fluid unless you must restrict fluids for other medical reasons. This will force the medicine into your urinary system and flush the bacteria out of your body. Ask your healthcare provider how much you should drink.  · Avoid sex until you have finished all of your medicine and your symptoms are gone.  · Avoid caffeine, alcohol, and spicy foods. These foods may irritate the kidneys and bladder.  · Avoid taking bubble baths. Sensitivity to the chemicals in bubble baths can irritate the urethra.  · Make sure you wipe from front to back after using the toilet.  · Wear loose cloths and cotton underwear.  Prevention  These self-care steps can help prevent future infections:  · Drink plenty of fluids to prevent dehydration and flush out the bladder. Do this unless you must restrict fluids for other health reasons, or your healthcare provider told  you not to.  · Proper cleaning after going to the bathroom in important. Make sure you wipe from front to back after using the toilet.  · Urinate more often. Don't try to hold urine in for a long time.  · Avoid tight-fitting pants and underwear.  · Improve your diet to prevent constipation. Eat more fruits, vegetables, and fiber. Eat less junk and fatty foods. Constipation can make a urinary tract infection more likely. Talk with your healthcare provider if you have trouble with bowel movements.  · Urinate right after intercourse to flush out the bladder.  Follow-up care  Follow up with your healthcare provider, or as advised. Additional testing may be needed to make sure the infection has cleared. Close follow-up and further testing is very important to find the cause and to prevent future infections.  If a urine culture was done, you will be contacted if your treatment needs to be changed. If directed, you may call to find out the results.  If you had an X-ray, CT scan, or other diagnostic test, you will be notified of any new findings that may affect your care.  Call 911  Call 911 if any of the following occur:  · Trouble breathing  · Fainting or loss of consciousness  · Rapid or very slow heart rate  · Weakness, dizziness, or fainting  · Difficulty arousing or confusion  When to seek medical advice  Call your healthcare provider right away if any of these occur:  · Fever 100.4°F (38°C) or higher after 48 hours of treatment, or as advised by your healthcare provider  · Not feeling better within 1 to 2 days after starting antibiotics  · Any symptom that continues after 3 days of treatment  · Increasing pain in the stomach, back, side, or groin area  · Repeated vomiting  · Not able to take prescribed medicine due to nausea or another reason  · Bloody, dark-colored, or foul smelling urine  · Trouble urinating or decreased urine output  · No urine for 8 hours, no tears when crying, sunken eyes, or dry mouth  Date  Last Reviewed: 10/1/2016  © 6305-2195 [employer], [employer] [address] [place] [address], [place] [address]. All rights reserved. This information is not intended as a substitute for professional medical care. Always follow your healthcare professional's instructions.

## 2018-03-21 ENCOUNTER — TELEPHONE (OUTPATIENT)
Dept: URGENT CARE | Facility: CLINIC | Age: 53
End: 2018-03-21

## 2018-03-21 LAB
BACTERIA UR CULT: NO GROWTH
BACTERIA UR CULT: NORMAL

## 2018-03-21 NOTE — TELEPHONE ENCOUNTER
pt given lab/culture results via phone. Pt states she is feeling better and will finish taking antibiotic.

## 2018-05-03 ENCOUNTER — HOSPITAL ENCOUNTER (OUTPATIENT)
Dept: RADIOLOGY | Facility: OTHER | Age: 53
Discharge: HOME | End: 2018-05-03
Attending: NURSE PRACTITIONER
Payer: COMMERCIAL

## 2018-05-03 DIAGNOSIS — R92.8 ABNORMAL MAMMOGRAM: ICD-10-CM

## 2018-05-03 PROCEDURE — 76642 ULTRASOUND BREAST LIMITED: CPT | Mod: 26,RT,, | Performed by: RADIOLOGY

## 2018-05-03 PROCEDURE — 76642 ULTRASOUND BREAST LIMITED: CPT | Mod: TC,RT

## 2018-05-18 DIAGNOSIS — Z11.59 NEED FOR HEPATITIS C SCREENING TEST: ICD-10-CM

## 2018-07-10 ENCOUNTER — LAB VISIT (OUTPATIENT)
Dept: LAB | Facility: HOSPITAL | Age: 53
End: 2018-07-10
Attending: INTERNAL MEDICINE
Payer: COMMERCIAL

## 2018-07-10 DIAGNOSIS — E04.2 MULTINODULAR GOITER: ICD-10-CM

## 2018-07-10 DIAGNOSIS — E21.3 HYPERPARATHYROIDISM: ICD-10-CM

## 2018-07-10 DIAGNOSIS — E83.52 HYPERCALCEMIA: ICD-10-CM

## 2018-07-10 LAB
ALBUMIN SERPL BCP-MCNC: 4 G/DL
ALP SERPL-CCNC: 127 U/L
ALT SERPL W/O P-5'-P-CCNC: 19 U/L
ANION GAP SERPL CALC-SCNC: 8 MMOL/L
AST SERPL-CCNC: 20 U/L
BILIRUB SERPL-MCNC: 0.3 MG/DL
BUN SERPL-MCNC: 12 MG/DL
CALCIUM SERPL-MCNC: 10.5 MG/DL
CHLORIDE SERPL-SCNC: 106 MMOL/L
CO2 SERPL-SCNC: 26 MMOL/L
CREAT SERPL-MCNC: 0.8 MG/DL
EST. GFR  (AFRICAN AMERICAN): >60 ML/MIN/1.73 M^2
EST. GFR  (NON AFRICAN AMERICAN): >60 ML/MIN/1.73 M^2
GLUCOSE SERPL-MCNC: 87 MG/DL
PHOSPHATE SERPL-MCNC: 3.5 MG/DL
POTASSIUM SERPL-SCNC: 4 MMOL/L
PROT SERPL-MCNC: 8.2 G/DL
SODIUM SERPL-SCNC: 140 MMOL/L

## 2018-07-10 PROCEDURE — 84100 ASSAY OF PHOSPHORUS: CPT

## 2018-07-10 PROCEDURE — 80053 COMPREHEN METABOLIC PANEL: CPT

## 2018-07-10 PROCEDURE — 36415 COLL VENOUS BLD VENIPUNCTURE: CPT | Mod: PO

## 2018-08-15 ENCOUNTER — OFFICE VISIT (OUTPATIENT)
Dept: INTERNAL MEDICINE | Facility: CLINIC | Age: 53
End: 2018-08-15
Attending: INTERNAL MEDICINE
Payer: COMMERCIAL

## 2018-08-15 ENCOUNTER — LAB VISIT (OUTPATIENT)
Dept: LAB | Facility: OTHER | Age: 53
End: 2018-08-15
Attending: INTERNAL MEDICINE
Payer: COMMERCIAL

## 2018-08-15 VITALS
OXYGEN SATURATION: 97 % | HEART RATE: 100 BPM | HEIGHT: 65 IN | BODY MASS INDEX: 28.17 KG/M2 | SYSTOLIC BLOOD PRESSURE: 144 MMHG | DIASTOLIC BLOOD PRESSURE: 88 MMHG | WEIGHT: 169.06 LBS

## 2018-08-15 DIAGNOSIS — D64.9 ANEMIA, UNSPECIFIED TYPE: ICD-10-CM

## 2018-08-15 DIAGNOSIS — Z00.00 ANNUAL PHYSICAL EXAM: Primary | ICD-10-CM

## 2018-08-15 DIAGNOSIS — R00.2 PALPITATIONS: ICD-10-CM

## 2018-08-15 DIAGNOSIS — Z11.59 NEED FOR HEPATITIS C SCREENING TEST: ICD-10-CM

## 2018-08-15 DIAGNOSIS — E78.5 HYPERLIPIDEMIA, UNSPECIFIED HYPERLIPIDEMIA TYPE: ICD-10-CM

## 2018-08-15 DIAGNOSIS — Z00.00 ANNUAL PHYSICAL EXAM: ICD-10-CM

## 2018-08-15 DIAGNOSIS — E83.52 HYPERCALCEMIA: ICD-10-CM

## 2018-08-15 DIAGNOSIS — H53.8 BLURRY VISION, RIGHT EYE: ICD-10-CM

## 2018-08-15 LAB
25(OH)D3+25(OH)D2 SERPL-MCNC: 13 NG/ML
ALBUMIN SERPL BCP-MCNC: 4.2 G/DL
ALP SERPL-CCNC: 113 U/L
ALT SERPL W/O P-5'-P-CCNC: 23 U/L
ANION GAP SERPL CALC-SCNC: 9 MMOL/L
AST SERPL-CCNC: 17 U/L
BASOPHILS # BLD AUTO: 0.04 K/UL
BASOPHILS NFR BLD: 0.6 %
BILIRUB SERPL-MCNC: 0.4 MG/DL
BUN SERPL-MCNC: 11 MG/DL
CALCIUM SERPL-MCNC: 10.4 MG/DL
CHLORIDE SERPL-SCNC: 104 MMOL/L
CHOLEST SERPL-MCNC: 215 MG/DL
CHOLEST/HDLC SERPL: 4.9 {RATIO}
CO2 SERPL-SCNC: 27 MMOL/L
CREAT SERPL-MCNC: 0.9 MG/DL
DIFFERENTIAL METHOD: ABNORMAL
EOSINOPHIL # BLD AUTO: 0.1 K/UL
EOSINOPHIL NFR BLD: 1.9 %
ERYTHROCYTE [DISTWIDTH] IN BLOOD BY AUTOMATED COUNT: 15.4 %
EST. GFR  (AFRICAN AMERICAN): >60 ML/MIN/1.73 M^2
EST. GFR  (NON AFRICAN AMERICAN): >60 ML/MIN/1.73 M^2
ESTIMATED AVG GLUCOSE: 117 MG/DL
FERRITIN SERPL-MCNC: 88 NG/ML
GLUCOSE SERPL-MCNC: 129 MG/DL
HBA1C MFR BLD HPLC: 5.7 %
HCT VFR BLD AUTO: 41.1 %
HDLC SERPL-MCNC: 44 MG/DL
HDLC SERPL: 20.5 %
HGB BLD-MCNC: 12.9 G/DL
IRON SERPL-MCNC: 48 UG/DL
LDLC SERPL CALC-MCNC: 155.2 MG/DL
LYMPHOCYTES # BLD AUTO: 3.5 K/UL
LYMPHOCYTES NFR BLD: 55.9 %
MCH RBC QN AUTO: 23 PG
MCHC RBC AUTO-ENTMCNC: 31.4 G/DL
MCV RBC AUTO: 73 FL
MONOCYTES # BLD AUTO: 0.5 K/UL
MONOCYTES NFR BLD: 7.8 %
NEUTROPHILS # BLD AUTO: 2.1 K/UL
NEUTROPHILS NFR BLD: 33.6 %
NONHDLC SERPL-MCNC: 171 MG/DL
PLATELET # BLD AUTO: 271 K/UL
PMV BLD AUTO: 9.6 FL
POTASSIUM SERPL-SCNC: 4 MMOL/L
PROT SERPL-MCNC: 8.4 G/DL
PTH-INTACT SERPL-MCNC: 190.5 PG/ML
RBC # BLD AUTO: 5.6 M/UL
SATURATED IRON: 10 %
SODIUM SERPL-SCNC: 140 MMOL/L
TOTAL IRON BINDING CAPACITY: 474 UG/DL
TRANSFERRIN SERPL-MCNC: 320 MG/DL
TRIGL SERPL-MCNC: 79 MG/DL
TSH SERPL DL<=0.005 MIU/L-ACNC: 0.63 UIU/ML
WBC # BLD AUTO: 6.28 K/UL

## 2018-08-15 PROCEDURE — 36415 COLL VENOUS BLD VENIPUNCTURE: CPT

## 2018-08-15 PROCEDURE — 3008F BODY MASS INDEX DOCD: CPT | Mod: CPTII,S$GLB,, | Performed by: INTERNAL MEDICINE

## 2018-08-15 PROCEDURE — 99999 PR PBB SHADOW E&M-EST. PATIENT-LVL IV: CPT | Mod: PBBFAC,,, | Performed by: INTERNAL MEDICINE

## 2018-08-15 PROCEDURE — 82728 ASSAY OF FERRITIN: CPT

## 2018-08-15 PROCEDURE — 86803 HEPATITIS C AB TEST: CPT

## 2018-08-15 PROCEDURE — 3077F SYST BP >= 140 MM HG: CPT | Mod: CPTII,S$GLB,, | Performed by: INTERNAL MEDICINE

## 2018-08-15 PROCEDURE — 85025 COMPLETE CBC W/AUTO DIFF WBC: CPT

## 2018-08-15 PROCEDURE — 84443 ASSAY THYROID STIM HORMONE: CPT

## 2018-08-15 PROCEDURE — 83036 HEMOGLOBIN GLYCOSYLATED A1C: CPT

## 2018-08-15 PROCEDURE — 80053 COMPREHEN METABOLIC PANEL: CPT

## 2018-08-15 PROCEDURE — 99214 OFFICE O/P EST MOD 30 MIN: CPT | Mod: S$GLB,,, | Performed by: INTERNAL MEDICINE

## 2018-08-15 PROCEDURE — 80061 LIPID PANEL: CPT

## 2018-08-15 PROCEDURE — 82306 VITAMIN D 25 HYDROXY: CPT

## 2018-08-15 PROCEDURE — 3079F DIAST BP 80-89 MM HG: CPT | Mod: CPTII,S$GLB,, | Performed by: INTERNAL MEDICINE

## 2018-08-15 PROCEDURE — 83540 ASSAY OF IRON: CPT

## 2018-08-15 PROCEDURE — 83970 ASSAY OF PARATHORMONE: CPT

## 2018-08-15 RX ORDER — HYDROXYZINE HYDROCHLORIDE 25 MG/1
25 TABLET, FILM COATED ORAL 3 TIMES DAILY PRN
Qty: 30 TABLET | Refills: 0 | Status: ON HOLD | OUTPATIENT
Start: 2018-08-15 | End: 2019-04-08 | Stop reason: CLARIF

## 2018-08-15 NOTE — PROGRESS NOTES
Subjective:       Patient ID: Pastora Cota is a 52 y.o. female.    Chief Complaint: Annual Exam and Anxiety    Here for annual exam    Twice in the past 4 months she has been sitting at work and trying to do too many things at once. She reports both times she became consciously stressed out and frustrated followed by blurry spots in her vision of right eye only. After onset of blurry vision follows palpitations, sensitivity to light, facial flushing, and palpitations. She closes her eyes for 15 minutes and symptoms mira. She denies associated HA, focal weakness, facial asymmetry, numbness/tingling, word finding difficulty, dizziness, loose stools, abdominal pain, tinnitus, dysphagia, aphasia, neck stiffness, F/C, vertigo. Hx of migraines x2 episodes while in college that involved visual disturbances with HA and photophobia. She is concerned they a re panic attacks. She does reports an increase in worry about her health as her parents age. Home BP readings 132/78. She denies any visual symptoms or symptoms concerning for overactive thyroid in between these episodes on pt with Hx of multinodular goiter. She was seen 01/2019 by endocrine for this and her hyperPTH.         Review of Systems   Constitutional: Negative for chills, fatigue, fever and unexpected weight change.   HENT: Negative for ear pain, hearing loss, postnasal drip, tinnitus, trouble swallowing and voice change.    Respiratory: Negative for cough, chest tightness, shortness of breath and wheezing.    Cardiovascular: Negative for chest pain, palpitations and leg swelling.   Gastrointestinal: Negative for abdominal pain, blood in stool, diarrhea, nausea and vomiting.   Endocrine: Negative for polydipsia, polyphagia and polyuria.   Genitourinary: Negative for difficulty urinating, dysuria, hematuria and vaginal bleeding.   Skin: Negative for rash.   Allergic/Immunologic: Negative for food allergies.   Neurological: Negative for dizziness,  "numbness and headaches.   Hematological: Does not bruise/bleed easily.   Psychiatric/Behavioral: The patient is not nervous/anxious.        Objective:      Vitals:    08/15/18 1348   BP: (!) 144/88   BP Location: Right arm   Patient Position: Sitting   BP Method: Medium (Manual)   Pulse: 100   SpO2: 97%   Weight: 76.7 kg (169 lb 1.5 oz)   Height: 5' 5" (1.651 m)      Physical Exam   Constitutional: She is oriented to person, place, and time. She appears well-developed and well-nourished. No distress.   HENT:   Head: Normocephalic and atraumatic.   Mouth/Throat: Oropharynx is clear and moist. No oropharyngeal exudate.   Eyes: Conjunctivae and EOM are normal. Pupils are equal, round, and reactive to light. No scleral icterus.   Neck: Carotid bruit is not present. No thyromegaly present.   Cardiovascular: Normal rate, regular rhythm and normal heart sounds.   No murmur heard.  Pulmonary/Chest: Effort normal and breath sounds normal. She has no wheezes. She has no rales.   Abdominal: Soft. She exhibits no distension. There is no tenderness.   Musculoskeletal: She exhibits no edema or tenderness.   Lymphadenopathy:     She has no cervical adenopathy.   Neurological: She is alert and oriented to person, place, and time. She has normal strength. No cranial nerve deficit or sensory deficit. Coordination and gait normal.   Skin: Skin is warm and dry.   Psychiatric: She has a normal mood and affect. Her behavior is normal.       Assessment:       1. Annual physical exam    2. Hyperlipidemia, unspecified hyperlipidemia type    3. Hypercalcemia    4. Need for hepatitis C screening test    5. Anemia, unspecified type    6. Blurry vision, right eye    7. Palpitations        Plan:       Pastora was seen today for annual exam and anxiety.    Diagnoses and all orders for this visit:    Annual physical exam  -     TSH; Future  -     CBC auto differential; Future  -     Comprehensive metabolic panel; Future  -     Lipid panel; " Future  -     Vitamin D; Future  -     Hemoglobin A1c; Future    Hyperlipidemia, unspecified hyperlipidemia type    Hypercalcemia  -     PTH, intact; Future    Need for hepatitis C screening test  -     Hepatitis C antibody; Future    Anemia, unspecified type  -     Iron and TIBC; Future  -     Ferritin; Future    Blurry vision, right eye  Preceded by conscious stress but asymmetrical visual disturbance seems hillary likely a phenomenon of hyperventilation   -aura without migraine is possible  No bruit. Does not sound like plaque showers     Anxiety  -     hydrOXYzine HCl (ATARAX) 25 MG tablet; Take 1 tablet (25 mg total) by mouth 3 (three) times daily as needed for Itching.  RTC in 4-6 weeks           Side effects of medication(s) were discussed in detail and patient voiced understanding.  Patient will call back for any issues or complications.

## 2018-08-16 LAB — HCV AB SERPL QL IA: NEGATIVE

## 2018-08-29 RX ORDER — ASPIRIN 325 MG
50000 TABLET, DELAYED RELEASE (ENTERIC COATED) ORAL WEEKLY
Qty: 12 CAPSULE | Refills: 3 | Status: SHIPPED | OUTPATIENT
Start: 2018-08-29 | End: 2019-07-05 | Stop reason: SDUPTHER

## 2018-08-30 ENCOUNTER — TELEPHONE (OUTPATIENT)
Dept: INTERNAL MEDICINE | Facility: CLINIC | Age: 53
End: 2018-08-30

## 2018-08-30 NOTE — TELEPHONE ENCOUNTER
----- Message from Belinda Alcaraz sent at 8/30/2018 11:11 AM CDT -----  Contact: pt   Name of Who is Calling:SID GARCIA [[id number]]     What is the request in detail: Patient states she is returning a call.....Please contact to further discuss and advise      Can the clinic reply by [facility]: No    [facility] [phone].

## 2018-08-30 NOTE — TELEPHONE ENCOUNTER
Called pt and informed her I left a message regarding her mother and stated that Ms. Tati Gallegos and that she need to come in for an bp check with the nurse since her bp have been high. MS. Cota stated that her mother was probably just stress out from the car breaking down yesterday. I informed her that her mother bp is very high and with it being that high it need to be check per provider advise she stated that she will bring her mother in tomorrow      Ms. Gallegos have been added to nurse schedule tomorrow

## 2018-08-30 NOTE — TELEPHONE ENCOUNTER
----- Message from Belinda Alcaraz sent at 8/30/2018 11:11 AM CDT -----  Contact: pt   Name of Who is Calling:SID GARCIA [[id number]]     What is the request in detail: Patient states she is returning a call.....Please contact to further discuss and advise      Can the clinic reply by [facility]: No    [facility] [phone].

## 2018-10-10 ENCOUNTER — PATIENT MESSAGE (OUTPATIENT)
Dept: OBSTETRICS AND GYNECOLOGY | Facility: CLINIC | Age: 53
End: 2018-10-10

## 2018-10-10 ENCOUNTER — TELEPHONE (OUTPATIENT)
Dept: OBSTETRICS AND GYNECOLOGY | Facility: CLINIC | Age: 53
End: 2018-10-10

## 2018-10-10 DIAGNOSIS — Z12.31 ENCOUNTER FOR SCREENING MAMMOGRAM FOR MALIGNANT NEOPLASM OF BREAST: Primary | ICD-10-CM

## 2018-10-26 ENCOUNTER — LAB VISIT (OUTPATIENT)
Dept: LAB | Facility: HOSPITAL | Age: 53
End: 2018-10-26
Attending: FAMILY MEDICINE
Payer: COMMERCIAL

## 2018-10-26 ENCOUNTER — OFFICE VISIT (OUTPATIENT)
Dept: INTERNAL MEDICINE | Facility: CLINIC | Age: 53
End: 2018-10-26
Attending: FAMILY MEDICINE
Payer: COMMERCIAL

## 2018-10-26 VITALS
DIASTOLIC BLOOD PRESSURE: 92 MMHG | HEART RATE: 81 BPM | WEIGHT: 170.63 LBS | BODY MASS INDEX: 28.43 KG/M2 | SYSTOLIC BLOOD PRESSURE: 144 MMHG | OXYGEN SATURATION: 99 % | HEIGHT: 65 IN

## 2018-10-26 DIAGNOSIS — M54.50 ACUTE LEFT-SIDED LOW BACK PAIN WITHOUT SCIATICA: Primary | ICD-10-CM

## 2018-10-26 DIAGNOSIS — M54.50 ACUTE LEFT-SIDED LOW BACK PAIN WITHOUT SCIATICA: ICD-10-CM

## 2018-10-26 DIAGNOSIS — I10 HYPERTENSION, UNSPECIFIED TYPE: ICD-10-CM

## 2018-10-26 LAB
BILIRUB UR QL STRIP: NEGATIVE
CLARITY UR REFRACT.AUTO: CLEAR
COLOR UR AUTO: NORMAL
GLUCOSE UR QL STRIP: NEGATIVE
HGB UR QL STRIP: NEGATIVE
KETONES UR QL STRIP: NEGATIVE
LEUKOCYTE ESTERASE UR QL STRIP: NEGATIVE
NITRITE UR QL STRIP: NEGATIVE
PH UR STRIP: 7 [PH] (ref 5–8)
PROT UR QL STRIP: NEGATIVE
SP GR UR STRIP: 1.01 (ref 1–1.03)
URN SPEC COLLECT METH UR: NORMAL

## 2018-10-26 PROCEDURE — 99999 PR PBB SHADOW E&M-EST. PATIENT-LVL III: CPT | Mod: PBBFAC,,, | Performed by: FAMILY MEDICINE

## 2018-10-26 PROCEDURE — 81003 URINALYSIS AUTO W/O SCOPE: CPT

## 2018-10-26 PROCEDURE — 3008F BODY MASS INDEX DOCD: CPT | Mod: CPTII,S$GLB,, | Performed by: FAMILY MEDICINE

## 2018-10-26 PROCEDURE — 3077F SYST BP >= 140 MM HG: CPT | Mod: CPTII,S$GLB,, | Performed by: FAMILY MEDICINE

## 2018-10-26 PROCEDURE — 99214 OFFICE O/P EST MOD 30 MIN: CPT | Mod: S$GLB,,, | Performed by: FAMILY MEDICINE

## 2018-10-26 PROCEDURE — 87086 URINE CULTURE/COLONY COUNT: CPT

## 2018-10-26 PROCEDURE — 3080F DIAST BP >= 90 MM HG: CPT | Mod: CPTII,S$GLB,, | Performed by: FAMILY MEDICINE

## 2018-10-26 RX ORDER — KETOROLAC TROMETHAMINE 10 MG/1
10 TABLET, FILM COATED ORAL 3 TIMES DAILY PRN
Qty: 30 TABLET | Refills: 0 | Status: SHIPPED | OUTPATIENT
Start: 2018-10-26 | End: 2018-11-30

## 2018-10-26 RX ORDER — CIPROFLOXACIN 500 MG/1
500 TABLET ORAL EVERY 12 HOURS
Qty: 14 TABLET | Refills: 0 | Status: SHIPPED | OUTPATIENT
Start: 2018-10-26 | End: 2018-11-30

## 2018-10-26 NOTE — PROGRESS NOTES
"Subjective:      Patient ID: Pastora Cota is a 52 y.o. female.    Chief Complaint: Flank Pain    HPI   2-3 days left low back pain, that feels like a ache pain, it is there all day everyday, not as severe as previous episode of pyelonephritis 6 months ago. She is not concerned for STDs and is sexually active. No stomach pain or nausea or vomiting. She does notice her bladder feels more full. No constipation. No blood or black tarry stools.  No travel or camping prior to onset this week. She does drink at lot of caffeineted tea. No heavy lifting, no trauma or falls prior to onset.         Review of Systems   Constitutional: Negative for activity change and unexpected weight change.   HENT: Negative for hearing loss, rhinorrhea and trouble swallowing.    Eyes: Negative for discharge and visual disturbance.   Respiratory: Negative for chest tightness and wheezing.    Cardiovascular: Negative for chest pain and palpitations.   Gastrointestinal: Negative for blood in stool, constipation, diarrhea and vomiting.   Endocrine: Negative for polydipsia and polyuria.   Genitourinary: Negative for difficulty urinating, dysuria and hematuria.   Neurological: Negative for weakness and headaches.   Psychiatric/Behavioral: Negative for dysphoric mood.     I personally reviewed Past Medical History, Past Surgical history,  Past Social History and Family History      Objective:   BP (!) 144/92   Pulse 81   Ht 5' 5" (1.651 m)   Wt 77.4 kg (170 lb 10.2 oz)   SpO2 99%   BMI 28.40 kg/m²     Physical Exam   Constitutional: She is oriented to person, place, and time. She appears well-developed and well-nourished. No distress.   HENT:   Head: Normocephalic and atraumatic.   Right Ear: Hearing, tympanic membrane, external ear and ear canal normal.   Left Ear: Hearing, tympanic membrane, external ear and ear canal normal.   Nose: Nose normal.   Mouth/Throat: Uvula is midline and oropharynx is clear and moist. No oropharyngeal " exudate.   Eyes: Conjunctivae and EOM are normal. Pupils are equal, round, and reactive to light. Right eye exhibits no discharge. Left eye exhibits no discharge. No scleral icterus.   Neck: Normal range of motion. Neck supple.   Cardiovascular: Normal rate, regular rhythm, normal heart sounds and intact distal pulses. Exam reveals no gallop.   No murmur heard.  Pulmonary/Chest: Effort normal and breath sounds normal. No respiratory distress. She has no wheezes. She has no rales. She exhibits no tenderness.   Abdominal: Soft. Bowel sounds are normal. She exhibits no distension and no mass. There is no tenderness. There is no rigidity, no rebound, no guarding and no CVA tenderness.   Musculoskeletal:        Thoracic back: Normal.        Lumbar back: Normal.   Neurological: She is alert and oriented to person, place, and time.   Skin: Skin is warm and dry.   Vitals reviewed.      1. Acute left-sided low back pain without sciatica    2. Hypertension, unspecified type        1. Will follow up Ucx, call with any worsening, stop tea, increase water, avoid heavy lifting   2. Uncontrolled, bp recheck with nurse one week     Orders Placed This Encounter   Procedures    Urine culture    Urinalysis     Medications Ordered This Encounter   Medications    ciprofloxacin HCl (CIPRO) 500 MG tablet     Sig: Take 1 tablet (500 mg total) by mouth every 12 (twelve) hours.     Dispense:  14 tablet     Refill:  0    ketorolac (TORADOL) 10 mg tablet     Sig: Take 1 tablet (10 mg total) by mouth 3 (three) times daily as needed for Pain.     Dispense:  30 tablet     Refill:  0

## 2018-10-26 NOTE — PATIENT INSTRUCTIONS
"  Kidney Infection (Adult, Female)    An infection in one or both kidneys is called "pyelonephritis." It usually happens when bacteria (or rarely, viruses, fungi, or other disease-causing organisms) get into the kidney. The bacteria (or other disease-causing organisms) can enter the kidneys from the bladder or blood traveling from other parts of the body. A kidney infection can become serious. It can cause severe illness, scarring of the kidneys, or kidney failure if not treated properly.  Common causes for this problem include:  · Not keeping the genital area clean and dry, which promotes the growth of bacteria  · Wiping back to front which drags bacteria from the rectum toward the urinary opening (urethra)  · Wearing tight pants or underwear (this lets moisture build up in the genital area, which helps bacteria grow)  · Holding urine in for long periods of time  · Dehydration  Kidney infections can cause symptoms similar to a bladder infection. Symptoms include:  · Pain (or burning) when urinating  · Having to urinate more often than usual  · Blood in the urine (pink or red)  · Abdominal pain or discomfort, usually in the lower abdomen  · Pain in the side or back  · Pain above the pubic bone  · Fever or chills  · Vomiting  · Loss of appetite  Treatment is oral antibiotics, or in more severe cases, intramuscular or IV antibiotics. These are started right away and may be changed once urine culture results determine the infecting organisms. Treatment helps prevent a more serious kidney infection.  Medicines  Medicines can help in the treatment of a bladder infection:  · Take antibiotics until they are used up, even if you feel better. It is important to finish them to make sure the infection is gone.  · Unless another medicine was prescribed, you can use over-the-counter medicines for pain, fever, or discomfort. If you have chronic liver of kidney disease, talk with your healthcare provider before using these " medicines. Also talk with your provider if you've ever had a stomach ulcer or gastrointestinal (GI) bleeding, or are taking blood thinners.  Home care  The following are general care guidelines:  · Stay home from work or school. Rest in bed until your fever breaks and you are feeling better, or as advised by your healthcare provider.  · Drink lots of fluid unless you must restrict fluids for other medical reasons. This will force the medicine into your urinary system and flush the bacteria out of your body. Ask your healthcare provider how much you should drink.  · Avoid sex until you have finished all of your medicine and your symptoms are gone.  · Avoid caffeine, alcohol, and spicy foods. These foods may irritate the kidneys and bladder.  · Avoid taking bubble baths. Sensitivity to the chemicals in bubble baths can irritate the urethra.  · Make sure you wipe from front to back after using the toilet.  · Wear loose cloths and cotton underwear.  Prevention  These self-care steps can help prevent future infections:  · Drink plenty of fluids to prevent dehydration and flush out the bladder. Do this unless you must restrict fluids for other health reasons, or your healthcare provider told you not to.  · Proper cleaning after going to the bathroom in important. Make sure you wipe from front to back after using the toilet.  · Urinate more often. Don't try to hold urine in for a long time.  · Avoid tight-fitting pants and underwear.  · Improve your diet to prevent constipation. Eat more fruits, vegetables, and fiber. Eat less junk and fatty foods. Constipation can make a urinary tract infection more likely. Talk with your healthcare provider if you have trouble with bowel movements.  · Urinate right after intercourse to flush out the bladder.  Follow-up care  Follow up with your healthcare provider, or as advised. Additional testing may be needed to make sure the infection has cleared. Close follow-up and further testing  is very important to find the cause and to prevent future infections.  If a urine culture was done, you will be contacted if your treatment needs to be changed. If directed, you may call to find out the results.  If you had an X-ray, CT scan, or other diagnostic test, you will be notified of any new findings that may affect your care.  Call 911  Call 911 if any of the following occur:  · Trouble breathing  · Fainting or loss of consciousness  · Rapid or very slow heart rate  · Weakness, dizziness, or fainting  · Difficulty arousing or confusion  When to seek medical advice  Call your healthcare provider right away if any of these occur:  · Fever 100.4°F (38°C) or higher after 48 hours of treatment, or as advised by your healthcare provider  · Not feeling better within 1 to 2 days after starting antibiotics  · Any symptom that continues after 3 days of treatment  · Increasing pain in the stomach, back, side, or groin area  · Repeated vomiting  · Not able to take prescribed medicine due to nausea or another reason  · Bloody, dark-colored, or foul smelling urine  · Trouble urinating or decreased urine output  · No urine for 8 hours, no tears when crying, sunken eyes, or dry mouth  Date Last Reviewed: 10/1/2016  © 5097-3893 [employer]. [address] [place] [address], [place] [address]. All rights reserved. This information is not intended as a substitute for professional medical care. Always follow your healthcare professional's instructions.

## 2018-10-28 LAB — BACTERIA UR CULT: NORMAL

## 2018-11-06 ENCOUNTER — HOSPITAL ENCOUNTER (OUTPATIENT)
Dept: RADIOLOGY | Facility: OTHER | Age: 53
Discharge: HOME | End: 2018-11-06
Attending: OBSTETRICS & GYNECOLOGY
Payer: COMMERCIAL

## 2018-11-06 DIAGNOSIS — Z12.31 ENCOUNTER FOR SCREENING MAMMOGRAM FOR MALIGNANT NEOPLASM OF BREAST: ICD-10-CM

## 2018-11-06 PROCEDURE — 77063 BREAST TOMOSYNTHESIS BI: CPT | Mod: 26,,, | Performed by: INTERNAL MEDICINE

## 2018-11-06 PROCEDURE — 77063 BREAST TOMOSYNTHESIS BI: CPT | Mod: TC

## 2018-11-06 PROCEDURE — 77067 SCR MAMMO BI INCL CAD: CPT | Mod: 26,,, | Performed by: INTERNAL MEDICINE

## 2018-11-06 PROCEDURE — 77067 SCR MAMMO BI INCL CAD: CPT | Mod: TC

## 2018-11-15 DIAGNOSIS — I10 HYPERTENSION, UNSPECIFIED TYPE: Primary | ICD-10-CM

## 2018-11-15 RX ORDER — FUROSEMIDE 20 MG/1
20 TABLET ORAL DAILY
Qty: 90 TABLET | Refills: 1 | Status: SHIPPED | OUTPATIENT
Start: 2018-11-15 | End: 2019-05-20 | Stop reason: SDUPTHER

## 2018-11-15 RX ORDER — AMLODIPINE BESYLATE 10 MG/1
10 TABLET ORAL DAILY
Qty: 90 TABLET | Refills: 1 | Status: SHIPPED | OUTPATIENT
Start: 2018-11-15 | End: 2019-05-20 | Stop reason: SDUPTHER

## 2018-11-30 ENCOUNTER — OFFICE VISIT (OUTPATIENT)
Dept: OBSTETRICS AND GYNECOLOGY | Facility: CLINIC | Age: 53
End: 2018-11-30
Attending: OBSTETRICS & GYNECOLOGY
Payer: COMMERCIAL

## 2018-11-30 VITALS
SYSTOLIC BLOOD PRESSURE: 162 MMHG | DIASTOLIC BLOOD PRESSURE: 96 MMHG | BODY MASS INDEX: 28.95 KG/M2 | WEIGHT: 169.56 LBS | HEIGHT: 64 IN

## 2018-11-30 DIAGNOSIS — Z01.419 ENCOUNTER FOR GYNECOLOGICAL EXAMINATION WITHOUT ABNORMAL FINDING: Primary | ICD-10-CM

## 2018-11-30 PROCEDURE — 3077F SYST BP >= 140 MM HG: CPT | Mod: CPTII,S$GLB,, | Performed by: OBSTETRICS & GYNECOLOGY

## 2018-11-30 PROCEDURE — 99396 PREV VISIT EST AGE 40-64: CPT | Mod: S$GLB,,, | Performed by: OBSTETRICS & GYNECOLOGY

## 2018-11-30 PROCEDURE — 3080F DIAST BP >= 90 MM HG: CPT | Mod: CPTII,S$GLB,, | Performed by: OBSTETRICS & GYNECOLOGY

## 2018-11-30 NOTE — PROGRESS NOTES
SUBJECTIVE:   53 y.o. female   for annual routine  checkup. No LMP recorded. Patient has had a hysterectomy..  She has no unusual complaints.        Past Medical History:   Diagnosis Date    Abnormal Pap smear     repeat normal    Abnormal Pap smear of cervix     Abnormal Pap smear of vagina     Allergy     seasonal    AR (allergic rhinitis)     Hypercalcemia 9/10/2015    Hypertension     IGT (impaired glucose tolerance) 2014    Pneumonia 2016     Past Surgical History:   Procedure Laterality Date    ABDOMINAL SURGERY      CHOLECYSTECTOMY          COLONOSCOPY N/A 2016    Procedure: COLONOSCOPY;  Surgeon: Johnny Clayton MD;  Location: [facility] (4TH FLR);  Service: Endoscopy;  Laterality: N/A;    COLONOSCOPY N/A 2017    Procedure: COLONOSCOPY;  Surgeon: Johnny Clayton MD;  Location: [facility] ([facility] FLR);  Service: Endoscopy;  Laterality: N/A;    COLONOSCOPY N/A 2017    Performed by Johnny Clayton MD at [facility] (4TH FLR)    COLONOSCOPY N/A 2016    Performed by Johnny Clayton MD at [facility] (4TH FLR)    COLPOSCOPY      DILATION AND CURETTAGE OF UTERUS      DRAINAGE N/A 2016    Performed by [facility] CATH LAB    DRAINAGE N/A 2016    Performed [facility] C[facility]    HYSTERECTOMY      2007 tahbso     OOPHORECTOMY      THORACENTESIS N/A 2016    Performed by [facility] at [facility] CATH LAB     Social History     Socioeconomic History    Marital status:      Spouse name: Not on file    Number of children: Not on file    Years of education: Not on file    Highest education level: Not on file   Social Needs    Financial resource strain: Not on file    Food insecurity - worry: Not on file    Food insecurity - inability: Not on file    Transportation needs - medical: Not on file    Transportation needs - non-medical: Not on file   Occupational History    Occupation:       Employer: [employer]    Tobacco Use    Smoking status: Never Smoker    Smokeless tobacco: Never Used   Substance and Sexual Activity    Alcohol use: Yes     Alcohol/week: 0.0 oz     Types: 1 Standard drinks or equivalent per week     Comment: socailly    Drug use: No    Sexual activity: Yes     Partners: Male     Birth control/protection: None, See Surgical Hx     Comment: ; hysterectomy   Other Topics Concern    Are you pregnant or think you may be? Not Asked    Breast-feeding Not Asked   Social History Narrative    No regular exercise     with 1 child     Family History   Problem Relation Age of Onset    Hypertension Mother     Diabetes Mother     Glaucoma Mother     Hypertension Sister         four sisters    Breast cancer Sister 44    Glaucoma Father     No Known Problems Brother     No Known Problems Maternal Aunt     No Known Problems Maternal Uncle     No Known Problems Paternal Aunt     No Known Problems Paternal Uncle     No Known Problems Maternal Grandmother     No Known Problems Maternal Grandfather     No Known Problems Paternal Grandmother     No Known Problems Paternal Grandfather     Thyroid cancer Neg Hx     Cancer Neg Hx     Colon cancer Neg Hx     Ovarian cancer Neg Hx     Melanoma Neg Hx     Psoriasis Neg Hx     Lupus Neg Hx     Amblyopia Neg Hx     Blindness Neg Hx     Cataracts Neg Hx     Macular degeneration Neg Hx     Retinal detachment Neg Hx     Strabismus Neg Hx     Stroke Neg Hx     Thyroid disease Neg Hx      OB History    Para Term  AB Living   3 2 2   1     SAB TAB Ectopic Multiple Live Births   1              # Outcome Date GA Lbr Leonardo/2nd Weight Sex Delivery Anes PTL Lv   3 Term            2 Term            1 SAB                       Current Outpatient Medications   Medication Sig Dispense Refill    amLODIPine (NORVASC) 10 MG tablet Take 1 tablet (10 mg total) by mouth once daily. 90 tablet 1     cetirizine (ZYRTEC) 10 MG tablet Take 10 mg by mouth once daily.      cholecalciferol, vitamin D3, 50,000 unit capsule Take 1 capsule (50,000 Units total) by mouth once a week. 12 capsule 3    furosemide (LASIX) 20 MG tablet Take 1 tablet (20 mg total) by mouth once daily. 90 tablet 1    hydrocortisone butyrate 0.1 % Crea cream APPLY  CREAM TO AFFECTED AREA OF FACE TWICE DAILY (Patient taking differently: APPLY  CREAM TO AFFECTED AREA OF FACE TWICE DAILY as needed) 45 g 3    hydrOXYzine HCl (ATARAX) 25 MG tablet Take 1 tablet (25 mg total) by mouth 3 (three) times daily as needed for Itching. 30 tablet 0    triamcinolone acetonide 0.1% (KENALOG) 0.1 % cream AAA bid (Patient taking differently: Apply topically. Apply to chest and back as needed) 454 g 3    epinephrine (EPIPEN) 0.3 mg/0.3 mL AtIn Inject 0.3 mLs (0.3 mg total) into the muscle as needed (extreme swelling, difficulty swallowing, voice changes or trouble breathing). 2 Device 0     No current facility-administered medications for this visit.      Allergies: Sulfa (sulfonamide antibiotics)     The 10-year ASCVD risk score (Se DC Jr., et al., 2013) is: 14.3%    Values used to calculate the score:      Age: 53 years      Sex: Female      Is Non- : Yes      Diabetic: No      Tobacco smoker: No      Systolic Blood Pressure: 162 mmHg      Is BP treated: Yes      HDL Cholesterol: 44 mg/dL      Total Cholesterol: 215 mg/dL      ROS:  Constitutional: no weight loss, weight gain, fever, fatigue  Eyes:  No vision changes, glasses/contacts  ENT/Mouth: No ulcers, sinus problems, ears ringing, headache  Cardiovascular: No inability to lie flat, chest pain, exercise intolerance, swelling, heart palpitations  Respiratory: No wheezing, coughing blood, shortness of breath, or cough  Gastrointestinal: No diarrhea, bloody stool, nausea/vomiting, constipation, gas, hemorrhoids  Genitourinary: No blood in urine, painful urination, urgency of  urination, frequency of urination, incomplete emptying, incontinence, abnormal bleeding, painful periods, heavy periods, vaginal discharge, vaginal odor, painful intercourse, sexual problems, bleeding after intercourse.  Musculoskeletal: No muscle weakness  Skin/Breast: No painful breasts, nipple discharge, masses, rash, ulcers  Neurological: No passing out, seizures, numbness, headache  Endocrine: No diabetes, hypothyroid, hyperthyroid, hot flashes, hair loss, abnormal hair growth, acne  Psychiatric: No depression, crying  Hematologic: No bruises, bleeding, swollen lymph nodes, anemia.      Physical Exam:   Constitutional: She is oriented to person, place, and time. She appears well-developed and well-nourished.      Neck: Normal range of motion. No tracheal deviation present. No thyromegaly present.    Cardiovascular: Exam reveals no edema.     Pulmonary/Chest: Effort normal. She exhibits no mass, no tenderness, no deformity and no retraction. Right breast exhibits no inverted nipple, no mass, no nipple discharge, no skin change, no tenderness, presence, no bleeding and no swelling. Left breast exhibits no inverted nipple, no mass, no nipple discharge, no skin change, no tenderness, presence, no bleeding and no swelling. Breasts are symmetrical.        Abdominal: Soft. She exhibits no distension and no mass. There is no tenderness. There is no rebound and no guarding. No hernia. Hernia confirmed negative in the left inguinal area.     Genitourinary: Rectal exam shows no external hemorrhoid. There is no rash, tenderness or lesion on the right labia. There is no rash, tenderness or lesion on the left labia. Uterus is absent. No no adexnal prolapse. Right adnexum displays no mass, no tenderness and no fullness. Left adnexum displays no mass, no tenderness and no fullness. No tenderness, bleeding, rectocele, cystocele or unspecified prolapse of vaginal walls in the vagina. No vaginal discharge found. Vaginal cuff  normal.Cervix exhibits absence.           Musculoskeletal: Normal range of motion and moves all extremeties. She exhibits no edema.      Lymphadenopathy:        Right: No inguinal adenopathy present.        Left: No inguinal adenopathy present.    Neurological: She is alert and oriented to person, place, and time.    Skin: No rash noted. No erythema. No pallor.    Psychiatric: She has a normal mood and affect. Her behavior is normal. Judgment and thought content normal.         ASSESSMENT:   well woman    PLAN:   Mammogram  Patient is going to contact her PCP regarding her elevated BP  return annually or prn

## 2018-12-03 ENCOUNTER — OFFICE VISIT (OUTPATIENT)
Dept: INTERNAL MEDICINE | Facility: CLINIC | Age: 53
End: 2018-12-03
Attending: INTERNAL MEDICINE
Payer: COMMERCIAL

## 2018-12-03 VITALS
DIASTOLIC BLOOD PRESSURE: 96 MMHG | OXYGEN SATURATION: 97 % | BODY MASS INDEX: 29.13 KG/M2 | SYSTOLIC BLOOD PRESSURE: 168 MMHG | HEIGHT: 64 IN | WEIGHT: 170.63 LBS | HEART RATE: 99 BPM

## 2018-12-03 DIAGNOSIS — I99.8 ASYMMETRIC BLOOD PRESSURES: Primary | ICD-10-CM

## 2018-12-03 DIAGNOSIS — I10 HYPERTENSION, UNSPECIFIED TYPE: ICD-10-CM

## 2018-12-03 PROCEDURE — 3077F SYST BP >= 140 MM HG: CPT | Mod: CPTII,S$GLB,, | Performed by: INTERNAL MEDICINE

## 2018-12-03 PROCEDURE — 3008F BODY MASS INDEX DOCD: CPT | Mod: CPTII,S$GLB,, | Performed by: INTERNAL MEDICINE

## 2018-12-03 PROCEDURE — 3080F DIAST BP >= 90 MM HG: CPT | Mod: CPTII,S$GLB,, | Performed by: INTERNAL MEDICINE

## 2018-12-03 PROCEDURE — 99214 OFFICE O/P EST MOD 30 MIN: CPT | Mod: S$GLB,,, | Performed by: INTERNAL MEDICINE

## 2018-12-03 PROCEDURE — 99999 PR PBB SHADOW E&M-EST. PATIENT-LVL III: CPT | Mod: PBBFAC,,, | Performed by: INTERNAL MEDICINE

## 2018-12-03 RX ORDER — IRBESARTAN 75 MG/1
75 TABLET ORAL NIGHTLY
Qty: 90 TABLET | Refills: 3 | Status: SHIPPED | OUTPATIENT
Start: 2018-12-03 | End: 2019-03-14

## 2018-12-03 NOTE — PROGRESS NOTES
"Subjective:       Patient ID: Pastora Cota is a 53 y.o. female.    Chief Complaint: Hypertension    Here for urgent visit    Home BP has been elevated with home BP cuff consistently [id number]. Denies frequent or current HA, intermittent blurry vision, dizziness, CP, or SOB.         Review of Systems    Objective:      Vitals:    12/03/18 1553   BP: (!) 168/96   Pulse: 99   SpO2: 97%   Weight: 77.4 kg (170 lb 10.2 oz)   Height: 5' 4" (1.626 m)      Physical Exam   Constitutional: She is oriented to person, place, and time. She appears well-developed and well-nourished. No distress.   HENT:   Head: Normocephalic and atraumatic.   Mouth/Throat: Oropharynx is clear and moist. No oropharyngeal exudate.   Eyes: Conjunctivae and EOM are normal. Pupils are equal, round, and reactive to light. No scleral icterus.   Neck: No thyromegaly present.   Cardiovascular: Normal rate, regular rhythm and normal heart sounds.   No murmur heard.  Pulmonary/Chest: Effort normal and breath sounds normal. She has no wheezes. She has no rales.   Abdominal: Soft. She exhibits no distension. There is no tenderness.   Musculoskeletal: She exhibits no edema or tenderness.   Lymphadenopathy:     She has no cervical adenopathy.   Neurological: She is alert and oriented to person, place, and time.   Skin: Skin is warm and dry.   Psychiatric: She has a normal mood and affect. Her behavior is normal.       Assessment:       1. Asymmetric blood pressures    2. Hypertension, unspecified type        Plan:       Pastora was seen today for hypertension.    Diagnoses and all orders for this visit:    Asymmetric blood pressures  -     Transthoracic echo (TTE) complete (Cupid Only); Future    Hypertension, unspecified type  -    START irbesartan (AVAPRO) 75 MG tablet; Take 1 tablet (75 mg total) by mouth every evening.   f/u in 3-4 weeks for nurse visit for BP check               Side effects of medication(s) were discussed in detail " and patient voiced understanding.  Patient will call back for any issues or complications.

## 2018-12-10 ENCOUNTER — HOSPITAL ENCOUNTER (OUTPATIENT)
Dept: CARDIOLOGY | Facility: OTHER | Age: 53
Discharge: HOME | End: 2018-12-10
Attending: INTERNAL MEDICINE
Payer: COMMERCIAL

## 2018-12-10 VITALS
HEIGHT: 64 IN | WEIGHT: 170 LBS | SYSTOLIC BLOOD PRESSURE: 168 MMHG | DIASTOLIC BLOOD PRESSURE: 96 MMHG | BODY MASS INDEX: 29.02 KG/M2 | HEART RATE: 99 BPM

## 2018-12-10 DIAGNOSIS — I99.8 ASYMMETRIC BLOOD PRESSURES: ICD-10-CM

## 2018-12-10 PROCEDURE — 93306 TTE W/DOPPLER COMPLETE: CPT | Mod: 26,,, | Performed by: INTERNAL MEDICINE

## 2018-12-10 PROCEDURE — 93306 TTE W/DOPPLER COMPLETE: CPT

## 2018-12-11 LAB
AORTIC ROOT ANNULUS: 2.33 CM
AORTIC VALVE CUSP SEPERATION: 1.64 CM
ASCENDING AORTA: 2.8 CM
AV INDEX (PROSTH): 0.91
AV MEAN GRADIENT: 4.68 MMHG
AV PEAK GRADIENT: 7.4 MMHG
AV VALVE AREA: 2.66 CM2
BSA FOR ECHO PROCEDURE: 1.87 M2
CV ECHO LV RWT: 0.51 CM
DOP CALC AO PEAK VEL: 1.36 M/S
DOP CALC AO VTI: 26.13 CM
DOP CALC LVOT AREA: 2.92 CM2
DOP CALC LVOT DIAMETER: 1.93 CM
DOP CALC LVOT STROKE VOLUME: 69.56 CM3
DOP CALCLVOT PEAK VEL VTI: 23.79 CM
E WAVE DECELERATION TIME: 190.42 MSEC
E/A RATIO: 0.97
E/E' RATIO: 8.75
ECHO LV POSTERIOR WALL: 0.91 CM (ref 0.6–1.1)
FRACTIONAL SHORTENING: 36 % (ref 28–44)
INTERVENTRICULAR SEPTUM: 1.05 CM (ref 0.6–1.1)
IVRT: 0.08 MSEC
LA MAJOR: 4.99 CM
LA MINOR: 4.76 CM
LA WIDTH: 4.17 CM
LEFT ATRIUM SIZE: 2.98 CM
LEFT ATRIUM VOLUME INDEX: 28.2 ML/M2
LEFT ATRIUM VOLUME: 51.46 CM3
LEFT INTERNAL DIMENSION IN SYSTOLE: 2.29 CM (ref 2.1–4)
LEFT VENTRICLE DIASTOLIC VOLUME INDEX: 29.56 ML/M2
LEFT VENTRICLE DIASTOLIC VOLUME: 53.96 ML
LEFT VENTRICLE MASS INDEX: 57.2 G/M2
LEFT VENTRICLE SYSTOLIC VOLUME INDEX: 9.9 ML/M2
LEFT VENTRICLE SYSTOLIC VOLUME: 18.01 ML
LEFT VENTRICULAR INTERNAL DIMENSION IN DIASTOLE: 3.59 CM (ref 3.5–6)
LEFT VENTRICULAR MASS: 104.34 G
LV LATERAL E/E' RATIO: 7
LV SEPTAL E/E' RATIO: 11.67
MV PEAK A VEL: 0.72 M/S
MV PEAK E VEL: 0.7 M/S
PISA TR MAX VEL: 2.03 M/S
PULM VEIN S/D RATIO: 1.39
PV PEAK D VEL: 0.38 M/S
PV PEAK S VEL: 0.53 M/S
PV PEAK VELOCITY: 1.07 CM/S
RA MAJOR: 4.39 CM
RA WIDTH: 2.82 CM
RIGHT VENTRICULAR END-DIASTOLIC DIMENSION: 2.59 CM
RV TISSUE DOPPLER FREE WALL SYSTOLIC VELOCITY 1 (APICAL 4 CHAMBER VIEW): 21.95 M/S
SINUS: 2.52 CM
STJ: 2.33 CM
TDI LATERAL: 0.1
TDI SEPTAL: 0.06
TDI: 0.08
TR MAX PG: 16.48 MMHG
TRICUSPID ANNULAR PLANE SYSTOLIC EXCURSION: 2.11 CM

## 2019-01-30 ENCOUNTER — PATIENT MESSAGE (OUTPATIENT)
Dept: ADMINISTRATIVE | Facility: HOSPITAL | Age: 54
End: 2019-01-30

## 2019-01-30 ENCOUNTER — PATIENT OUTREACH (OUTPATIENT)
Dept: ADMINISTRATIVE | Facility: HOSPITAL | Age: 54
End: 2019-01-30

## 2019-02-20 ENCOUNTER — TELEPHONE (OUTPATIENT)
Dept: INTERNAL MEDICINE | Facility: CLINIC | Age: 54
End: 2019-02-20

## 2019-02-20 NOTE — TELEPHONE ENCOUNTER
Spoke to MsTrinidad Cipriano to schedule an appt for nurse visit.  Confirmed date and time.  Patient states understanding with no further questions.

## 2019-02-20 NOTE — TELEPHONE ENCOUNTER
----- Message from Belinda Alcaraz sent at 2/20/2019  4:39 PM CST -----  Contact: pt   Name of Who is Calling: SID GARCIA [[id number]]    What is the request in detail: Patient is requesting a call back in regards to scheduling a nurse visit to check her pressure as well as medication.....Please contact to further discuss and advise      Can the clinic reply by [facility]: No     What Number to Call Back if not in [facility]: [phone].

## 2019-02-25 ENCOUNTER — TELEPHONE (OUTPATIENT)
Dept: INTERNAL MEDICINE | Facility: CLINIC | Age: 54
End: 2019-02-25

## 2019-02-25 ENCOUNTER — CLINICAL SUPPORT (OUTPATIENT)
Dept: INTERNAL MEDICINE | Facility: CLINIC | Age: 54
End: 2019-02-25
Payer: COMMERCIAL

## 2019-02-25 VITALS — DIASTOLIC BLOOD PRESSURE: 92 MMHG | SYSTOLIC BLOOD PRESSURE: 148 MMHG | OXYGEN SATURATION: 98 % | HEART RATE: 89 BPM

## 2019-02-25 PROCEDURE — 99999 PR PBB SHADOW E&M-EST. PATIENT-LVL III: CPT | Mod: PBBFAC,,,

## 2019-02-25 PROCEDURE — 99999 PR PBB SHADOW E&M-EST. PATIENT-LVL III: ICD-10-PCS | Mod: PBBFAC,,,

## 2019-02-25 NOTE — PROGRESS NOTES
Pastora Cota 53 y.o. female is here today for Blood Pressure check.   History of HTN yes.    Review of patient's allergies indicates:   Allergen Reactions    Sulfa (sulfonamide antibiotics) Hives and Edema     Creatinine   Date Value Ref Range Status   08/15/2018 0.9 0.5 - 1.4 mg/dL Final     Sodium   Date Value Ref Range Status   08/15/2018 140 136 - 145 mmol/L Final     Potassium   Date Value Ref Range Status   08/15/2018 4.0 3.5 - 5.1 mmol/L Final   ]  Patient verifies taking blood pressure medications on a regular bases at the same time of the day.     Current Outpatient Medications:     amLODIPine (NORVASC) 10 MG tablet, Take 1 tablet (10 mg total) by mouth once daily., Disp: 90 tablet, Rfl: 1    furosemide (LASIX) 20 MG tablet, Take 1 tablet (20 mg total) by mouth once daily., Disp: 90 tablet, Rfl: 1    irbesartan (AVAPRO) 75 MG tablet, Take 1 tablet (75 mg total) by mouth every evening., Disp: 90 tablet, Rfl: 3    cetirizine (ZYRTEC) 10 MG tablet, Take 10 mg by mouth once daily., Disp: , Rfl:     cholecalciferol, vitamin D3, 50,000 unit capsule, Take 1 capsule (50,000 Units total) by mouth once a week., Disp: 12 capsule, Rfl: 3    epinephrine (EPIPEN) 0.3 mg/0.3 mL AtIn, Inject 0.3 mLs (0.3 mg total) into the muscle as needed (extreme swelling, difficulty swallowing, voice changes or trouble breathing)., Disp: 2 Device, Rfl: 0    hydrocortisone butyrate 0.1 % Crea cream, APPLY  CREAM TO AFFECTED AREA OF FACE TWICE DAILY (Patient taking differently: APPLY  CREAM TO AFFECTED AREA OF FACE TWICE DAILY as needed), Disp: 45 g, Rfl: 3    hydrOXYzine HCl (ATARAX) 25 MG tablet, Take 1 tablet (25 mg total) by mouth 3 (three) times daily as needed for Itching., Disp: 30 tablet, Rfl: 0    triamcinolone acetonide 0.1% (KENALOG) 0.1 % cream, AAA bid (Patient taking differently: Apply topically. Apply to chest and back as needed), Disp: 454 g, Rfl: 3  Does patient have record of home blood pressure  readings yes. Readings have been averaging 130s/70-80s, but takes her b/p in the morning before taking b/p meds (amlodipine and lasix).  Last dose of blood pressure medication was taken at 11:00 am.  Patient is asymptomatic. Pt denies h/a, dizziness, numbness and tingling of extremities, and sob/chest pain.      BP: (!) 155/90 RA her B/P automatic monitor, 150/90 LA manual, Pulse: 98.    Blood pressure reading after 15 minutes was 148/92, Pulse 89.  Dr. Koch notified.

## 2019-02-25 NOTE — Clinical Note
Does patient have record of home blood pressure readings yes. Readings have been averaging 130s/70-80s, but takes her b/p in the morning before taking b/p meds (amlodipine and lasix).Last dose of blood pressure medication was taken at 11:00 am.Patient is asymptomatic. Pt denies h/a, dizziness, numbness and tingling of extremities, and sob/chest pain.BP: (!) 155/90 RA her B/P automatic monitor, 150/90 LA manual, Pulse: 98.Blood pressure reading after 15 minutes was 148/92, Pulse 89.Dr. Koch notified.

## 2019-02-26 ENCOUNTER — TELEPHONE (OUTPATIENT)
Dept: INTERNAL MEDICINE | Facility: CLINIC | Age: 54
End: 2019-02-26

## 2019-03-07 ENCOUNTER — OFFICE VISIT (OUTPATIENT)
Dept: ENDOCRINOLOGY | Facility: CLINIC | Age: 54
End: 2019-03-07
Payer: COMMERCIAL

## 2019-03-07 ENCOUNTER — LAB VISIT (OUTPATIENT)
Dept: LAB | Facility: HOSPITAL | Age: 54
End: 2019-03-07
Attending: INTERNAL MEDICINE
Payer: COMMERCIAL

## 2019-03-07 VITALS
BODY MASS INDEX: 28.79 KG/M2 | WEIGHT: 168.63 LBS | DIASTOLIC BLOOD PRESSURE: 92 MMHG | SYSTOLIC BLOOD PRESSURE: 148 MMHG | HEIGHT: 64 IN | HEART RATE: 90 BPM

## 2019-03-07 DIAGNOSIS — E21.0 PRIMARY HYPERPARATHYROIDISM: Primary | ICD-10-CM

## 2019-03-07 DIAGNOSIS — M85.80 OSTEOPENIA, UNSPECIFIED LOCATION: ICD-10-CM

## 2019-03-07 DIAGNOSIS — E04.2 MULTINODULAR GOITER: ICD-10-CM

## 2019-03-07 DIAGNOSIS — E21.0 PRIMARY HYPERPARATHYROIDISM: ICD-10-CM

## 2019-03-07 DIAGNOSIS — I10 ESSENTIAL HYPERTENSION: ICD-10-CM

## 2019-03-07 DIAGNOSIS — R73.02 IGT (IMPAIRED GLUCOSE TOLERANCE): ICD-10-CM

## 2019-03-07 LAB
ALBUMIN SERPL BCP-MCNC: 4.1 G/DL
ALP SERPL-CCNC: 134 U/L
ALT SERPL W/O P-5'-P-CCNC: 19 U/L
ANION GAP SERPL CALC-SCNC: 5 MMOL/L
AST SERPL-CCNC: 18 U/L
BILIRUB SERPL-MCNC: 0.5 MG/DL
BUN SERPL-MCNC: 14 MG/DL
CALCIUM SERPL-MCNC: 10.9 MG/DL
CHLORIDE SERPL-SCNC: 103 MMOL/L
CO2 SERPL-SCNC: 27 MMOL/L
CREAT SERPL-MCNC: 0.8 MG/DL
EST. GFR  (AFRICAN AMERICAN): >60 ML/MIN/1.73 M^2
EST. GFR  (NON AFRICAN AMERICAN): >60 ML/MIN/1.73 M^2
ESTIMATED AVG GLUCOSE: 120 MG/DL
GLUCOSE SERPL-MCNC: 90 MG/DL
HBA1C MFR BLD HPLC: 5.8 %
PHOSPHATE SERPL-MCNC: 3.1 MG/DL
POTASSIUM SERPL-SCNC: 4 MMOL/L
PROT SERPL-MCNC: 8.4 G/DL
PTH-INTACT SERPL-MCNC: 146 PG/ML
SODIUM SERPL-SCNC: 135 MMOL/L

## 2019-03-07 PROCEDURE — 3008F PR BODY MASS INDEX (BMI) DOCUMENTED: ICD-10-PCS | Mod: CPTII,S$GLB,, | Performed by: INTERNAL MEDICINE

## 2019-03-07 PROCEDURE — 3080F DIAST BP >= 90 MM HG: CPT | Mod: CPTII,S$GLB,, | Performed by: INTERNAL MEDICINE

## 2019-03-07 PROCEDURE — 99214 PR OFFICE/OUTPT VISIT, EST, LEVL IV, 30-39 MIN: ICD-10-PCS | Mod: S$GLB,,, | Performed by: INTERNAL MEDICINE

## 2019-03-07 PROCEDURE — 3077F PR MOST RECENT SYSTOLIC BLOOD PRESSURE >= 140 MM HG: ICD-10-PCS | Mod: CPTII,S$GLB,, | Performed by: INTERNAL MEDICINE

## 2019-03-07 PROCEDURE — 80053 COMPREHEN METABOLIC PANEL: CPT

## 2019-03-07 PROCEDURE — 3080F PR MOST RECENT DIASTOLIC BLOOD PRESSURE >= 90 MM HG: ICD-10-PCS | Mod: CPTII,S$GLB,, | Performed by: INTERNAL MEDICINE

## 2019-03-07 PROCEDURE — 3008F BODY MASS INDEX DOCD: CPT | Mod: CPTII,S$GLB,, | Performed by: INTERNAL MEDICINE

## 2019-03-07 PROCEDURE — 83036 HEMOGLOBIN GLYCOSYLATED A1C: CPT

## 2019-03-07 PROCEDURE — 3077F SYST BP >= 140 MM HG: CPT | Mod: CPTII,S$GLB,, | Performed by: INTERNAL MEDICINE

## 2019-03-07 PROCEDURE — 83970 ASSAY OF PARATHORMONE: CPT

## 2019-03-07 PROCEDURE — 84100 ASSAY OF PHOSPHORUS: CPT

## 2019-03-07 PROCEDURE — 36415 COLL VENOUS BLD VENIPUNCTURE: CPT

## 2019-03-07 PROCEDURE — 99999 PR PBB SHADOW E&M-EST. PATIENT-LVL III: CPT | Mod: PBBFAC,,, | Performed by: INTERNAL MEDICINE

## 2019-03-07 PROCEDURE — 99214 OFFICE O/P EST MOD 30 MIN: CPT | Mod: S$GLB,,, | Performed by: INTERNAL MEDICINE

## 2019-03-07 PROCEDURE — 99999 PR PBB SHADOW E&M-EST. PATIENT-LVL III: ICD-10-PCS | Mod: PBBFAC,,, | Performed by: INTERNAL MEDICINE

## 2019-03-07 NOTE — ASSESSMENT & PLAN NOTE
Lab Results   Component Value Date    HGBA1C 5.7 (H) 08/15/2018       alerted as to the increased risk for t2DM  Stressed diet and exercise   Periodic hba1c levels

## 2019-03-07 NOTE — ASSESSMENT & PLAN NOTE
Episodic with hyperparathyroidism  recheck urine studies  Follow bmd  Follow cmp q  6 months     Reviewed surgical indications  Avoid hctz  '

## 2019-03-07 NOTE — PROGRESS NOTES
Subjective:      Patient ID: Pastora Cota is a 53 y.o. female.    Chief Complaint:  Hyperparathyroidism and Multinodular goiter      History of Present Illness  In 2011 she presented with palpitations   She had a ct of the chest  The goiter was noted at that time  She had an u/s done and was advised to f/u  No fnas done      Last thyroid ultrasound 2015  Stable thyroid cysts.     RECOMMENDATIONS:   Follow up ultrasound in  5 years     she does not notice the goiter  No voice changes, no difficulty swallowing or breathing  no FH of thyroid disease or disease   no h/o radiation treatment or exposure  Did have light therapy for acne     Less fatigue     Denies polyuria, polydipsia, nocturia,or blurred vision.     She takes 1200 mg calcium daily plus 3-4 dairy servings.  She is  off HCTZ.  Denies constipation, bone pain, weakness.     bmd 12/8/16  Low bone mass/osteopenia of the lumbar spine and total hip. FRAX calculations do not suggest treatment.    No recent falls or fractures        With regards to   Body mass index is 28.95 kg/m².,  igt    Denies symptoms of hyperglycemia such as polyuria, polydipsia, nocturia, unexplained weight loss or blurred vision           Review of Systems   Constitutional: Negative for unexpected weight change.   Eyes: Negative for visual disturbance.   Respiratory: Negative for shortness of breath.    Cardiovascular: Negative for chest pain.   Gastrointestinal: Negative for abdominal pain.   Musculoskeletal: Negative for arthralgias.   Skin: Negative for wound.   Neurological: Negative for headaches.   Hematological: Does not bruise/bleed easily.   Psychiatric/Behavioral: Negative for sleep disturbance.       Objective:   Physical Exam   Neck:   No palpable thyroid tissue  Scar well healed      Cardiovascular: Normal rate.   Pulmonary/Chest: Effort normal.   Abdominal: Soft.   Musculoskeletal: She exhibits no edema.   Lymphadenopathy:     She has no cervical adenopathy.   Vitals  reviewed.      Body mass index is 28.95 kg/m².    Lab Review:   Lab Results   Component Value Date    HGBA1C 5.7 (H) 08/15/2018     Lab Results   Component Value Date    CHOL 215 (H) 08/15/2018    HDL 44 08/15/2018    LDLCALC 155.2 08/15/2018    TRIG 79 08/15/2018    CHOLHDL 20.5 08/15/2018     Lab Results   Component Value Date     08/15/2018    K 4.0 08/15/2018     08/15/2018    CO2 27 08/15/2018     (H) 08/15/2018    BUN 11 08/15/2018    CREATININE 0.9 08/15/2018    CALCIUM 10.4 08/15/2018    PROT 8.4 08/15/2018    ALBUMIN 4.2 08/15/2018    BILITOT 0.4 08/15/2018    ALKPHOS 113 08/15/2018    AST 17 08/15/2018    ALT 23 08/15/2018    ANIONGAP 9 08/15/2018    ESTGFRAFRICA >60 08/15/2018    EGFRNONAA >60 08/15/2018    TSH 0.632 08/15/2018       Assessment and Plan     Primary hyperparathyroidism  Episodic with hyperparathyroidism  recheck urine studies  Follow bmd  Follow cmp q  6 months     Reviewed surgical indications  Avoid hctz  '    Osteopenia    discussed implications  Reassuring not fracturing.   rda calcium and vit D  Follow over time  discussed indications for offering therapy        Hypertension  Follow at home       Multinodular goiter  Recheck      Discussed indications for a FNA      Discussed  surgical indications (abnormal FNA, compressive symptoms or interval change)          IGT (impaired glucose tolerance)  Lab Results   Component Value Date    HGBA1C 5.7 (H) 08/15/2018       alerted as to the increased risk for t2DM  Stressed diet and exercise   Periodic hba1c levels

## 2019-03-07 NOTE — ASSESSMENT & PLAN NOTE
Recheck      Discussed indications for a FNA      Discussed  surgical indications (abnormal FNA, compressive symptoms or interval change)

## 2019-03-07 NOTE — ASSESSMENT & PLAN NOTE
discussed implications  Reassuring not fracturing.   rda calcium and vit D  Follow over time  discussed indications for offering therapy

## 2019-03-14 ENCOUNTER — PATIENT MESSAGE (OUTPATIENT)
Dept: INTERNAL MEDICINE | Facility: CLINIC | Age: 54
End: 2019-03-14

## 2019-03-14 DIAGNOSIS — I10 ESSENTIAL HYPERTENSION: Primary | ICD-10-CM

## 2019-03-14 RX ORDER — IRBESARTAN 150 MG/1
150 TABLET ORAL NIGHTLY
Qty: 90 TABLET | Refills: 3 | Status: SHIPPED | OUTPATIENT
Start: 2019-03-14 | End: 2019-03-20

## 2019-03-20 ENCOUNTER — TELEPHONE (OUTPATIENT)
Dept: INTERNAL MEDICINE | Facility: CLINIC | Age: 54
End: 2019-03-20

## 2019-03-20 RX ORDER — LOSARTAN POTASSIUM 100 MG/1
100 TABLET ORAL DAILY
Qty: 90 TABLET | Refills: 0 | Status: ON HOLD | OUTPATIENT
Start: 2019-03-20 | End: 2019-04-10 | Stop reason: SDUPTHER

## 2019-03-20 NOTE — TELEPHONE ENCOUNTER
----- Message from Homer Jean Baptiste sent at 3/20/2019  9:46 AM CDT -----  Contact: SID GARCIA [[id number]]  Name of Who is Calling: SID GARCIA [[id number]]      What is the request in detail: Pt is calling in regards to irbesartan (AVAPRO) 150 MG tablet.     pt state the pharmacy is out of her medication and has check different [facility] also now of     them have medication an would like Rx for something else .Please contact to further discuss     and advise.       Can the clinic reply by [facility]:       What Number to Call Back if not in [facility]: [phone]

## 2019-03-25 ENCOUNTER — LAB VISIT (OUTPATIENT)
Dept: LAB | Facility: OTHER | Age: 54
End: 2019-03-25
Attending: INTERNAL MEDICINE
Payer: COMMERCIAL

## 2019-03-25 DIAGNOSIS — E21.0 PRIMARY HYPERPARATHYROIDISM: ICD-10-CM

## 2019-03-25 DIAGNOSIS — M85.80 OSTEOPENIA, UNSPECIFIED LOCATION: ICD-10-CM

## 2019-03-25 LAB
CALCIUM 24H UR-MRATE: 12 MG/HR (ref 4–12)
CALCIUM UR-MCNC: 23 MG/DL (ref 0–15)
CALCIUM URINE (MG/SPEC): 290 MG/SPEC
URINE COLLECTION DURATION: 24 HR
URINE VOLUME: 1260 ML

## 2019-03-25 PROCEDURE — 82340 ASSAY OF CALCIUM IN URINE: CPT

## 2019-04-03 ENCOUNTER — OFFICE VISIT (OUTPATIENT)
Dept: URGENT CARE | Facility: CLINIC | Age: 54
End: 2019-04-03
Payer: COMMERCIAL

## 2019-04-03 VITALS
BODY MASS INDEX: 28.68 KG/M2 | TEMPERATURE: 98 F | DIASTOLIC BLOOD PRESSURE: 97 MMHG | HEIGHT: 64 IN | SYSTOLIC BLOOD PRESSURE: 186 MMHG | OXYGEN SATURATION: 100 % | WEIGHT: 168 LBS | RESPIRATION RATE: 18 BRPM | HEART RATE: 91 BPM

## 2019-04-03 DIAGNOSIS — M62.838 NECK MUSCLE SPASM: ICD-10-CM

## 2019-04-03 DIAGNOSIS — S16.1XXA ACUTE CERVICAL MYOFASCIAL STRAIN, INITIAL ENCOUNTER: Primary | ICD-10-CM
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
## 2019-04-03 PROCEDURE — 3008F BODY MASS INDEX DOCD: CPT | Mod: CPTII,S$GLB,, | Performed by: EMERGENCY MEDICINE

## 2019-04-03 PROCEDURE — 99214 OFFICE O/P EST MOD 30 MIN: CPT | Mod: 25,S$GLB,, | Performed by: EMERGENCY MEDICINE

## 2019-04-03 PROCEDURE — 3080F DIAST BP >= 90 MM HG: CPT | Mod: CPTII,S$GLB,, | Performed by: EMERGENCY MEDICINE

## 2019-04-03 PROCEDURE — 3080F PR MOST RECENT DIASTOLIC BLOOD PRESSURE >= 90 MM HG: ICD-10-PCS | Mod: CPTII,S$GLB,, | Performed by: EMERGENCY MEDICINE

## 2019-04-03 PROCEDURE — 96372 PR INJECTION,THERAP/PROPH/DIAG2ST, IM OR SUBCUT: ICD-10-PCS | Mod: S$GLB,,, | Performed by: EMERGENCY MEDICINE

## 2019-04-03 PROCEDURE — 3077F PR MOST RECENT SYSTOLIC BLOOD PRESSURE >= 140 MM HG: ICD-10-PCS | Mod: CPTII,S$GLB,, | Performed by: EMERGENCY MEDICINE

## 2019-04-03 PROCEDURE — 3008F PR BODY MASS INDEX (BMI) DOCUMENTED: ICD-10-PCS | Mod: CPTII,S$GLB,, | Performed by: EMERGENCY MEDICINE

## 2019-04-03 PROCEDURE — 99214 PR OFFICE/OUTPT VISIT, EST, LEVL IV, 30-39 MIN: ICD-10-PCS | Mod: 25,S$GLB,, | Performed by: EMERGENCY MEDICINE

## 2019-04-03 PROCEDURE — 3077F SYST BP >= 140 MM HG: CPT | Mod: CPTII,S$GLB,, | Performed by: EMERGENCY MEDICINE

## 2019-04-03 PROCEDURE — 96372 THER/PROPH/DIAG INJ SC/IM: CPT | Mod: S$GLB,,, | Performed by: EMERGENCY MEDICINE

## 2019-04-03 RX ORDER — CYCLOBENZAPRINE HCL 5 MG
TABLET ORAL
Qty: 25 TABLET | Refills: 0 | Status: ON HOLD | OUTPATIENT
Start: 2019-04-03 | End: 2019-04-10 | Stop reason: HOSPADM

## 2019-04-03 RX ORDER — KETOROLAC TROMETHAMINE 30 MG/ML
30 INJECTION, SOLUTION INTRAMUSCULAR; INTRAVENOUS
Status: COMPLETED | OUTPATIENT
Start: 2019-04-03 | End: 2019-04-03

## 2019-04-03 RX ORDER — IBUPROFEN 600 MG/1
600 TABLET ORAL EVERY 6 HOURS PRN
Qty: 20 TABLET | Refills: 0 | Status: ON HOLD | OUTPATIENT
Start: 2019-04-03 | End: 2019-04-10 | Stop reason: HOSPADM

## 2019-04-03 RX ADMIN — KETOROLAC TROMETHAMINE 30 MG: 30 INJECTION, SOLUTION INTRAMUSCULAR; INTRAVENOUS at 07:04
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
## 2019-04-06 ENCOUNTER — HOSPITAL ENCOUNTER (INPATIENT)
Facility: OTHER | Age: 54
LOS: 4 days | Discharge: HOME | DRG: 872 | End: 2019-04-10
Attending: EMERGENCY MEDICINE | Admitting: HOSPITALIST
Payer: COMMERCIAL

## 2019-04-06 DIAGNOSIS — A09 INFECTIOUS COLITIS: Primary | ICD-10-CM

## 2019-04-06 DIAGNOSIS — R10.9 ABDOMINAL PAIN: ICD-10-CM

## 2019-04-06 DIAGNOSIS — K55.9 MESENTERIC ISCHEMIA: ICD-10-CM

## 2019-04-06 DIAGNOSIS — K52.9 COLITIS: ICD-10-CM

## 2019-04-06 DIAGNOSIS — R93.5 ABNORMAL ABDOMINAL CT SCAN: ICD-10-CM

## 2019-04-06 LAB
ALBUMIN SERPL BCP-MCNC: 4.1 G/DL (ref 3.5–5.2)
ALBUMIN SERPL BCP-MCNC: 4.1 G/DL (ref 3.5–5.2)
ALP SERPL-CCNC: 117 U/L (ref 55–135)
ALP SERPL-CCNC: 117 U/L (ref 55–135)
ALT SERPL W/O P-5'-P-CCNC: 30 U/L (ref 10–44)
ALT SERPL W/O P-5'-P-CCNC: 30 U/L (ref 10–44)
ANION GAP SERPL CALC-SCNC: 11 MMOL/L (ref 8–16)
ANISOCYTOSIS BLD QL SMEAR: SLIGHT
APTT BLDCRRT: 24.6 SEC (ref 21–32)
AST SERPL-CCNC: 24 U/L (ref 10–40)
AST SERPL-CCNC: 24 U/L (ref 10–40)
BASOPHILS # BLD AUTO: ABNORMAL K/UL (ref 0–0.2)
BASOPHILS NFR BLD: 0 % (ref 0–1.9)
BILIRUB DIRECT SERPL-MCNC: 0.1 MG/DL (ref 0.1–0.3)
BILIRUB SERPL-MCNC: 0.3 MG/DL (ref 0.1–1)
BILIRUB SERPL-MCNC: 0.3 MG/DL (ref 0.1–1)
BILIRUB UR QL STRIP: NEGATIVE
BUN SERPL-MCNC: 16 MG/DL (ref 6–20)
CALCIUM SERPL-MCNC: 10.8 MG/DL (ref 8.7–10.5)
CHLORIDE SERPL-SCNC: 107 MMOL/L (ref 95–110)
CLARITY UR: CLEAR
CO2 SERPL-SCNC: 23 MMOL/L (ref 23–29)
COLOR UR: YELLOW
CREAT SERPL-MCNC: 0.9 MG/DL (ref 0.5–1.4)
D DIMER PPP IA.FEU-MCNC: 2.93 MG/L FEU
DIFFERENTIAL METHOD: ABNORMAL
EOSINOPHIL # BLD AUTO: ABNORMAL K/UL (ref 0–0.5)
EOSINOPHIL NFR BLD: 0 % (ref 0–8)
ERYTHROCYTE [DISTWIDTH] IN BLOOD BY AUTOMATED COUNT: 14.8 % (ref 11.5–14.5)
EST. GFR  (AFRICAN AMERICAN): >60 ML/MIN/1.73 M^2
EST. GFR  (NON AFRICAN AMERICAN): >60 ML/MIN/1.73 M^2
GLUCOSE SERPL-MCNC: 135 MG/DL (ref 70–110)
GLUCOSE UR QL STRIP: NEGATIVE
HCT VFR BLD AUTO: 39.7 % (ref 37–48.5)
HGB BLD-MCNC: 12.7 G/DL (ref 12–16)
HGB UR QL STRIP: NEGATIVE
INR PPP: 1 (ref 0.8–1.2)
KETONES UR QL STRIP: NEGATIVE
LACTATE SERPL-SCNC: 2.3 MMOL/L (ref 0.5–2.2)
LACTATE SERPL-SCNC: 2.7 MMOL/L (ref 0.5–2.2)
LEUKOCYTE ESTERASE UR QL STRIP: NEGATIVE
LIPASE SERPL-CCNC: 20 U/L (ref 4–60)
LYMPHOCYTES # BLD AUTO: ABNORMAL K/UL (ref 1–4.8)
LYMPHOCYTES NFR BLD: 72 % (ref 18–48)
MCH RBC QN AUTO: 23 PG (ref 27–31)
MCHC RBC AUTO-ENTMCNC: 32 G/DL (ref 32–36)
MCV RBC AUTO: 72 FL (ref 82–98)
MONOCYTES # BLD AUTO: ABNORMAL K/UL (ref 0.3–1)
MONOCYTES NFR BLD: 6 % (ref 4–15)
NEUTROPHILS NFR BLD: 22 % (ref 38–73)
NITRITE UR QL STRIP: NEGATIVE
PH UR STRIP: 7 [PH] (ref 5–8)
PLATELET # BLD AUTO: 349 K/UL (ref 150–350)
PLATELET BLD QL SMEAR: ABNORMAL
PMV BLD AUTO: 9.4 FL (ref 9.2–12.9)
POTASSIUM SERPL-SCNC: 3.4 MMOL/L (ref 3.5–5.1)
PROT SERPL-MCNC: 8.3 G/DL (ref 6–8.4)
PROT SERPL-MCNC: 8.3 G/DL (ref 6–8.4)
PROT UR QL STRIP: NEGATIVE
PROTHROMBIN TIME: 10.8 SEC (ref 9–12.5)
RBC # BLD AUTO: 5.52 M/UL (ref 4–5.4)
SODIUM SERPL-SCNC: 141 MMOL/L (ref 136–145)
SP GR UR STRIP: 1.01 (ref 1–1.03)
URN SPEC COLLECT METH UR: NORMAL
UROBILINOGEN UR STRIP-ACNC: NEGATIVE EU/DL
WBC # BLD AUTO: 10.3 K/UL (ref 3.9–12.7)

## 2019-04-06 PROCEDURE — 93010 ELECTROCARDIOGRAM REPORT: CPT | Mod: ,,, | Performed by: INTERNAL MEDICINE

## 2019-04-06 PROCEDURE — 63600175 PHARM REV CODE 636 W HCPCS: Performed by: PHYSICIAN ASSISTANT

## 2019-04-06 PROCEDURE — 99223 PR INITIAL HOSPITAL CARE,LEVL III: ICD-10-PCS | Mod: ,,, | Performed by: PHYSICIAN ASSISTANT

## 2019-04-06 PROCEDURE — 94761 N-INVAS EAR/PLS OXIMETRY MLT: CPT

## 2019-04-06 PROCEDURE — 83605 ASSAY OF LACTIC ACID: CPT

## 2019-04-06 PROCEDURE — 36415 COLL VENOUS BLD VENIPUNCTURE: CPT

## 2019-04-06 PROCEDURE — 25500020 PHARM REV CODE 255: Performed by: EMERGENCY MEDICINE

## 2019-04-06 PROCEDURE — 85730 THROMBOPLASTIN TIME PARTIAL: CPT

## 2019-04-06 PROCEDURE — 25000003 PHARM REV CODE 250: Performed by: PHYSICIAN ASSISTANT

## 2019-04-06 PROCEDURE — 93010 EKG 12-LEAD: ICD-10-PCS | Mod: ,,, | Performed by: INTERNAL MEDICINE

## 2019-04-06 PROCEDURE — 93005 ELECTROCARDIOGRAM TRACING: CPT

## 2019-04-06 PROCEDURE — 83605 ASSAY OF LACTIC ACID: CPT | Mod: 91

## 2019-04-06 PROCEDURE — 96361 HYDRATE IV INFUSION ADD-ON: CPT | Mod: 59

## 2019-04-06 PROCEDURE — 63600175 PHARM REV CODE 636 W HCPCS: Performed by: STUDENT IN AN ORGANIZED HEALTH CARE EDUCATION/TRAINING PROGRAM

## 2019-04-06 PROCEDURE — 85610 PROTHROMBIN TIME: CPT

## 2019-04-06 PROCEDURE — 96376 TX/PRO/DX INJ SAME DRUG ADON: CPT | Mod: 59

## 2019-04-06 PROCEDURE — 87040 BLOOD CULTURE FOR BACTERIA: CPT

## 2019-04-06 PROCEDURE — 63600175 PHARM REV CODE 636 W HCPCS: Performed by: EMERGENCY MEDICINE

## 2019-04-06 PROCEDURE — P9612 CATHETERIZE FOR URINE SPEC: HCPCS

## 2019-04-06 PROCEDURE — 25000003 PHARM REV CODE 250: Performed by: EMERGENCY MEDICINE

## 2019-04-06 PROCEDURE — 80053 COMPREHEN METABOLIC PANEL: CPT

## 2019-04-06 PROCEDURE — 99285 EMERGENCY DEPT VISIT HI MDM: CPT | Mod: 25

## 2019-04-06 PROCEDURE — 85379 FIBRIN DEGRADATION QUANT: CPT

## 2019-04-06 PROCEDURE — 99223 1ST HOSP IP/OBS HIGH 75: CPT | Mod: ,,, | Performed by: PHYSICIAN ASSISTANT

## 2019-04-06 PROCEDURE — 20000000 HC ICU ROOM

## 2019-04-06 PROCEDURE — 96375 TX/PRO/DX INJ NEW DRUG ADDON: CPT

## 2019-04-06 PROCEDURE — 85025 COMPLETE CBC W/AUTO DIFF WBC: CPT

## 2019-04-06 PROCEDURE — 96374 THER/PROPH/DIAG INJ IV PUSH: CPT | Mod: 59

## 2019-04-06 PROCEDURE — 96365 THER/PROPH/DIAG IV INF INIT: CPT

## 2019-04-06 PROCEDURE — 81003 URINALYSIS AUTO W/O SCOPE: CPT

## 2019-04-06 PROCEDURE — 80076 HEPATIC FUNCTION PANEL: CPT

## 2019-04-06 PROCEDURE — 83690 ASSAY OF LIPASE: CPT

## 2019-04-06 RX ORDER — CIPROFLOXACIN 2 MG/ML
400 INJECTION, SOLUTION INTRAVENOUS
Status: COMPLETED | OUTPATIENT
Start: 2019-04-06 | End: 2019-04-06

## 2019-04-06 RX ORDER — HYDRALAZINE HYDROCHLORIDE 20 MG/ML
10 INJECTION INTRAMUSCULAR; INTRAVENOUS EVERY 6 HOURS PRN
Status: DISCONTINUED | OUTPATIENT
Start: 2019-04-06 | End: 2019-04-07

## 2019-04-06 RX ORDER — ONDANSETRON 2 MG/ML
8 INJECTION INTRAMUSCULAR; INTRAVENOUS
Status: COMPLETED | OUTPATIENT
Start: 2019-04-06 | End: 2019-04-06

## 2019-04-06 RX ORDER — HYDROMORPHONE HYDROCHLORIDE 1 MG/ML
0.5 INJECTION, SOLUTION INTRAMUSCULAR; INTRAVENOUS; SUBCUTANEOUS ONCE
Status: COMPLETED | OUTPATIENT
Start: 2019-04-06 | End: 2019-04-06

## 2019-04-06 RX ORDER — SODIUM CHLORIDE 0.9 % (FLUSH) 0.9 %
10 SYRINGE (ML) INJECTION
Status: DISCONTINUED | OUTPATIENT
Start: 2019-04-06 | End: 2019-04-09

## 2019-04-06 RX ORDER — POTASSIUM CHLORIDE 7.45 MG/ML
10 INJECTION INTRAVENOUS
Status: COMPLETED | OUTPATIENT
Start: 2019-04-06 | End: 2019-04-06

## 2019-04-06 RX ORDER — HEPARIN SODIUM 5000 [USP'U]/ML
5000 INJECTION, SOLUTION INTRAVENOUS; SUBCUTANEOUS EVERY 8 HOURS
Status: DISCONTINUED | OUTPATIENT
Start: 2019-04-06 | End: 2019-04-10 | Stop reason: HOSPADM

## 2019-04-06 RX ORDER — SODIUM CHLORIDE 0.9 % (FLUSH) 0.9 %
10 SYRINGE (ML) INJECTION
Status: DISCONTINUED | OUTPATIENT
Start: 2019-04-06 | End: 2019-04-08

## 2019-04-06 RX ORDER — DEXTROMETHORPHAN/PSEUDOEPHED 2.5-7.5/.8
40 DROPS ORAL 4 TIMES DAILY PRN
Status: DISCONTINUED | OUTPATIENT
Start: 2019-04-06 | End: 2019-04-10 | Stop reason: HOSPADM

## 2019-04-06 RX ORDER — HYDROMORPHONE HYDROCHLORIDE 1 MG/ML
1 INJECTION, SOLUTION INTRAMUSCULAR; INTRAVENOUS; SUBCUTANEOUS
Status: COMPLETED | OUTPATIENT
Start: 2019-04-06 | End: 2019-04-06

## 2019-04-06 RX ORDER — HYDROMORPHONE HYDROCHLORIDE 1 MG/ML
1 INJECTION, SOLUTION INTRAMUSCULAR; INTRAVENOUS; SUBCUTANEOUS EVERY 6 HOURS PRN
Status: DISCONTINUED | OUTPATIENT
Start: 2019-04-06 | End: 2019-04-07

## 2019-04-06 RX ORDER — ONDANSETRON 2 MG/ML
8 INJECTION INTRAMUSCULAR; INTRAVENOUS EVERY 8 HOURS PRN
Status: DISCONTINUED | OUTPATIENT
Start: 2019-04-06 | End: 2019-04-10 | Stop reason: HOSPADM

## 2019-04-06 RX ORDER — HYDROMORPHONE HYDROCHLORIDE 1 MG/ML
0.5 INJECTION, SOLUTION INTRAMUSCULAR; INTRAVENOUS; SUBCUTANEOUS
Status: COMPLETED | OUTPATIENT
Start: 2019-04-06 | End: 2019-04-06

## 2019-04-06 RX ORDER — HYDROMORPHONE HYDROCHLORIDE 1 MG/ML
0.5 INJECTION, SOLUTION INTRAMUSCULAR; INTRAVENOUS; SUBCUTANEOUS
Status: DISCONTINUED | OUTPATIENT
Start: 2019-04-06 | End: 2019-04-07

## 2019-04-06 RX ORDER — VANCOMYCIN HCL IN 5 % DEXTROSE 1G/250ML
1000 PLASTIC BAG, INJECTION (ML) INTRAVENOUS
Status: DISCONTINUED | OUTPATIENT
Start: 2019-04-06 | End: 2019-04-09

## 2019-04-06 RX ORDER — SODIUM CHLORIDE 9 MG/ML
INJECTION, SOLUTION INTRAVENOUS CONTINUOUS
Status: DISCONTINUED | OUTPATIENT
Start: 2019-04-06 | End: 2019-04-09

## 2019-04-06 RX ORDER — SODIUM CHLORIDE 9 MG/ML
1000 INJECTION, SOLUTION INTRAVENOUS
Status: COMPLETED | OUTPATIENT
Start: 2019-04-06 | End: 2019-04-06

## 2019-04-06 RX ORDER — POTASSIUM CHLORIDE 7.46 G/1000ML
10 INJECTION, SOLUTION INTRAVENOUS
Status: DISCONTINUED | OUTPATIENT
Start: 2019-04-06 | End: 2019-04-06 | Stop reason: CLARIF

## 2019-04-06 RX ADMIN — SODIUM CHLORIDE 1000 ML: 0.9 INJECTION, SOLUTION INTRAVENOUS at 03:04

## 2019-04-06 RX ADMIN — SODIUM CHLORIDE: 0.9 INJECTION, SOLUTION INTRAVENOUS at 08:04

## 2019-04-06 RX ADMIN — CIPROFLOXACIN 400 MG: 2 INJECTION, SOLUTION INTRAVENOUS at 07:04

## 2019-04-06 RX ADMIN — ONDANSETRON 8 MG: 2 INJECTION INTRAMUSCULAR; INTRAVENOUS at 03:04

## 2019-04-06 RX ADMIN — HEPARIN SODIUM 5000 UNITS: 5000 INJECTION, SOLUTION INTRAVENOUS; SUBCUTANEOUS at 10:04

## 2019-04-06 RX ADMIN — SODIUM CHLORIDE, SODIUM LACTATE, POTASSIUM CHLORIDE, AND CALCIUM CHLORIDE 1000 ML: .6; .31; .03; .02 INJECTION, SOLUTION INTRAVENOUS at 05:04

## 2019-04-06 RX ADMIN — POTASSIUM CHLORIDE 10 MEQ: 10 INJECTION, SOLUTION INTRAVENOUS at 11:04

## 2019-04-06 RX ADMIN — HYDRALAZINE HYDROCHLORIDE 10 MG: 20 INJECTION INTRAMUSCULAR; INTRAVENOUS at 11:04

## 2019-04-06 RX ADMIN — HYDROMORPHONE HYDROCHLORIDE 1 MG: 1 INJECTION, SOLUTION INTRAMUSCULAR; INTRAVENOUS; SUBCUTANEOUS at 06:04

## 2019-04-06 RX ADMIN — VANCOMYCIN HYDROCHLORIDE 1000 MG: 1 INJECTION, POWDER, FOR SOLUTION INTRAVENOUS at 10:04

## 2019-04-06 RX ADMIN — IOHEXOL 75 ML: 350 INJECTION, SOLUTION INTRAVENOUS at 05:04

## 2019-04-06 RX ADMIN — PIPERACILLIN AND TAZOBACTAM 4.5 G: 4; .5 INJECTION, POWDER, LYOPHILIZED, FOR SOLUTION INTRAVENOUS; PARENTERAL at 07:04

## 2019-04-06 RX ADMIN — HYDROMORPHONE HYDROCHLORIDE 0.5 MG: 1 INJECTION, SOLUTION INTRAMUSCULAR; INTRAVENOUS; SUBCUTANEOUS at 04:04

## 2019-04-06 RX ADMIN — HYDROMORPHONE HYDROCHLORIDE 0.5 MG: 1 INJECTION, SOLUTION INTRAMUSCULAR; INTRAVENOUS; SUBCUTANEOUS at 07:04

## 2019-04-06 RX ADMIN — HYDROMORPHONE HYDROCHLORIDE 0.5 MG: 1 INJECTION, SOLUTION INTRAMUSCULAR; INTRAVENOUS; SUBCUTANEOUS at 03:04

## 2019-04-06 RX ADMIN — POTASSIUM CHLORIDE 10 MEQ: 10 INJECTION, SOLUTION INTRAVENOUS at 10:04

## 2019-04-06 NOTE — ED PROVIDER NOTES
"Encounter Date: 4/6/2019    SCRIBE #1 NOTE: I, Rama Mejia, am scribing for, and in the presence of, Dr. Nice.       History     Chief Complaint   Patient presents with    Abdominal Pain     + new onset of bilateral lower abdominal pains " while I was sitting down" around 10 minutes ago. Denies N/V. Pt reports having x 1 small BM PTA reported as "normal".      Time seen by provider: 3:31 PM    This is a 53 y.o. female with hx of HTN who presents with complaint of abdominal pain that began 1.5 hours ago. The has improved from 10/10 to 8/10. She reports dry mouth. She denies SOB, chest pain, nausea, vomiting, diarrhea, constipation, back pain, dysuria, or light headedness. Last normal bowel movement was PTA. Last meal was breakfast this morning. PSHx includes hysterectomy, oophorectomy, and cholecystectomy.    The history is provided by the patient.     Review of patient's allergies indicates:   Allergen Reactions    Sulfa (sulfonamide antibiotics) Hives and Edema     Past Medical History:   Diagnosis Date    Abnormal Pap smear     repeat normal    Abnormal Pap smear of cervix     Abnormal Pap smear of vagina     Allergy     seasonal    AR (allergic rhinitis)     Hypercalcemia 9/10/2015    Hypertension     IGT (impaired glucose tolerance) 9/9/2014    Pneumonia 12/2016     Past Surgical History:   Procedure Laterality Date    ABDOMINAL SURGERY      CHOLECYSTECTOMY      1993    COLONOSCOPY N/A 12/27/2017    [facility] F[facility])    COLONOSCOPY N/A 9/24/2016    [facility] ([facility] F[facility])    COLPOSCOPY      DILATION AND CURETTAGE OF UTERUS      DRAINAGE N/A 12/28/2016    Performed by [facility] CATH LAB    DRAINAGE N/A 12/22/2016    Performed by [facility] at [facility] CATH LAB    HYSTERECTOMY      2007 tahbso     OOPHORECTOMY      THORACENTESIS N/A 12/20/2016    Performed by [facility] at [facility] CATH " LAB     Family History   Problem Relation Age of Onset    Hypertension Mother     Diabetes Mother     Glaucoma Mother     Hypertension Sister         four sisters    Breast cancer Sister 44    Glaucoma Father     No Known Problems Brother     No Known Problems Maternal Aunt     No Known Problems Maternal Uncle     No Known Problems Paternal Aunt     No Known Problems Paternal Uncle     No Known Problems Maternal Grandmother     No Known Problems Maternal Grandfather     No Known Problems Paternal Grandmother     No Known Problems Paternal Grandfather     Thyroid cancer Neg Hx     Cancer Neg Hx     Colon cancer Neg Hx     Ovarian cancer Neg Hx     Melanoma Neg Hx     Psoriasis Neg Hx     Lupus Neg Hx     Amblyopia Neg Hx     Blindness Neg Hx     Cataracts Neg Hx     Macular degeneration Neg Hx     Retinal detachment Neg Hx     Strabismus Neg Hx     Stroke Neg Hx     Thyroid disease Neg Hx      Social History     Tobacco Use    Smoking status: Never Smoker    Smokeless tobacco: Never Used   Substance Use Topics    Alcohol use: Yes     Alcohol/week: 0.0 oz     Types: 1 Standard drinks or equivalent per week     Comment: socailly    Drug use: No     Review of Systems   Constitutional: Negative for fever.   HENT: Negative for sore throat.         Positive for dry mouth.   Respiratory: Negative for shortness of breath.    Cardiovascular: Negative for chest pain.   Gastrointestinal: Positive for abdominal pain. Negative for abdominal distention, blood in stool, constipation, diarrhea, nausea and vomiting.   Genitourinary: Negative for decreased urine volume, difficulty urinating, dysuria, frequency, hematuria, urgency, vaginal bleeding and vaginal discharge.   Musculoskeletal: Negative for back pain.   Skin: Negative for rash.   Neurological: Negative for weakness and light-headedness.   Hematological: Does not bruise/bleed easily.       Physical Exam     Initial Vitals   BP Pulse Resp  Temp SpO2   04/06/19 1411 04/06/19 1411 04/06/19 1411 04/06/19 1423 04/06/19 1411   127/69 109 18 97.9 °F (36.6 °C) 98 %      MAP       --                Physical Exam    Nursing note and vitals reviewed.  Constitutional: She appears well-developed and well-nourished. She is not diaphoretic. No distress.   HENT:   Head: Normocephalic and atraumatic.   Right Ear: External ear normal.   Left Ear: External ear normal.   Eyes: Conjunctivae and EOM are normal. Pupils are equal, round, and reactive to light. No scleral icterus.   Neck: Normal range of motion. Neck supple.   Cardiovascular: Normal rate, regular rhythm and normal heart sounds. Exam reveals no gallop and no friction rub.    No murmur heard.  Pulmonary/Chest: Breath sounds normal. No respiratory distress. She has no wheezes. She has no rhonchi. She has no rales.   Abdominal: Soft. Bowel sounds are normal. She exhibits no distension. There is tenderness. There is guarding. There is no rebound.   Left mid abdominal, LLQ, and RLQ tenderness.   Musculoskeletal: Normal range of motion. She exhibits no edema or tenderness.   Neurological: She is alert and oriented to person, place, and time.   Skin: Skin is warm and dry.   Psychiatric: She has a normal mood and affect. Her behavior is normal. Judgment and thought content normal.         ED Course   Procedures  Labs Reviewed   CBC W/ AUTO DIFFERENTIAL - Abnormal; Notable for the following components:       Result Value    RBC 5.52 (*)     MCV 72 (*)     MCH 23.0 (*)     RDW 14.8 (*)     Gran% 22.0 (*)     Lymph% 72.0 (*)     Platelet Estimate Increased (*)     All other components within normal limits   COMPREHENSIVE METABOLIC PANEL - Abnormal; Notable for the following components:    Potassium 3.4 (*)     Glucose 135 (*)     Calcium 10.8 (*)     All other components within normal limits   LACTIC ACID, PLASMA - Abnormal; Notable for the following components:    Lactate (Lactic Acid) 2.3 (*)     All other components  within normal limits   URINALYSIS, REFLEX TO URINE CULTURE    Narrative:     Preferred Collection Type->Urine, Clean Catch   LIPASE   HEPATIC FUNCTION PANEL   D DIMER, QUANTITATIVE   HEPATIC FUNCTION PANEL     EKG Readings: (Independently Interpreted)   Normal sinus rhythm. Heart rate 90. QTc 462. Normal axis. No STEMI. T wave inversion in AVL.       Imaging Results          US Mesenteric Ischemia Study (xpd) (Final result)  Result time 04/06/19 20:11:43    Final result by Vicenta Medeiros MD (04/06/19 20:11:43)                 Impression:      As above described.      Electronically signed by: Vicenta Medeiros  Date:    04/06/2019  Time:    20:11             Narrative:    EXAMINATION:  Ultrasound mesenteric ischemia study    CLINICAL HISTORY:  Evaluate for mesenteric ischemia;    TECHNIQUE:  Real-time ultrasound of the abdomen was performed to evaluate the mesentery.    COMPARISON:  None.    FINDINGS:  Examination is nondiagnostic secondary to excessive overlying bowel gas.                                CT Abdomen Pelvis With Contrast (Final result)  Result time 04/06/19 18:05:27    Final result by Carlos Maravilla MD (04/06/19 18:05:27)                 Impression:      No CT findings of acute diverticulitis.    Fluid distention and wall thickening involving the small and large bowel loops, suggestive of enterocolitis.  Free fluid in the pelvis and nonspecific fluid throughout the mesentery.  Some component of early bowel ischemia be difficult to entirely exclude.  Correlation with serum lactate levels is suggested.    Low-attenuation wedge-shaped areas in the lower pole of the left kidney, most consistent with renal infarctions.  Renal vein and the renal arteries remain patent.  Correlation with echocardiogram may be obtained.    Status post cholecystectomy.  No abnormality in the gallbladder fossa.    Poor definition of the uterus with suspected fluid within the expected location of the endometrial canal patient  reported history of hysterectomy.  Pelvic ultrasound may be attempted for further evaluation.    Airspace opacities in the bilateral lower lobes.  Follow-up to resolution is suggested.    Additional findings as above.    This report was flagged in Epic as abnormal.      Electronically signed by: Carlos Maravilla MD  Date:    04/06/2019  Time:    18:05             Narrative:    EXAMINATION:  CT ABDOMEN PELVIS WITH CONTRAST    CLINICAL HISTORY:  LLQ pain, suspect diverticulitis;    TECHNIQUE:  Low dose axial images, sagittal and coronal reformations were obtained from the lung bases to the pubic symphysis following the IV administration of 75 mL of Omnipaque 350 .  Oral contrast was not given.    COMPARISON:  None.    FINDINGS:  There are no pleural effusions.  There is no evidence of a pneumothorax.  There are airspace opacities in the bilateral lower lobes.    The heart is unremarkable.  There are calcifications in the abdominal aorta.  There also calcifications involving the ostia of the aortic branch vessels including the renal arteries.  The portal veins are unremarkable.  The mesenteric vessels are within normal limits.  The renal veins remain patent.  The IVC and the remainder of the venous structures are within normal limits.  There is no evidence of lymphadenopathy in the abdomen or pelvis.    The esophagus, stomach, and duodenum are within normal limits.  There is fluid distention and wall thickening involving the small bowel loops.  No transition point is identified.  The appendix is not consistently identified.  There are no secondary findings of acute appendicitis.  There is diffuse wall thickening involving the large bowel loops.    The liver is unremarkable.  The patient is status post cholecystectomy.  There is no abnormality in the gallbladder fossa.  The biliary tree is unremarkable.  The spleen is unremarkable.  The pancreas is within normal limits.    The adrenal glands are unremarkable.  The right  kidney is unremarkable.  There is a 3 cm simple cyst in the upper pole of the left kidney.  There are multiple peripheral wedge-shaped low-attenuation regions in the lower pole of the left kidney.  The ureters are within normal limits.  The urinary bladder is within normal limits.    There is poor definition of the uterus.  There is a 4.9 x 3.0 cm fluid intensity within the endometrium.  The adnexal structures are poorly delineated.    There is small amount of free fluid in the pelvis.  There also scattered areas of free fluid throughout the mesentery.  There is marked stranding and inflammatory changes throughout the mesentery.  There is no evidence of free air.  There is no evidence of pneumatosis.    The psoas margins are unremarkable.  The abdominal wall is within normal limits.  There are degenerative changes in the osseous structures.                                 Medical Decision Making:   Differential Diagnosis:   Colitis, diverticulitis, UTI, mesenteric ischemia  Independently Interpreted Test(s):   I have ordered and independently interpreted EKG Reading(s) - see prior notes  Clinical Tests:   Lab Tests: Ordered and Reviewed  Radiological Study: Ordered and Reviewed  Medical Tests: Ordered and Reviewed  ED Management:  This is an emergent eval of a 53Y AAF hx HTN presenting with lower abdominal pain.  On exam she is writing around in pain, non toxic, afebrile with tenderness in the left lower quadrant, periumbilical area and right lower quadrant without rebound  however + guarding present.     3:48 PM - Reassessment. Pain is rated 10/10 s/p dilaudid. Will re dose.  Labs remarkable for elevated lactic acid 2.6.  CT with bowel wall thickening and free fluid in abdomen, concerns for mesenteric ischemia.   Pain is out of proportion of exam. Patient has received a total of 2 mg of dilaudid without relief.   Spoke with on call general surgery who will await hospitaist to evaluate patient first.  Communicated  concerns for mesenteric ischemia.  Spoke with ROBERT Thomas for admission. Vanc, zosyn and cipro have been given to cover for colitis. She is currently not septic as there is no leukocytosis, no tachypnea or fever.   Other:   I have discussed this case with another health care provider.            Scribe Attestation:   Scribe #1: I performed the above scribed service and the documentation accurately describes the services I performed. I attest to the accuracy of the note.    Attending Attestation:           Physician Attestation for Scribe:  Physician Attestation Statement for Scribe #1: I, Dr. Nice, reviewed documentation, as scribed by Rama Mejia in my presence, and it is both accurate and complete.                    Clinical Impression:     1. Mesenteric ischemia    2. Abdominal pain    3. Abnormal abdominal CT scan                                 Suzy Nice MD  04/07/19 1425       Suzy Nice MD  04/07/19 [id number]

## 2019-04-06 NOTE — ED NOTES
Pt attempted to provide urine sample, was unsuccessful.  MD states okay to straight cath at this time.

## 2019-04-06 NOTE — ED NOTES
Pt states dilaudid is not helping, requesting RN ask MD if she can have something else.  MD states she will change dilaudid dose to 1 mg.

## 2019-04-06 NOTE — ED NOTES
Pt remains on cardiac monitor, continuous pulse oximetry and automatic blood pressure cuff cycling w/ alarms set. Bed placed in low locked position, side rails up x 2, call light is within reach of patient or family, alarms set and turned on for monitor and pulse ox, will continue to monitor.

## 2019-04-06 NOTE — ED NOTES
"Call light answered w/ patient requesting pain medication for generalized abdominal pains. Pt states," My pain is still a 10. The medicine has not helped. I got two dose of that same medicine and it doesn't work for me". MD Nice aware, awaiting further orders at this time.   "

## 2019-04-06 NOTE — ED NOTES
"URINE COLLECTION PENDING: Pt states she cannot void at this time. Pt is tearful, states "I can't take this pain". Pt encouraged to attempt a urine sample. Sterile specimen container and urine clean catch instructions given to patient. Pt instructed to notify nurse immediately once urine specimen has been obtained. Pt verbalize understanding. Will continue to monitor.     "

## 2019-04-06 NOTE — ED TRIAGE NOTES
"Pt reports lower abd pain starting approximately 25 minutes ago while walking to a fair. Reports pain was so bad she had to go back to her car to sit. Reports small BM PTA. Unable to describe quality of pain "it just hurts." pt appears uncomfortable. Reports she did not eat any dina foods today. Denies N/V.  "

## 2019-04-07 PROBLEM — Z80.3 FAMILY HISTORY OF BREAST CANCER IN FIRST DEGREE RELATIVE: Status: RESOLVED | Noted: 2017-09-20 | Resolved: 2019-04-07

## 2019-04-07 PROBLEM — R33.9 URINE RETENTION: Status: ACTIVE | Noted: 2019-04-07

## 2019-04-07 PROBLEM — E87.6 HYPOKALEMIA: Status: ACTIVE | Noted: 2019-04-07

## 2019-04-07 PROBLEM — E87.20 LACTIC ACIDOSIS: Status: ACTIVE | Noted: 2019-04-07

## 2019-04-07 PROBLEM — R93.5 ABNORMAL ABDOMINAL CT SCAN: Status: RESOLVED | Noted: 2019-04-06 | Resolved: 2019-04-07

## 2019-04-07 PROBLEM — A41.9 SEVERE SEPSIS: Status: ACTIVE | Noted: 2019-04-07

## 2019-04-07 PROBLEM — R65.20 SEVERE SEPSIS: Status: ACTIVE | Noted: 2019-04-07

## 2019-04-07 LAB
ANION GAP SERPL CALC-SCNC: 10 MMOL/L (ref 8–16)
ANISOCYTOSIS BLD QL SMEAR: SLIGHT
BASOPHILS # BLD AUTO: 0.01 K/UL (ref 0–0.2)
BASOPHILS NFR BLD: 0.1 % (ref 0–1.9)
BUN SERPL-MCNC: 11 MG/DL (ref 6–20)
CALCIUM SERPL-MCNC: 10 MG/DL (ref 8.7–10.5)
CHLORIDE SERPL-SCNC: 107 MMOL/L (ref 95–110)
CHOLEST SERPL-MCNC: 130 MG/DL (ref 120–199)
CHOLEST/HDLC SERPL: 3.4 {RATIO} (ref 2–5)
CO2 SERPL-SCNC: 20 MMOL/L (ref 23–29)
CREAT SERPL-MCNC: 0.8 MG/DL (ref 0.5–1.4)
DIFFERENTIAL METHOD: ABNORMAL
EOSINOPHIL # BLD AUTO: 0 K/UL (ref 0–0.5)
EOSINOPHIL NFR BLD: 0 % (ref 0–8)
ERYTHROCYTE [DISTWIDTH] IN BLOOD BY AUTOMATED COUNT: 15 % (ref 11.5–14.5)
EST. GFR  (AFRICAN AMERICAN): >60 ML/MIN/1.73 M^2
EST. GFR  (NON AFRICAN AMERICAN): >60 ML/MIN/1.73 M^2
ESTIMATED AVG GLUCOSE: 111 MG/DL (ref 68–131)
GLUCOSE SERPL-MCNC: 102 MG/DL (ref 70–110)
HBA1C MFR BLD HPLC: 5.5 % (ref 4–5.6)
HCT VFR BLD AUTO: 38.6 % (ref 37–48.5)
HDLC SERPL-MCNC: 38 MG/DL (ref 40–75)
HDLC SERPL: 29.2 % (ref 20–50)
HGB BLD-MCNC: 12.1 G/DL (ref 12–16)
HYPOCHROMIA BLD QL SMEAR: ABNORMAL
LACTATE SERPL-SCNC: 1.4 MMOL/L (ref 0.5–2.2)
LDLC SERPL CALC-MCNC: 85.6 MG/DL (ref 63–159)
LYMPHOCYTES # BLD AUTO: 2.8 K/UL (ref 1–4.8)
LYMPHOCYTES NFR BLD: 14.9 % (ref 18–48)
MCH RBC QN AUTO: 22.7 PG (ref 27–31)
MCHC RBC AUTO-ENTMCNC: 31.3 G/DL (ref 32–36)
MCV RBC AUTO: 72 FL (ref 82–98)
MONOCYTES # BLD AUTO: 1.5 K/UL (ref 0.3–1)
MONOCYTES NFR BLD: 8 % (ref 4–15)
NEUTROPHILS # BLD AUTO: 14.2 K/UL (ref 1.8–7.7)
NEUTROPHILS NFR BLD: 77 % (ref 38–73)
NONHDLC SERPL-MCNC: 92 MG/DL
PLATELET # BLD AUTO: 295 K/UL (ref 150–350)
PMV BLD AUTO: 9.4 FL (ref 9.2–12.9)
POTASSIUM SERPL-SCNC: 4.1 MMOL/L (ref 3.5–5.1)
RBC # BLD AUTO: 5.33 M/UL (ref 4–5.4)
SODIUM SERPL-SCNC: 137 MMOL/L (ref 136–145)
TRIGL SERPL-MCNC: 32 MG/DL (ref 30–150)
WBC # BLD AUTO: 18.53 K/UL (ref 3.9–12.7)

## 2019-04-07 PROCEDURE — 63600175 PHARM REV CODE 636 W HCPCS: Performed by: HOSPITALIST

## 2019-04-07 PROCEDURE — 99222 1ST HOSP IP/OBS MODERATE 55: CPT | Mod: ,,, | Performed by: UROLOGY

## 2019-04-07 PROCEDURE — 25000003 PHARM REV CODE 250: Performed by: HOSPITALIST

## 2019-04-07 PROCEDURE — 36415 COLL VENOUS BLD VENIPUNCTURE: CPT

## 2019-04-07 PROCEDURE — 25000003 PHARM REV CODE 250: Performed by: PHYSICIAN ASSISTANT

## 2019-04-07 PROCEDURE — 83605 ASSAY OF LACTIC ACID: CPT

## 2019-04-07 PROCEDURE — 80061 LIPID PANEL: CPT

## 2019-04-07 PROCEDURE — 99233 PR SUBSEQUENT HOSPITAL CARE,LEVL III: ICD-10-PCS | Mod: ,,, | Performed by: HOSPITALIST

## 2019-04-07 PROCEDURE — 63600175 PHARM REV CODE 636 W HCPCS: Performed by: PHYSICIAN ASSISTANT

## 2019-04-07 PROCEDURE — 83036 HEMOGLOBIN GLYCOSYLATED A1C: CPT

## 2019-04-07 PROCEDURE — 63600175 PHARM REV CODE 636 W HCPCS: Performed by: EMERGENCY MEDICINE

## 2019-04-07 PROCEDURE — 99291 PR CRITICAL CARE, E/M 30-74 MINUTES: ICD-10-PCS | Mod: ,,, | Performed by: PHYSICIAN ASSISTANT

## 2019-04-07 PROCEDURE — 11000001 HC ACUTE MED/SURG PRIVATE ROOM

## 2019-04-07 PROCEDURE — 99291 CRITICAL CARE FIRST HOUR: CPT | Mod: ,,, | Performed by: PHYSICIAN ASSISTANT

## 2019-04-07 PROCEDURE — 94761 N-INVAS EAR/PLS OXIMETRY MLT: CPT

## 2019-04-07 PROCEDURE — 80048 BASIC METABOLIC PNL TOTAL CA: CPT

## 2019-04-07 PROCEDURE — 99233 SBSQ HOSP IP/OBS HIGH 50: CPT | Mod: ,,, | Performed by: HOSPITALIST

## 2019-04-07 PROCEDURE — 25000003 PHARM REV CODE 250: Performed by: EMERGENCY MEDICINE

## 2019-04-07 PROCEDURE — 85025 COMPLETE CBC W/AUTO DIFF WBC: CPT

## 2019-04-07 PROCEDURE — 99222 PR INITIAL HOSPITAL CARE,LEVL II: ICD-10-PCS | Mod: ,,, | Performed by: UROLOGY

## 2019-04-07 RX ORDER — HYDROMORPHONE HYDROCHLORIDE 1 MG/ML
0.5 INJECTION, SOLUTION INTRAMUSCULAR; INTRAVENOUS; SUBCUTANEOUS EVERY 4 HOURS PRN
Status: DISCONTINUED | OUTPATIENT
Start: 2019-04-07 | End: 2019-04-10 | Stop reason: HOSPADM

## 2019-04-07 RX ORDER — HYDRALAZINE HYDROCHLORIDE 20 MG/ML
15 INJECTION INTRAMUSCULAR; INTRAVENOUS EVERY 4 HOURS PRN
Status: DISCONTINUED | OUTPATIENT
Start: 2019-04-07 | End: 2019-04-10 | Stop reason: HOSPADM

## 2019-04-07 RX ORDER — HYDROMORPHONE HYDROCHLORIDE 1 MG/ML
0.5 INJECTION, SOLUTION INTRAMUSCULAR; INTRAVENOUS; SUBCUTANEOUS EVERY 6 HOURS PRN
Status: DISCONTINUED | OUTPATIENT
Start: 2019-04-07 | End: 2019-04-07

## 2019-04-07 RX ORDER — ACETAMINOPHEN 325 MG/1
650 TABLET ORAL EVERY 6 HOURS PRN
Status: DISCONTINUED | OUTPATIENT
Start: 2019-04-07 | End: 2019-04-09

## 2019-04-07 RX ORDER — HYDROCODONE BITARTRATE AND ACETAMINOPHEN 5; 325 MG/1; MG/1
1 TABLET ORAL EVERY 6 HOURS PRN
Status: DISCONTINUED | OUTPATIENT
Start: 2019-04-07 | End: 2019-04-09

## 2019-04-07 RX ORDER — L. ACIDOPHILUS/L.BULGARICUS 100MM CELL
1 GRANULES IN PACKET (EA) ORAL 2 TIMES DAILY
Status: DISCONTINUED | OUTPATIENT
Start: 2019-04-07 | End: 2019-04-10 | Stop reason: HOSPADM

## 2019-04-07 RX ADMIN — VANCOMYCIN HYDROCHLORIDE 1000 MG: 1 INJECTION, POWDER, FOR SOLUTION INTRAVENOUS at 10:04

## 2019-04-07 RX ADMIN — PIPERACILLIN AND TAZOBACTAM 4.5 G: 4; .5 INJECTION, POWDER, LYOPHILIZED, FOR SOLUTION INTRAVENOUS; PARENTERAL at 06:04

## 2019-04-07 RX ADMIN — SIMETHICONE 40 MG: 20 SUSPENSION/ DROPS ORAL at 04:04

## 2019-04-07 RX ADMIN — HYDRALAZINE HYDROCHLORIDE 15 MG: 20 INJECTION INTRAMUSCULAR; INTRAVENOUS at 09:04

## 2019-04-07 RX ADMIN — PIPERACILLIN AND TAZOBACTAM 4.5 G: 4; .5 INJECTION, POWDER, LYOPHILIZED, FOR SOLUTION INTRAVENOUS; PARENTERAL at 08:04

## 2019-04-07 RX ADMIN — HYDROMORPHONE HYDROCHLORIDE 1 MG: 1 INJECTION, SOLUTION INTRAMUSCULAR; INTRAVENOUS; SUBCUTANEOUS at 12:04

## 2019-04-07 RX ADMIN — HYDROMORPHONE HYDROCHLORIDE 0.5 MG: 1 INJECTION, SOLUTION INTRAMUSCULAR; INTRAVENOUS; SUBCUTANEOUS at 09:04

## 2019-04-07 RX ADMIN — LACTOBACILLUS ACIDOPHILUS / LACTOBACILLUS BULGARICUS 1 EACH: 100 MILLION CFU STRENGTH GRANULES at 08:04

## 2019-04-07 RX ADMIN — LACTOBACILLUS ACIDOPHILUS / LACTOBACILLUS BULGARICUS 1 EACH: 100 MILLION CFU STRENGTH GRANULES at 09:04

## 2019-04-07 RX ADMIN — SODIUM CHLORIDE: 0.9 INJECTION, SOLUTION INTRAVENOUS at 02:04

## 2019-04-07 RX ADMIN — SODIUM CHLORIDE: 0.9 INJECTION, SOLUTION INTRAVENOUS at 10:04

## 2019-04-07 RX ADMIN — HEPARIN SODIUM 5000 UNITS: 5000 INJECTION, SOLUTION INTRAVENOUS; SUBCUTANEOUS at 02:04

## 2019-04-07 RX ADMIN — PIPERACILLIN AND TAZOBACTAM 4.5 G: 4; .5 INJECTION, POWDER, LYOPHILIZED, FOR SOLUTION INTRAVENOUS; PARENTERAL at 12:04

## 2019-04-07 RX ADMIN — SODIUM CHLORIDE: 0.9 INJECTION, SOLUTION INTRAVENOUS at 04:04

## 2019-04-07 RX ADMIN — HEPARIN SODIUM 5000 UNITS: 5000 INJECTION, SOLUTION INTRAVENOUS; SUBCUTANEOUS at 05:04

## 2019-04-07 RX ADMIN — HEPARIN SODIUM 5000 UNITS: 5000 INJECTION, SOLUTION INTRAVENOUS; SUBCUTANEOUS at 09:04

## 2019-04-07 RX ADMIN — SIMETHICONE 40 MG: 20 SUSPENSION/ DROPS ORAL at 10:04

## 2019-04-07 RX ADMIN — HYDROCODONE BITARTRATE AND ACETAMINOPHEN 1 TABLET: 5; 325 TABLET ORAL at 03:04

## 2019-04-07 RX ADMIN — SIMETHICONE 40 MG: 20 SUSPENSION/ DROPS ORAL at 05:04

## 2019-04-07 RX ADMIN — PIPERACILLIN AND TAZOBACTAM 4.5 G: 4; .5 INJECTION, POWDER, LYOPHILIZED, FOR SOLUTION INTRAVENOUS; PARENTERAL at 02:04

## 2019-04-07 NOTE — HPI
"Per Winnie Thomas PA-C:    "Ms. Pastora Cota is a 53 y.o. female, with PMH of HTN, and primary hyperparathyroidism (w/ h/o goiter), who presented to [facility] ED on 4/6/19 2/2 abdominal pain. She states she ate a yogurt this morning and felt fine all day. She had noted increased urination during the day without dysuria, urgency, post-void pressure, or flank pain. She states she had a bowel movement ~1 PM, which was small and formed and non-bloody. After that time, she began to note increasing diffuse abdominal pain. She denied associated fever, nausea, vomiting and stated she had not been passing gas. She presented to the ED, where imaging showed colitis, as well as fluid and wall thickening of the small bowel lops without transition point, and diffuse wall thickening of the large bowel loops.; all suggestive of enterocolitis. There was associated stranding and inflammatory changes throughout the mesentery without free air or pneumatosis. A lactic acid was most mildly elevated at 2.3. She was given IV fluids, and will be admitted to inpatient status."  "

## 2019-04-07 NOTE — ASSESSMENT & PLAN NOTE
- Ms. Pastora Cota is admitted to inpatient status   - she had acute onset diffuse abdominal pain today  - ED imaging shows diffuse wall thickening of the large bowel loops and fluid distention and wall thickening of the small bowel loops without transition point  - NPO  - PRN pain meds   - NG tube is started w/ N/V  - continue antibiotics

## 2019-04-07 NOTE — CONSULTS
[facility]  Urology  Consult Note    Patient Name: aPstora Cota  MRN: [id number]  Admission Date: 4/6/2019  Hospital Length of Stay: 1   Code Status: Full Code   Attending Provider: Rachid Myers MD   Consulting Provider: Brian Wilson MD  Primary Care Physician: Kiko Koch MD  Principal Problem:Colitis    Inpatient consult to Urology  Consult performed by: Brian Wilson MD  Consult ordered by: Rachid Myers MD          Subjective:     HPI:  Patient is a 53-year-old female admitted from the emergency room yesterday with abdominal pain.  Workup in the emergency room demonstrated colitis and she was admitted to the ICU for further observation.  Overnight she complained of difficulty urinating and a Falcon catheter was placed with over 600 cc of urine drained.  Prior to that she had only been able to void 100 cc in a bedpan.  She denies any prior history of urinary retention or any other urinary symptoms.  Overall she is feeling much better today.    Past Medical History:   Diagnosis Date    Abnormal Pap smear     repeat normal    Abnormal Pap smear of cervix     Abnormal Pap smear of vagina     Allergy     seasonal    AR (allergic rhinitis)     Hypercalcemia 9/10/2015    Hypertension     IGT (impaired glucose tolerance) 9/9/2014    Pneumonia 12/2016       Past Surgical History:   Procedure Laterality Date    ABDOMINAL SURGERY      CHOLECYSTECTOMY      1993    COLONOSCOPY N/A 12/27/2017    [facility] END[facility] (4TH FLR)    COLONOSCOPY N/A 9/24/2016    [facility] ENDO (4TH FLR)    COLPOSCOPY      DILATION AND CURETTAGE OF UTERUS      DRAINAGE N/A 12/28/2016    Performed by [facility] CATH LAB    DRAINAGE N/A 12/22/2016    Performed by [facility] CATH LAB    HYSTERECTOMY      2007 tahbso     OOPHORECTOMY      THORACENTESIS N/A 12/20/2016    Performed by [facility]  Provider at [facility] beds at [facility] LAB       Review of patient's allergies indicates:   Allergen Reactions    Sulfa (sulfonamide antibiotics) Hives and Edema       Family History     Problem Relation (Age of Onset)    Breast cancer Sister (44)    Diabetes Mother    Glaucoma Mother, Father    Hypertension Mother, Sister    No Known Problems Brother, Maternal Aunt, Maternal Uncle, Paternal Aunt, Paternal Uncle, Maternal Grandmother, Maternal Grandfather, Paternal Grandmother, Paternal Grandfather          Tobacco Use    Smoking status: Never Smoker    Smokeless tobacco: Never Used   Substance and Sexual Activity    Alcohol use: Yes     Alcohol/week: 0.0 oz     Types: 1 Standard drinks or equivalent per week     Comment: socailly    Drug use: No    Sexual activity: Yes     Partners: Male     Birth control/protection: None, See Surgical Hx     Comment: ; hysterectomy       Review of Systems   Constitutional: Negative for appetite change, chills and fever.   HENT: Negative for hearing loss.    Eyes: Negative for photophobia and pain.   Respiratory: Negative for cough and shortness of breath.    Cardiovascular: Negative for chest pain and palpitations.   Gastrointestinal: Positive for abdominal distention and abdominal pain. Negative for diarrhea, nausea and vomiting.   Genitourinary: Positive for difficulty urinating.   Musculoskeletal: Negative for arthralgias, myalgias and neck pain.   Skin: Negative for color change and rash.   Neurological: Negative for dizziness, weakness, numbness and headaches.   Psychiatric/Behavioral: Negative for agitation and confusion.       Objective:     Temp:  [97.9 °F (36.6 °C)-100.6 °F (38.1 °C)] 100.3 °F (37.9 °C)  Pulse:  [] 100  Resp:  [16-33] 20  SpO2:  [95 %-100 %] 97 %  BP: (127-187)/(67-97) 141/72     Body mass index is 28.25 kg/m².    Date 04/07/19 0700 - 04/08/19 0659   Shift 1282-2727 7574-4871 7291-3032 24 Hour Total   INTAKE   Shift Total(mL/kg)       OUTPUT    Urine(mL/kg/hr) 470   470   Shift Total(mL/kg) 470(6.1)   470(6.1)   Weight (kg) 77 77 77 77          Drains     Drain                 Urethral Catheter 04/06/19 2100 Non-latex less than 1 day                Physical Exam   Constitutional: She is oriented to person, place, and time. She appears well-developed and well-nourished. No distress.   HENT:   Head: Normocephalic and atraumatic.   Eyes: Pupils are equal, round, and reactive to light.   Neck: Normal range of motion. Neck supple.   Cardiovascular: Normal rate and regular rhythm.    Pulmonary/Chest: Effort normal. No respiratory distress.   Genitourinary:   Genitourinary Comments: Falcon in place with clear yellow urine   Neurological: She is alert and oriented to person, place, and time.   Psychiatric: She has a normal mood and affect. Her behavior is normal. Thought content normal.       Significant Labs:    BMP:  Recent Labs   Lab 04/06/19  1432 04/07/19  0426    137   K 3.4* 4.1    107   CO2 23 20*   BUN 16 11   CREATININE 0.9 0.8   CALCIUM 10.8* 10.0       CBC:  Recent Labs   Lab 04/06/19  1432 04/07/19  0426   WBC 10.30 18.53*   HGB 12.7 12.1   HCT 39.7 38.6    295           Assessment and Plan:     Urine retention  -- Likely secondary to acute illness and pain  -- Will keep Falcon for today and plan for voiding trial tomorrow if continuing to improve clinically        VTE Risk Mitigation (From admission, onward)        Ordered     heparin (porcine) injection 5,000 Units  Every 8 hours      04/06/19 2046     IP VTE HIGH RISK PATIENT  Once      04/06/19 2046     Place sequential compression device  Until discontinued      04/06/19 2035          Thank you for your consult. I will follow-up with patient. Please contact us if you have any additional questions.    Brian Wilson MD  Urology  [facility]

## 2019-04-07 NOTE — ED NOTES
Pt AAOx4 and appropriate at this time. Respirations even and unlabored. No acute distress noted. Pt transferred to ICU with YU Holden.

## 2019-04-07 NOTE — ASSESSMENT & PLAN NOTE
-- Likely secondary to acute illness and pain  -- Will keep Falcon for today and plan for voiding trial tomorrow if continuing to improve clinically

## 2019-04-07 NOTE — ASSESSMENT & PLAN NOTE
"- CT of abdomen & pelvis with findings concerning for mesenteric ischemia"   - "Free fluid in the pelvis and nonspecific fluid throughout the mesentery. Some component of early bowel ischemia be difficult to exclude."   - "The mesenteric vessels are within normal limits."  - initial serum lactate mildly elevated at 2.3, repeat pending   - she is not a vasculopath, has no h/o or current arrhythmias, no valvular disease, no recent catheterizations, or cardiac surgeries, no h/ o PAD, not a HD patient, and no hernias.   - Discussed at length with General Surgery, Dr. Nogueira    - NPO   - IV fluids    - Prophylactic heparin dosing    - consult in AM   "

## 2019-04-07 NOTE — NURSING
No significant events this shift. Remains free from fall, injury, and skin breakdown.  VSS stable on RA and afebrile. Positions self independently. Pain controlled with PO PRN meds. Neuro checks WDL. TEDs/SCDs maintained.Tolerating ordered diet. IV site WNL.  Plan of care reviewed with patient and all questions answered. Bed low, locked w/ bed alarm on. Call light within reach. Purposeful rounding performed. No other complaints at this time.

## 2019-04-07 NOTE — ASSESSMENT & PLAN NOTE
-History noted  -Ca mildly elevated on admit and normal this morning  -holding home lasix for now.  -Monitor labs daily

## 2019-04-07 NOTE — ASSESSMENT & PLAN NOTE
-CT abd concerning for possible mesenteric ischemia, however she is not a known vasculopath.  -Check lipids and A1c to further assess risk factors.  -Await surgery consultation and treat as above for colitis.

## 2019-04-07 NOTE — HPI
Patient is a 53-year-old female admitted from the emergency room yesterday with abdominal pain.  Workup in the emergency room demonstrated colitis and she was admitted to the ICU for further observation.  Overnight she complained of difficulty urinating and a Falcon catheter was placed with over 600 cc of urine drained.  Prior to that she had only been able to void 100 cc in a bedpan.  She denies any prior history of urinary retention or any other urinary symptoms.  Overall she is feeling much better today.

## 2019-04-07 NOTE — NURSING
Pt is complaining of urinary retention. Pt able to void 100 cc on a bedpan. Falcon catheter inserted per order. Pt voided 650 ml after placement.

## 2019-04-07 NOTE — SUBJECTIVE & OBJECTIVE
Interval History: No acute events, but pain much improved after bain placed for urinary retention.  Did have fever after admission.  States was not having any infectious symptoms prior to onset of pain.  Has HTN but no HLD or tobacco use or significant family history of vascular disease.    Review of Systems   Constitutional: Negative for activity change, chills, diaphoresis and fatigue.   HENT: Negative for congestion, drooling and hearing loss.    Eyes: Negative for discharge.   Respiratory: Negative for apnea, chest tightness, shortness of breath and wheezing.    Cardiovascular: Negative for palpitations and leg swelling.   Gastrointestinal: Positive for abdominal pain. Negative for abdominal distention, constipation and diarrhea.   Endocrine: Negative for cold intolerance and heat intolerance.   Genitourinary: Positive for decreased urine volume and pelvic pain.   Musculoskeletal: Negative for arthralgias and gait problem.   Skin: Negative for rash.   Neurological: Negative for seizures, light-headedness and numbness.   Hematological: Negative for adenopathy.   Psychiatric/Behavioral: Negative for agitation and behavioral problems.     Objective:     Vital Signs (Most Recent):  Temp: 100.3 °F (37.9 °C) (04/07/19 0730)  Pulse: 100 (04/07/19 0803)  Resp: 20 (04/07/19 0803)  BP: (!) 175/83 (04/07/19 0803)  SpO2: 97 % (04/07/19 0803) Vital Signs (24h Range):  Temp:  [97.9 °F (36.6 °C)-100.6 °F (38.1 °C)] 100.3 °F (37.9 °C)  Pulse:  [] 100  Resp:  [16-33] 20  SpO2:  [95 %-100 %] 97 %  BP: (127-187)/(69-97) 175/83     Weight: 77 kg (169 lb 12.1 oz)  Body mass index is 28.25 kg/m².    Intake/Output Summary (Last 24 hours) at 4/7/2019 0808  Last data filed at 4/7/2019 0730  Gross per 24 hour   Intake 2850 ml   Output 1745 ml   Net 1105 ml      Physical Exam   Constitutional: She is oriented to person, place, and time. She appears well-developed and well-nourished.   HENT:   Head: Normocephalic and atraumatic.    Eyes: Pupils are equal, round, and reactive to light. EOM are normal.   Neck: Normal range of motion. Neck supple.   Cardiovascular: Normal rate, regular rhythm and normal heart sounds.   Pulmonary/Chest: Effort normal and breath sounds normal. No respiratory distress.   Abdominal: She exhibits no distension. There is tenderness.   Soft, mild diffuse TTP w/o rebound or guarding, present but greatly diminished bowel sounds.   Musculoskeletal: Normal range of motion. She exhibits no edema.   Neurological: She is oriented to person, place, and time. No cranial nerve deficit. Coordination normal.   Skin: Skin is warm and dry.   Psychiatric: She has a normal mood and affect. Her behavior is normal.   Vitals reviewed.      Significant Labs: All pertinent labs within the past 24 hours have been reviewed.    Significant Imaging: I have reviewed and interpreted all pertinent imaging results/findings within the past 24 hours.

## 2019-04-07 NOTE — PLAN OF CARE
04/07/19 1803   Discharge Assessment   Assessment Type Discharge Planning Assessment   Confirmed/corrected address and phone number on facesheet? Yes   Assessment information obtained from? Patient   Communicated expected length of stay with patient/caregiver yes   Prior to hospitilization cognitive status: Alert/Oriented   Prior to hospitalization functional status: Independent   Current cognitive status: Alert/Oriented   Current Functional Status: Independent   Is patient able to care for self after discharge? Yes   Patient's perception of discharge disposition admitted as an inpatient   Patient currently being followed by outpatient case management? No   Patient currently receives any other outside agency services? No   Do you have any problems affording any of your prescribed medications? TBD   Is the patient taking medications as prescribed? yes   Does the patient have transportation home? Yes   Does the patient receive services at [facility]? Yes   Discharge Plan A Home with family   Discharge Plan B Home with family   DME Needed Upon Discharge  none   Patient/Family in Agreement with Plan yes     RNCM met with patient at the bedside.      Patient is alert and oriented with no communication barriers.      Prior to admission patient was independent with ADLs. Patient denies the use of HH or DME .       Patients PCP is correct on the face sheet. Patient choice pharmacy is CORRECT      Patient denies a history of mental illness.         Patients family will transport him home at discharge.         No CM needs identified at this time.       CM team will continue to follow.

## 2019-04-07 NOTE — NURSING
Pt arrived to floor via wheelchair with YU Loredo and transferred to bed. AAO x4; stable condition. IVF started, SCDs applied, oriented to room, call light placed within reach, bed low and locked, and family at bedside. Pt complains of pain 3/10. Tele monitor in place. Falcon noted draining clear yellow urine to gravity.  No acute distress noted at this time. Will continue to monitor.

## 2019-04-07 NOTE — ASSESSMENT & PLAN NOTE
"- CT of abdomen & pelvis with findings concerning for mesenteric ischemia"   - "Free fluid in the pelvis and nonspecific fluid throughout the mesentery. Some component of early bowel ischemia be difficult to exclude."   - "The mesenteric vessels are within normal limits."  - initial serum lactate mildly elevated at 2.3, repeat pending   - Discussed at length with General Surgery, Dr. Nogueira    - NPO   - IV fluids    - Prophylactic heparin dosing    - consult in AM   "

## 2019-04-07 NOTE — CONSULTS
53yoBF admitted with abd pain which started shortly before presentation to the ER. No N&V, diarrhea. Feels better today, less pain.  S/P lap jina; hyst; ooph. Colonoscopy 2017 WNL(2016 exam showed colitis)  In NAD. Jver450.6  VSS Good UO.  Abd nondistended. Mod tenderness lower quads. No masses.  WBC 18.5  Hct 38.6  IMP: Nonacute abdomen          Observe on IV fluids, NPO, Zosyn

## 2019-04-07 NOTE — ASSESSMENT & PLAN NOTE
-Falcon placed and 600cc released followed by improvement in abd pain  -Urology consult pending.

## 2019-04-07 NOTE — H&P
"[facility]  History & Physical    Patient Name: Pastora Cota  MRN: [id number]  Admission Date: 4/6/2019  Attending Physician: Rachid Myers MD   Primary Care Provider: Kiko Koch MD         Patient information was obtained from patient, past medical records and ER records.     Subjective:     Principal Problem:Colitis    Chief Complaint:   Chief Complaint   Patient presents with    Abdominal Pain     + new onset of bilateral lower abdominal pains " while I was sitting down" around 10 minutes ago. Denies N/V. Pt reports having x 1 small BM PTA reported as "normal".         HPI: Ms. Pastora Cota is a 53 y.o. female, with PMH of HTN, and primary hyperparathyroidism (w/ h/o goiter), who presented to [facility] ED on 4/6/19 2/2 abdominal pain. She states she ate a yogurt this morning and felt fine all day. She had noted increased urination during the day without dysuria, urgency, post-void pressure, or flank pain. She states she had a bowel movement ~1 PM, which was small and formed and non-bloody. After that time, she began to note increasing diffuse abdominal pain. She denied associated fever, nausea, vomiting and stated she had not been passing gas. She presented to the ED, where imaging showed colitis, as well as fluid and wall thickening of the small bowel lops without transition point, and diffuse wall thickening of the large bowel loops.; all suggestive of enterocolitis. There was associated stranding and inflammatory changes throughout the mesentery without free air or pneumatosis. A lactic acid was most mildly elevated at 2.3. She was given IV fluids, and will be admitted to inpatient status.     Past Medical History:   Diagnosis Date    Abnormal Pap smear     repeat normal    Abnormal Pap smear of cervix     Abnormal Pap smear of vagina     Allergy     seasonal    AR (allergic rhinitis)     Hypercalcemia 9/10/2015    Hypertension  "    IGT (impaired glucose tolerance) 9/9/2014    Pneumonia 12/2016       Past Surgical History:   Procedure Laterality Date    ABDOMINAL SURGERY      CHOLECYSTECTOMY      1993    COLONOSCOPY N/A 12/27/2017    Performed by [facility] at [facility] (4TH F[facility])    COLONOSCOPY N/A 9/24/2016    Performed by [facility] at [facility] (4TH FLR)    COLPOSCOPY      DILATION AND CURETTAGE OF UTERUS      DRAINAGE N/A 12/28/2016    Performed by [facility] at [facility] CATH LAB    DRAINAGE N/A 12/22/2016    Performed by [facility] Provider at [facility] CATH LAB    HYSTERECTOMY      2007 tahbso     OOPHORECTOMY      THORACENTESIS N/A 12/20/2016    Performed by [facility] Diagnostic Provider at [facility] CATH LAB       Review of patient's allergies indicates:   Allergen Reactions    Sulfa (sulfonamide antibiotics) Hives and Edema       No current facility-administered medications on file prior to encounter.      Current Outpatient Medications on File Prior to Encounter   Medication Sig    amLODIPine (NORVASC) 10 MG tablet Take 1 tablet (10 mg total) by mouth once daily.    cetirizine (ZYRTEC) 10 MG tablet Take 10 mg by mouth once daily.    cholecalciferol, vitamin D3, 50,000 unit capsule Take 1 capsule (50,000 Units total) by mouth once a week.    furosemide (LASIX) 20 MG tablet Take 1 tablet (20 mg total) by mouth once daily.    losartan (COZAAR) 100 MG tablet Take 1 tablet (100 mg total) by mouth once daily.    cyclobenzaprine (FLEXERIL) 5 MG tablet 1-2 tablets po prn muscle spasm/stiffness. Likely to cause sedation    epinephrine (EPIPEN) 0.3 mg/0.3 mL AtIn Inject 0.3 mLs (0.3 mg total) into the muscle as needed (extreme swelling, difficulty swallowing, voice changes or trouble breathing).    hydrocortisone butyrate 0.1 % Crea cream APPLY  CREAM TO AFFECTED AREA OF FACE TWICE DAILY (Patient taking differently: APPLY  CREAM TO AFFECTED AREA OF FACE TWICE DAILY as needed)    hydrOXYzine HCl (ATARAX) 25 MG  tablet Take 1 tablet (25 mg total) by mouth 3 (three) times daily as needed for Itching.    ibuprofen (ADVIL,MOTRIN) 600 MG tablet Take 1 tablet (600 mg total) by mouth every 6 (six) hours as needed for Pain or Temperature greater than (100.4).    triamcinolone acetonide 0.1% (KENALOG) 0.1 % cream AAA bid (Patient taking differently: Apply topically. Apply to chest and back as needed)     Family History     Problem Relation (Age of Onset)    Breast cancer Sister (44)    Diabetes Mother    Glaucoma Mother, Father    Hypertension Mother, Sister    No Known Problems Brother, Maternal Aunt, Maternal Uncle, Paternal Aunt, Paternal Uncle, Maternal Grandmother, Maternal Grandfather, Paternal Grandmother, Paternal Grandfather        Tobacco Use    Smoking status: Never Smoker    Smokeless tobacco: Never Used   Substance and Sexual Activity    Alcohol use: Yes     Alcohol/week: 0.0 oz     Types: 1 Standard drinks or equivalent per week     Comment: socailly    Drug use: No    Sexual activity: Yes     Partners: Male     Birth control/protection: None, See Surgical Hx     Comment: ; hysterectomy     Review of Systems   Constitutional: Negative for activity change, appetite change, chills, diaphoresis and fever.   Respiratory: Negative for cough, shortness of breath and wheezing.    Cardiovascular: Negative for chest pain and palpitations.   Gastrointestinal: Positive for abdominal pain and constipation (one small formed BM today). Negative for abdominal distention, diarrhea, nausea and vomiting.   Genitourinary: Positive for frequency. Negative for dysuria, flank pain, hematuria and urgency.   Skin: Negative for color change and pallor.   Neurological: Negative for dizziness, light-headedness and headaches.   Psychiatric/Behavioral: Negative for behavioral problems, confusion and decreased concentration.     Objective:     Vital Signs (Most Recent):  Temp: 98.5 °F (36.9 °C) (04/06/19 2020)  Pulse: 92 (04/06/19  1937)  Resp: (!) 23 (04/06/19 1958)  BP: (!) 157/79 (04/06/19 1937)  SpO2: 96 % (04/06/19 1958) Vital Signs (24h Range):  Temp:  [97.9 °F (36.6 °C)-98.5 °F (36.9 °C)] 98.5 °F (36.9 °C)  Pulse:  [] 92  Resp:  [16-23] 23  SpO2:  [95 %-100 %] 96 %  BP: (127-175)/(69-95) 157/79     Weight: 76.7 kg (169 lb)  Body mass index is 28.12 kg/m².    Physical Exam   Constitutional: She is oriented to person, place, and time. She appears well-developed and well-nourished. No distress.   Comfortable appearing female, on bedpan upon my arrival. Moved with ease so I could remove the bedpan and complete exam.    HENT:   Head: Normocephalic and atraumatic.   Eyes: Pupils are equal, round, and reactive to light. Conjunctivae and EOM are normal. No scleral icterus.   Neck: Normal range of motion. Neck supple. No tracheal deviation present.   Cardiovascular: Normal rate, regular rhythm, normal heart sounds and intact distal pulses. Exam reveals no gallop and no friction rub.   No murmur heard.  Pulmonary/Chest: Effort normal and breath sounds normal. No stridor. No respiratory distress. She has no wheezes. She has no rales.   Abdominal: Soft. Bowel sounds are normal. She exhibits no distension and no mass. There is tenderness (Diffuse, with maximum tenderness acorss the b/l lower quadrants and suprapubic area of the abdomen. ). There is guarding (voluntary).   Neurological: She is alert and oriented to person, place, and time.   Skin: Skin is warm and dry. She is not diaphoretic. No pallor.   Psychiatric: She has a normal mood and affect. Her behavior is normal. Judgment and thought content normal.   Nursing note and vitals reviewed.        CRANIAL NERVES     CN III, IV, VI   Pupils are equal, round, and reactive to light.  Extraocular motions are normal.        Significant Labs:   BMP:   Recent Labs   Lab 04/06/19  1432   *      K 3.4*      CO2 23   BUN 16   CREATININE 0.9   CALCIUM 10.8*     CBC:   Recent Labs    Lab 04/06/19  1432   WBC 10.30   HGB 12.7   HCT 39.7        CMP:   Recent Labs   Lab 04/06/19  1432      K 3.4*      CO2 23   *   BUN 16   CREATININE 0.9   CALCIUM 10.8*   PROT 8.3  8.3   ALBUMIN 4.1  4.1   BILITOT 0.3  0.3   ALKPHOS 117  117   AST 24  24   ALT 30  30   ANIONGAP 11   EGFRNONAA >60     Lactic Acid:   Recent Labs   Lab 04/06/19  1804   LACTATE 2.3*     Urine Culture: No results for input(s): LABURIN in the last 48 hours.  Urine Studies:   Recent Labs   Lab 04/06/19  1808   COLORU Yellow   APPEARANCEUA Clear   PHUR 7.0   SPECGRAV 1.010   PROTEINUA Negative   GLUCUA Negative   KETONESU Negative   BILIRUBINUA Negative   OCCULTUA Negative   NITRITE Negative   UROBILINOGEN Negative   LEUKOCYTESUR Negative     All pertinent labs within the past 24 hours have been reviewed.    Significant Imaging: I have reviewed all pertinent imaging results/findings within the past 24 hours.   Imaging Results          US Mesenteric Ischemia Study (xpd) (Final result)  Result time 04/06/19 20:11:43    Final result by Vicenta Medeiros MD (04/06/19 20:11:43)                 Impression:      As above described.      Electronically signed by: Vicenta Medeiros  Date:    04/06/2019  Time:    20:11             Narrative:    EXAMINATION:  Ultrasound mesenteric ischemia study    CLINICAL HISTORY:  Evaluate for mesenteric ischemia;    TECHNIQUE:  Real-time ultrasound of the abdomen was performed to evaluate the mesentery.    COMPARISON:  None.    FINDINGS:  Examination is nondiagnostic secondary to excessive overlying bowel gas.                                CT Abdomen Pelvis With Contrast (Final result)  Result time 04/06/19 18:05:27    Final result by Carlos Maravilla MD (04/06/19 18:05:27)                 Impression:      No CT findings of acute diverticulitis.    Fluid distention and wall thickening involving the small and large bowel loops, suggestive of enterocolitis.  Free fluid in the pelvis  and nonspecific fluid throughout the mesentery.  Some component of early bowel ischemia be difficult to entirely exclude.  Correlation with serum lactate levels is suggested.    Low-attenuation wedge-shaped areas in the lower pole of the left kidney, most consistent with renal infarctions.  Renal vein and the renal arteries remain patent.  Correlation with echocardiogram may be obtained.    Status post cholecystectomy.  No abnormality in the gallbladder fossa.    Poor definition of the uterus with suspected fluid within the expected location of the endometrial canal patient reported history of hysterectomy.  Pelvic ultrasound may be attempted for further evaluation.    Airspace opacities in the bilateral lower lobes.  Follow-up to resolution is suggested.    Additional findings as above.    This report was flagged in Epic as abnormal.      Electronically signed by: Carlos Maravilla MD  Date:    04/06/2019  Time:    18:05             Narrative:    EXAMINATION:  CT ABDOMEN PELVIS WITH CONTRAST    CLINICAL HISTORY:  LLQ pain, suspect diverticulitis;    TECHNIQUE:  Low dose axial images, sagittal and coronal reformations were obtained from the lung bases to the pubic symphysis following the IV administration of 75 mL of Omnipaque 350 .  Oral contrast was not given.    COMPARISON:  None.    FINDINGS:  There are no pleural effusions.  There is no evidence of a pneumothorax.  There are airspace opacities in the bilateral lower lobes.    The heart is unremarkable.  There are calcifications in the abdominal aorta.  There also calcifications involving the ostia of the aortic branch vessels including the renal arteries.  The portal veins are unremarkable.  The mesenteric vessels are within normal limits.  The renal veins remain patent.  The IVC and the remainder of the venous structures are within normal limits.  There is no evidence of lymphadenopathy in the abdomen or pelvis.    The esophagus, stomach, and duodenum are within  normal limits.  There is fluid distention and wall thickening involving the small bowel loops.  No transition point is identified.  The appendix is not consistently identified.  There are no secondary findings of acute appendicitis.  There is diffuse wall thickening involving the large bowel loops.    The liver is unremarkable.  The patient is status post cholecystectomy.  There is no abnormality in the gallbladder fossa.  The biliary tree is unremarkable.  The spleen is unremarkable.  The pancreas is within normal limits.    The adrenal glands are unremarkable.  The right kidney is unremarkable.  There is a 3 cm simple cyst in the upper pole of the left kidney.  There are multiple peripheral wedge-shaped low-attenuation regions in the lower pole of the left kidney.  The ureters are within normal limits.  The urinary bladder is within normal limits.    There is poor definition of the uterus.  There is a 4.9 x 3.0 cm fluid intensity within the endometrium.  The adnexal structures are poorly delineated.    There is small amount of free fluid in the pelvis.  There also scattered areas of free fluid throughout the mesentery.  There is marked stranding and inflammatory changes throughout the mesentery.  There is no evidence of free air.  There is no evidence of pneumatosis.    The psoas margins are unremarkable.  The abdominal wall is within normal limits.  There are degenerative changes in the osseous structures.                                 Assessment/Plan:     * Colitis  - Ms. Pastora Cota is admitted to inpatient status   - she had acute onset diffuse abdominal pain today  - ED imaging shows diffuse wall thickening of the large bowel loops and fluid distention and wall thickening of the small bowel loops without transition point  - NPO  - PRN pain meds   - NG tube is started w/ N/V  - continue antibiotics       Abnormal abdominal CT scan  - CT of abdomen & pelvis with findings concerning for mesenteric  "ischemia"   - "Free fluid in the pelvis and nonspecific fluid throughout the mesentery. Some component of early bowel ischemia be difficult to exclude."   - "The mesenteric vessels are within normal limits."  - initial serum lactate mildly elevated at 2.3, repeat pending   - Discussed at length with General Surgery, Dr. Nogueira    - NPO   - IV fluids    - Prophylactic heparin dosing    - consult in AM     Hypertension  - hold home meds at present  - BP mildly elevated upon arrival   - seems BP is generally mildly to moderately elevated   BP Readings from Last 3 Encounters:   04/06/19 (!) 157/79   04/03/19 (!) 186/97   03/07/19 (!) 148/92           VTE Risk Mitigation (From admission, onward)        Ordered     heparin (porcine) injection 5,000 Units  Every 8 hours      04/06/19 2046     IP VTE HIGH RISK PATIENT  Once      04/06/19 2046     Place sequential compression device  Until discontinued      04/06/19 2035             Winnie Thomas PA-C  Department of Hospital Medicine   [facility]  "

## 2019-04-07 NOTE — PROGRESS NOTES
[facility] Hospital Medicine  Progress Note    Patient Name: Pastora Cota  MRN: [id number]  Patient Class: IP- Inpatient   Admission Date: 4/6/2019  Length of Stay: 1 days  Attending Physician: Rachid Myers MD  Primary Care Provider: Kiko Koch MD        Subjective:     Principal Problem:Colitis    HPI:  Ms. Pastora Cota is a 53 y.o. female, with PMH of HTN, and primary hyperparathyroidism (w/ h/o goiter), who presented to [facility] ED on 4/6/19 2/2 abdominal pain. She states she ate a yogurt this morning and felt fine all day. She had noted increased urination during the day without dysuria, urgency, post-void pressure, or flank pain. She states she had a bowel movement ~1 PM, which was small and formed and non-bloody. After that time, she began to note increasing diffuse abdominal pain. She denied associated fever, nausea, vomiting and stated she had not been passing gas. She presented to the ED, where imaging showed colitis, as well as fluid and wall thickening of the small bowel lops without transition point, and diffuse wall thickening of the large bowel loops.; all suggestive of enterocolitis. There was associated stranding and inflammatory changes throughout the mesentery without free air or pneumatosis. A lactic acid was most mildly elevated at 2.3. She was given IV fluids, and will be admitted to inpatient status.     Hospital Course:  No notes on file    Interval History: No acute events, but pain much improved after bain placed for urinary retention.  Did have fever after admission.  States was not having any infectious symptoms prior to onset of pain.  Has HTN but no HLD or tobacco use or significant family history of vascular disease.    Review of Systems   Constitutional: Negative for activity change, chills, diaphoresis and fatigue.   HENT: Negative for congestion, drooling and hearing loss.    Eyes: Negative for discharge.   Respiratory:  Negative for apnea, chest tightness, shortness of breath and wheezing.    Cardiovascular: Negative for palpitations and leg swelling.   Gastrointestinal: Positive for abdominal pain. Negative for abdominal distention, constipation and diarrhea.   Endocrine: Negative for cold intolerance and heat intolerance.   Genitourinary: Positive for decreased urine volume and pelvic pain.   Musculoskeletal: Negative for arthralgias and gait problem.   Skin: Negative for rash.   Neurological: Negative for seizures, light-headedness and numbness.   Hematological: Negative for adenopathy.   Psychiatric/Behavioral: Negative for agitation and behavioral problems.     Objective:     Vital Signs (Most Recent):  Temp: 100.3 °F (37.9 °C) (04/07/19 0730)  Pulse: 100 (04/07/19 0803)  Resp: 20 (04/07/19 0803)  BP: (!) 175/83 (04/07/19 0803)  SpO2: 97 % (04/07/19 0803) Vital Signs (24h Range):  Temp:  [97.9 °F (36.6 °C)-100.6 °F (38.1 °C)] 100.3 °F (37.9 °C)  Pulse:  [] 100  Resp:  [16-33] 20  SpO2:  [95 %-100 %] 97 %  BP: (127-187)/(69-97) 175/83     Weight: 77 kg (169 lb 12.1 oz)  Body mass index is 28.25 kg/m².    Intake/Output Summary (Last 24 hours) at 4/7/2019 0808  Last data filed at 4/7/2019 0730  Gross per 24 hour   Intake 2850 ml   Output 1745 ml   Net 1105 ml      Physical Exam   Constitutional: She is oriented to person, place, and time. She appears well-developed and well-nourished.   HENT:   Head: Normocephalic and atraumatic.   Eyes: Pupils are equal, round, and reactive to light. EOM are normal.   Neck: Normal range of motion. Neck supple.   Cardiovascular: Normal rate, regular rhythm and normal heart sounds.   Pulmonary/Chest: Effort normal and breath sounds normal. No respiratory distress.   Abdominal: She exhibits no distension. There is tenderness.   Soft, mild diffuse TTP w/o rebound or guarding, present but greatly diminished bowel sounds.   Musculoskeletal: Normal range of motion. She exhibits no edema.    Neurological: She is oriented to person, place, and time. No cranial nerve deficit. Coordination normal.   Skin: Skin is warm and dry.   Psychiatric: She has a normal mood and affect. Her behavior is normal.   Vitals reviewed.      Significant Labs: All pertinent labs within the past 24 hours have been reviewed.    Significant Imaging: I have reviewed and interpreted all pertinent imaging results/findings within the past 24 hours.    Assessment/Plan:      * Colitis  -Mrs. Cota is admitted to inpatient status   -she had acute onset diffuse abdominal pain without preceding fever, diarrhea or other signs of infection.  CT abdomen concerning for enterocolitis vs mesenteric ischemia and has been diagnosed with severe sepsis (fever, tachycardia, leukocytosis, lactic acidosis).    -Pain much improved after placement of bain for urinary retention  -Repeat lactic acid this morning  -Check stool studies.  -Continue IV antibiotics and fluids.  Start probiotic.  -Continue IV dilaudid for analgesia  -Await surgery consultation.  -Possibly to the floor this afternoon if she remains stable.    Mesenteric ischemia  -CT abd concerning for possible mesenteric ischemia, however she is not a known vasculopath.  -Check lipids and A1c to further assess risk factors.  -Await surgery consultation and treat as above for colitis.      Severe sepsis  -she has fever, tachycardia, leukocytosis, lactic acidosis and source of infection  -Treat as above for colitis      Lactic acidosis  -As above for colitis      Urine retention  -Bain placed and 600cc released followed by improvement in abd pain  -Urology consult pending.      Hypokalemia  -K normal this morning  -Repeat bmp and mag in AM      Primary hyperparathyroidism  -History noted  -Ca mildly elevated on admit and normal this morning  -holding home lasix for now.  -Monitor labs daily    Hyperlipidemia  -History noted  -Not on treatment at this time      Hypertension  -BP remains  elevated  -holding home norvasc and losartan while NPO  -Will add PRN hydralazine      VTE Risk Mitigation (From admission, onward)        Ordered     heparin (porcine) injection 5,000 Units  Every 8 hours      04/06/19 2046     IP VTE HIGH RISK PATIENT  Once      04/06/19 2046     Place sequential compression device  Until discontinued      04/06/19 2035            Rachid Myers MD  Department of Hospital Medicine   [facility]

## 2019-04-07 NOTE — ASSESSMENT & PLAN NOTE
- hold home meds at present  - BP mildly elevated upon arrival   - seems BP is generally mildly to moderately elevated   BP Readings from Last 3 Encounters:   04/06/19 (!) 157/79   04/03/19 (!) 186/97   03/07/19 (!) 148/92

## 2019-04-07 NOTE — ASSESSMENT & PLAN NOTE
-she has fever, tachycardia, leukocytosis, lactic acidosis and source of infection  -Treat as above for colitis

## 2019-04-07 NOTE — EICU
New admit    52 y/o F HTN, HFpEF, presented with diffuse abdominal pain.  Last meal was breakfast when she ate yogurt and has had a bowel movement that was formed and non-bloody. No fever, nausea nor vomiting. Abdominal imaging consistent with enterocolitis.    Cholecystectomy 1993  TAHBSO 2007     Camera assessment:  Patient not in acute distress with family at bedside.  She states pain has improved but would still occasionally experience sharp pains    Telemetry:  /86  HR 92    Data:  Lactate 2.7  WBC 10, H/H 12.7/39.7, plt 349  PT 11, INR 1, aPTT 24.6  Na 141, K 3.4, creatinine 0.9  UA negative    · Enterocolitis given fluids and antibiotics.  No surgical indication currently  · Hypokalemia, will replace  · Hydralazine 10 mg IV q 6 prn ordered while patient is NPO

## 2019-04-07 NOTE — SUBJECTIVE & OBJECTIVE
Past Medical History:   Diagnosis Date    Abnormal Pap smear     repeat normal    Abnormal Pap smear of cervix     Abnormal Pap smear of vagina     Allergy     seasonal    AR (allergic rhinitis)     Hypercalcemia 9/10/2015    Hypertension     IGT (impaired glucose tolerance) 9/9/2014    Pneumonia 12/2016       Past Surgical History:   Procedure Laterality Date    ABDOMINAL SURGERY      CHOLECYSTECTOMY      1993    COLONOSCOPY N/A 12/27/2017    Performed by Johnny Clayton MD at [facility] (4TH FLR)    COLONOSCOPY N/A 9/24/2016    Performed by Johnny Clayton MD at [facility] (4TH FLR)    COLPOSCOPY      DILATION AND CURETTAGE OF UTERUS      DRAINAGE N/A 12/28/2016    Performed by [facility] at [facility] CATH LAB    DRAINAGE N/A 12/22/2016    Performed by [facility] C[facility]    HYSTERECTOMY      2007 tahbso     OOPHORECTOMY      THORACENTESIS N/A 12/20/2016    Performed by [facility] Diagnostic Provider at [facility] CATH LAB       Review of patient's allergies indicates:   Allergen Reactions    Sulfa (sulfonamide antibiotics) Hives and Edema       Family History     Problem Relation (Age of Onset)    Breast cancer Sister (44)    Diabetes Mother    Glaucoma Mother, Father    Hypertension Mother, Sister    No Known Problems Brother, Maternal Aunt, Maternal Uncle, Paternal Aunt, Paternal Uncle, Maternal Grandmother, Maternal Grandfather, Paternal Grandmother, Paternal Grandfather          Tobacco Use    Smoking status: Never Smoker    Smokeless tobacco: Never Used   Substance and Sexual Activity    Alcohol use: Yes     Alcohol/week: 0.0 oz     Types: 1 Standard drinks or equivalent per week     Comment: socailly    Drug use: No    Sexual activity: Yes     Partners: Male     Birth control/protection: None, See Surgical Hx     Comment: ; hysterectomy       Review of Systems   Constitutional: Negative for appetite change, chills and fever.   HENT: Negative for hearing loss.    Eyes:  Negative for photophobia and pain.   Respiratory: Negative for cough and shortness of breath.    Cardiovascular: Negative for chest pain and palpitations.   Gastrointestinal: Positive for abdominal distention and abdominal pain. Negative for diarrhea, nausea and vomiting.   Genitourinary: Positive for difficulty urinating.   Musculoskeletal: Negative for arthralgias, myalgias and neck pain.   Skin: Negative for color change and rash.   Neurological: Negative for dizziness, weakness, numbness and headaches.   Psychiatric/Behavioral: Negative for agitation and confusion.       Objective:     Temp:  [97.9 °F (36.6 °C)-100.6 °F (38.1 °C)] 100.3 °F (37.9 °C)  Pulse:  [] 100  Resp:  [16-33] 20  SpO2:  [95 %-100 %] 97 %  BP: (127-187)/(67-97) 141/72     Body mass index is 28.25 kg/m².    Date 04/07/19 0700 - 04/08/19 0659   Shift 4510-5499 9155-2834 1227-5673 24 Hour Total   INTAKE   Shift Total(mL/kg)       OUTPUT   Urine(mL/kg/hr) 470   470   Shift Total(mL/kg) 470(6.1)   470(6.1)   Weight (kg) 77 77 77 77          Drains     Drain                 Urethral Catheter 04/06/19 2100 Non-latex less than 1 day                Physical Exam   Constitutional: She is oriented to person, place, and time. She appears well-developed and well-nourished. No distress.   HENT:   Head: Normocephalic and atraumatic.   Eyes: Pupils are equal, round, and reactive to light.   Neck: Normal range of motion. Neck supple.   Cardiovascular: Normal rate and regular rhythm.    Pulmonary/Chest: Effort normal. No respiratory distress.   Genitourinary:   Genitourinary Comments: Falcon in place with clear yellow urine   Neurological: She is alert and oriented to person, place, and time.   Psychiatric: She has a normal mood and affect. Her behavior is normal. Thought content normal.       Significant Labs:    BMP:  Recent Labs   Lab 04/06/19  1432 04/07/19  0426    137   K 3.4* 4.1    107   CO2 23 20*   BUN 16 11   CREATININE 0.9 0.8    CALCIUM 10.8* 10.0       CBC:  Recent Labs   Lab 04/06/19  1432 04/07/19  0426   WBC 10.30 18.53*   HGB 12.7 12.1   HCT 39.7 38.6    295

## 2019-04-07 NOTE — ED NOTES
Hospital medicine, ALICE Thomas at BS speaking with pt and pt family member.   Verbal order from ALICE Thomas, for pt to have 0.5 mg IV dilaudid at this time for pts current pain.   Verbal order for pt to have STAT US at this time.   Pt AAOx4 and appropriate at this time. Respirations even and unlabored. No acute distress noted.  Pt updated on POC. Bed is locked and in lowest position with side rails up x2. Call bell within reach and pt oriented to use of call bell. Pt remains on continuous cardiac monitoring, continuous pulse ox, and continuous BP cuff. Will continue to monitor. Family member remains at BS.

## 2019-04-07 NOTE — SUBJECTIVE & OBJECTIVE
Past Medical History:   Diagnosis Date    Abnormal Pap smear     repeat normal    Abnormal Pap smear of cervix     Abnormal Pap smear of vagina     Allergy     seasonal    AR (allergic rhinitis)     Hypercalcemia 9/10/2015    Hypertension     IGT (impaired glucose tolerance) 9/9/2014    Pneumonia 12/2016       Past Surgical History:   Procedure Laterality Date    ABDOMINAL SURGERY      CHOLECYSTECTOMY      1993    COLONOSCOPY N/A 12/27/2017    Performed by [facility] Clayton MD at [facility] END[facility] (4TH FLR)    COLONOSCOPY N/A 9/24/2016    Performed by Johnny [facility] MD at [facility] END[facility] (4TH FLR)    COLPOSCOPY      DILATION AND CURETTAGE OF UTERUS      DRAINAGE N/A 12/28/2016    Performed by [facility] at [facility] CATH LAB    DRAINAGE N/A 12/22/2016    Performed by [facility] CATH [facility]    HYSTERECTOMY      2007 tahbso     OOPHORECTOMY      THORACENTESIS N/A 12/20/2016    Performed by [facility] at [facility] CATH LAB       Review of patient's allergies indicates:   Allergen Reactions    Sulfa (sulfonamide antibiotics) Hives and Edema       No current facility-administered medications on file prior to encounter.      Current Outpatient Medications on File Prior to Encounter   Medication Sig    amLODIPine (NORVASC) 10 MG tablet Take 1 tablet (10 mg total) by mouth once daily.    cetirizine (ZYRTEC) 10 MG tablet Take 10 mg by mouth once daily.    cholecalciferol, vitamin D3, 50,000 unit capsule Take 1 capsule (50,000 Units total) by mouth once a week.    furosemide (LASIX) 20 MG tablet Take 1 tablet (20 mg total) by mouth once daily.    losartan (COZAAR) 100 MG tablet Take 1 tablet (100 mg total) by mouth once daily.    cyclobenzaprine (FLEXERIL) 5 MG tablet 1-2 tablets po prn muscle spasm/stiffness. Likely to cause sedation    epinephrine (EPIPEN) 0.3 mg/0.3 mL AtIn Inject 0.3 mLs (0.3 mg total) into the muscle as needed (extreme swelling, difficulty swallowing, voice changes  or trouble breathing).    hydrocortisone butyrate 0.1 % Crea cream APPLY  CREAM TO AFFECTED AREA OF FACE TWICE DAILY (Patient taking differently: APPLY  CREAM TO AFFECTED AREA OF FACE TWICE DAILY as needed)    hydrOXYzine HCl (ATARAX) 25 MG tablet Take 1 tablet (25 mg total) by mouth 3 (three) times daily as needed for Itching.    ibuprofen (ADVIL,MOTRIN) 600 MG tablet Take 1 tablet (600 mg total) by mouth every 6 (six) hours as needed for Pain or Temperature greater than (100.4).    triamcinolone acetonide 0.1% (KENALOG) 0.1 % cream AAA bid (Patient taking differently: Apply topically. Apply to chest and back as needed)     Family History     Problem Relation (Age of Onset)    Breast cancer Sister (44)    Diabetes Mother    Glaucoma Mother, Father    Hypertension Mother, Sister    No Known Problems Brother, Maternal Aunt, Maternal Uncle, Paternal Aunt, Paternal Uncle, Maternal Grandmother, Maternal Grandfather, Paternal Grandmother, Paternal Grandfather        Tobacco Use    Smoking status: Never Smoker    Smokeless tobacco: Never Used   Substance and Sexual Activity    Alcohol use: Yes     Alcohol/week: 0.0 oz     Types: 1 Standard drinks or equivalent per week     Comment: socailly    Drug use: No    Sexual activity: Yes     Partners: Male     Birth control/protection: None, See Surgical Hx     Comment: ; hysterectomy     Review of Systems   Constitutional: Negative for activity change, appetite change, chills, diaphoresis and fever.   Respiratory: Negative for cough, shortness of breath and wheezing.    Cardiovascular: Negative for chest pain and palpitations.   Gastrointestinal: Positive for abdominal pain and constipation (one small formed BM today). Negative for abdominal distention, diarrhea, nausea and vomiting.   Genitourinary: Positive for frequency. Negative for dysuria, flank pain, hematuria and urgency.   Skin: Negative for color change and pallor.   Neurological: Negative for  dizziness, light-headedness and headaches.   Psychiatric/Behavioral: Negative for behavioral problems, confusion and decreased concentration.     Objective:     Vital Signs (Most Recent):  Temp: 98.5 °F (36.9 °C) (04/06/19 2020)  Pulse: 92 (04/06/19 1937)  Resp: (!) 23 (04/06/19 1958)  BP: (!) 157/79 (04/06/19 1937)  SpO2: 96 % (04/06/19 1958) Vital Signs (24h Range):  Temp:  [97.9 °F (36.6 °C)-98.5 °F (36.9 °C)] 98.5 °F (36.9 °C)  Pulse:  [] 92  Resp:  [16-23] 23  SpO2:  [95 %-100 %] 96 %  BP: (127-175)/(69-95) 157/79     Weight: 76.7 kg (169 lb)  Body mass index is 28.12 kg/m².    Physical Exam   Constitutional: She is oriented to person, place, and time. She appears well-developed and well-nourished. No distress.   Comfortable appearing female, on bedpan upon my arrival. Moved with ease so I could remove the bedpan and complete exam.    HENT:   Head: Normocephalic and atraumatic.   Eyes: Pupils are equal, round, and reactive to light. Conjunctivae and EOM are normal. No scleral icterus.   Neck: Normal range of motion. Neck supple. No tracheal deviation present.   Cardiovascular: Normal rate, regular rhythm, normal heart sounds and intact distal pulses. Exam reveals no gallop and no friction rub.   No murmur heard.  Pulmonary/Chest: Effort normal and breath sounds normal. No stridor. No respiratory distress. She has no wheezes. She has no rales.   Abdominal: Soft. Bowel sounds are normal. She exhibits no distension and no mass. There is tenderness (Diffuse, with maximum tenderness acorss the b/l lower quadrants and suprapubic area of the abdomen. ). There is guarding (voluntary).   Neurological: She is alert and oriented to person, place, and time.   Skin: Skin is warm and dry. She is not diaphoretic. No pallor.   Psychiatric: She has a normal mood and affect. Her behavior is normal. Judgment and thought content normal.   Nursing note and vitals reviewed.        CRANIAL NERVES     CN III, IV, VI   Pupils are  equal, round, and reactive to light.  Extraocular motions are normal.        Significant Labs:   BMP:   Recent Labs   Lab 04/06/19  1432   *      K 3.4*      CO2 23   BUN 16   CREATININE 0.9   CALCIUM 10.8*     CBC:   Recent Labs   Lab 04/06/19  1432   WBC 10.30   HGB 12.7   HCT 39.7        CMP:   Recent Labs   Lab 04/06/19  1432      K 3.4*      CO2 23   *   BUN 16   CREATININE 0.9   CALCIUM 10.8*   PROT 8.3  8.3   ALBUMIN 4.1  4.1   BILITOT 0.3  0.3   ALKPHOS 117  117   AST 24  24   ALT 30  30   ANIONGAP 11   EGFRNONAA >60     Lactic Acid:   Recent Labs   Lab 04/06/19  1804   LACTATE 2.3*     Urine Culture: No results for input(s): LABURIN in the last 48 hours.  Urine Studies:   Recent Labs   Lab 04/06/19  1808   COLORU Yellow   APPEARANCEUA Clear   PHUR 7.0   SPECGRAV 1.010   PROTEINUA Negative   GLUCUA Negative   KETONESU Negative   BILIRUBINUA Negative   OCCULTUA Negative   NITRITE Negative   UROBILINOGEN Negative   LEUKOCYTESUR Negative     All pertinent labs within the past 24 hours have been reviewed.    Significant Imaging: I have reviewed all pertinent imaging results/findings within the past 24 hours.   Imaging Results          US Mesenteric Ischemia Study (xpd) (Final result)  Result time 04/06/19 20:11:43    Final result by Vicenta Medeiros MD (04/06/19 20:11:43)                 Impression:      As above described.      Electronically signed by: Vicenta Medeiros  Date:    04/06/2019  Time:    20:11             Narrative:    EXAMINATION:  Ultrasound mesenteric ischemia study    CLINICAL HISTORY:  Evaluate for mesenteric ischemia;    TECHNIQUE:  Real-time ultrasound of the abdomen was performed to evaluate the mesentery.    COMPARISON:  None.    FINDINGS:  Examination is nondiagnostic secondary to excessive overlying bowel gas.                                CT Abdomen Pelvis With Contrast (Final result)  Result time 04/06/19 18:05:27    Final result by  Carlos Maravilla MD (04/06/19 18:05:27)                 Impression:      No CT findings of acute diverticulitis.    Fluid distention and wall thickening involving the small and large bowel loops, suggestive of enterocolitis.  Free fluid in the pelvis and nonspecific fluid throughout the mesentery.  Some component of early bowel ischemia be difficult to entirely exclude.  Correlation with serum lactate levels is suggested.    Low-attenuation wedge-shaped areas in the lower pole of the left kidney, most consistent with renal infarctions.  Renal vein and the renal arteries remain patent.  Correlation with echocardiogram may be obtained.    Status post cholecystectomy.  No abnormality in the gallbladder fossa.    Poor definition of the uterus with suspected fluid within the expected location of the endometrial canal patient reported history of hysterectomy.  Pelvic ultrasound may be attempted for further evaluation.    Airspace opacities in the bilateral lower lobes.  Follow-up to resolution is suggested.    Additional findings as above.    This report was flagged in Epic as abnormal.      Electronically signed by: Carlos Maravilla MD  Date:    04/06/2019  Time:    18:05             Narrative:    EXAMINATION:  CT ABDOMEN PELVIS WITH CONTRAST    CLINICAL HISTORY:  LLQ pain, suspect diverticulitis;    TECHNIQUE:  Low dose axial images, sagittal and coronal reformations were obtained from the lung bases to the pubic symphysis following the IV administration of 75 mL of Omnipaque 350 .  Oral contrast was not given.    COMPARISON:  None.    FINDINGS:  There are no pleural effusions.  There is no evidence of a pneumothorax.  There are airspace opacities in the bilateral lower lobes.    The heart is unremarkable.  There are calcifications in the abdominal aorta.  There also calcifications involving the ostia of the aortic branch vessels including the renal arteries.  The portal veins are unremarkable.  The mesenteric vessels are  within normal limits.  The renal veins remain patent.  The IVC and the remainder of the venous structures are within normal limits.  There is no evidence of lymphadenopathy in the abdomen or pelvis.    The esophagus, stomach, and duodenum are within normal limits.  There is fluid distention and wall thickening involving the small bowel loops.  No transition point is identified.  The appendix is not consistently identified.  There are no secondary findings of acute appendicitis.  There is diffuse wall thickening involving the large bowel loops.    The liver is unremarkable.  The patient is status post cholecystectomy.  There is no abnormality in the gallbladder fossa.  The biliary tree is unremarkable.  The spleen is unremarkable.  The pancreas is within normal limits.    The adrenal glands are unremarkable.  The right kidney is unremarkable.  There is a 3 cm simple cyst in the upper pole of the left kidney.  There are multiple peripheral wedge-shaped low-attenuation regions in the lower pole of the left kidney.  The ureters are within normal limits.  The urinary bladder is within normal limits.    There is poor definition of the uterus.  There is a 4.9 x 3.0 cm fluid intensity within the endometrium.  The adnexal structures are poorly delineated.    There is small amount of free fluid in the pelvis.  There also scattered areas of free fluid throughout the mesentery.  There is marked stranding and inflammatory changes throughout the mesentery.  There is no evidence of free air.  There is no evidence of pneumatosis.    The psoas margins are unremarkable.  The abdominal wall is within normal limits.  There are degenerative changes in the osseous structures.

## 2019-04-07 NOTE — ASSESSMENT & PLAN NOTE
-Mrs. Cota is admitted to inpatient status   -she had acute onset diffuse abdominal pain without preceding fever, diarrhea or other signs of infection.  CT abdomen concerning for enterocolitis vs mesenteric ischemia and has been diagnosed with severe sepsis (fever, tachycardia, leukocytosis, lactic acidosis).    -Pain much improved after placement of bain for urinary retention  -Repeat lactic acid this morning  -Check stool studies.  -Continue IV antibiotics and fluids.  Start probiotic.  -Continue IV dilaudid for analgesia  -Await surgery consultation.  -Possibly to the floor this afternoon if she remains stable.

## 2019-04-08 ENCOUNTER — TELEPHONE (OUTPATIENT)
Dept: ENDOCRINOLOGY | Facility: CLINIC | Age: 54
End: 2019-04-08

## 2019-04-08 LAB
ANION GAP SERPL CALC-SCNC: 10 MMOL/L (ref 8–16)
BASOPHILS # BLD AUTO: 0.02 K/UL (ref 0–0.2)
BASOPHILS NFR BLD: 0.1 % (ref 0–1.9)
BUN SERPL-MCNC: 9 MG/DL (ref 6–20)
CALCIUM SERPL-MCNC: 10.1 MG/DL (ref 8.7–10.5)
CHLORIDE SERPL-SCNC: 109 MMOL/L (ref 95–110)
CO2 SERPL-SCNC: 19 MMOL/L (ref 23–29)
CREAT SERPL-MCNC: 0.9 MG/DL (ref 0.5–1.4)
DIFFERENTIAL METHOD: ABNORMAL
EOSINOPHIL # BLD AUTO: 0 K/UL (ref 0–0.5)
EOSINOPHIL NFR BLD: 0 % (ref 0–8)
ERYTHROCYTE [DISTWIDTH] IN BLOOD BY AUTOMATED COUNT: 14.8 % (ref 11.5–14.5)
EST. GFR  (AFRICAN AMERICAN): >60 ML/MIN/1.73 M^2
EST. GFR  (NON AFRICAN AMERICAN): >60 ML/MIN/1.73 M^2
GLUCOSE SERPL-MCNC: 92 MG/DL (ref 70–110)
HCT VFR BLD AUTO: 36.8 % (ref 37–48.5)
HGB BLD-MCNC: 11.9 G/DL (ref 12–16)
LYMPHOCYTES # BLD AUTO: 3.7 K/UL (ref 1–4.8)
LYMPHOCYTES NFR BLD: 23.8 % (ref 18–48)
MAGNESIUM SERPL-MCNC: 1.7 MG/DL (ref 1.6–2.6)
MCH RBC QN AUTO: 23 PG (ref 27–31)
MCHC RBC AUTO-ENTMCNC: 32.3 G/DL (ref 32–36)
MCV RBC AUTO: 71 FL (ref 82–98)
MONOCYTES # BLD AUTO: 1.7 K/UL (ref 0.3–1)
MONOCYTES NFR BLD: 10.9 % (ref 4–15)
NEUTROPHILS # BLD AUTO: 10.1 K/UL (ref 1.8–7.7)
NEUTROPHILS NFR BLD: 64.9 % (ref 38–73)
PLATELET # BLD AUTO: 284 K/UL (ref 150–350)
PMV BLD AUTO: 9.7 FL (ref 9.2–12.9)
POTASSIUM SERPL-SCNC: 3.4 MMOL/L (ref 3.5–5.1)
RBC # BLD AUTO: 5.17 M/UL (ref 4–5.4)
SODIUM SERPL-SCNC: 138 MMOL/L (ref 136–145)
VANCOMYCIN TROUGH SERPL-MCNC: 7.4 UG/ML (ref 10–22)
WBC # BLD AUTO: 15.56 K/UL (ref 3.9–12.7)
WBC #/AREA STL HPF: NORMAL /[HPF]

## 2019-04-08 PROCEDURE — 63600175 PHARM REV CODE 636 W HCPCS: Performed by: HOSPITALIST

## 2019-04-08 PROCEDURE — 99233 SBSQ HOSP IP/OBS HIGH 50: CPT | Mod: ,,, | Performed by: NURSE PRACTITIONER

## 2019-04-08 PROCEDURE — 36415 COLL VENOUS BLD VENIPUNCTURE: CPT

## 2019-04-08 PROCEDURE — 94761 N-INVAS EAR/PLS OXIMETRY MLT: CPT

## 2019-04-08 PROCEDURE — 99233 SBSQ HOSP IP/OBS HIGH 50: CPT | Mod: ,,, | Performed by: HOSPITALIST

## 2019-04-08 PROCEDURE — 99233 PR SUBSEQUENT HOSPITAL CARE,LEVL III: ICD-10-PCS | Mod: ,,, | Performed by: HOSPITALIST

## 2019-04-08 PROCEDURE — 83735 ASSAY OF MAGNESIUM: CPT

## 2019-04-08 PROCEDURE — 99233 PR SUBSEQUENT HOSPITAL CARE,LEVL III: ICD-10-PCS | Mod: ,,, | Performed by: NURSE PRACTITIONER

## 2019-04-08 PROCEDURE — 87046 STOOL CULTR AEROBIC BACT EA: CPT | Mod: 59

## 2019-04-08 PROCEDURE — 25000003 PHARM REV CODE 250: Performed by: HOSPITALIST

## 2019-04-08 PROCEDURE — 89055 LEUKOCYTE ASSESSMENT FECAL: CPT

## 2019-04-08 PROCEDURE — 87045 FECES CULTURE AEROBIC BACT: CPT

## 2019-04-08 PROCEDURE — 85025 COMPLETE CBC W/AUTO DIFF WBC: CPT

## 2019-04-08 PROCEDURE — 80048 BASIC METABOLIC PNL TOTAL CA: CPT

## 2019-04-08 PROCEDURE — 87427 SHIGA-LIKE TOXIN AG IA: CPT

## 2019-04-08 PROCEDURE — 11000001 HC ACUTE MED/SURG PRIVATE ROOM

## 2019-04-08 PROCEDURE — 80202 ASSAY OF VANCOMYCIN: CPT

## 2019-04-08 RX ADMIN — PIPERACILLIN AND TAZOBACTAM 4.5 G: 4; .5 INJECTION, POWDER, LYOPHILIZED, FOR SOLUTION INTRAVENOUS; PARENTERAL at 03:04

## 2019-04-08 RX ADMIN — HEPARIN SODIUM 5000 UNITS: 5000 INJECTION, SOLUTION INTRAVENOUS; SUBCUTANEOUS at 06:04

## 2019-04-08 RX ADMIN — LACTOBACILLUS ACIDOPHILUS / LACTOBACILLUS BULGARICUS 1 EACH: 100 MILLION CFU STRENGTH GRANULES at 08:04

## 2019-04-08 RX ADMIN — VANCOMYCIN HYDROCHLORIDE 1000 MG: 1 INJECTION, POWDER, FOR SOLUTION INTRAVENOUS at 11:04

## 2019-04-08 RX ADMIN — PIPERACILLIN AND TAZOBACTAM 4.5 G: 4; .5 INJECTION, POWDER, LYOPHILIZED, FOR SOLUTION INTRAVENOUS; PARENTERAL at 08:04

## 2019-04-08 RX ADMIN — HEPARIN SODIUM 5000 UNITS: 5000 INJECTION, SOLUTION INTRAVENOUS; SUBCUTANEOUS at 01:04

## 2019-04-08 RX ADMIN — HEPARIN SODIUM 5000 UNITS: 5000 INJECTION, SOLUTION INTRAVENOUS; SUBCUTANEOUS at 11:04

## 2019-04-08 RX ADMIN — SODIUM CHLORIDE: 0.9 INJECTION, SOLUTION INTRAVENOUS at 04:04

## 2019-04-08 RX ADMIN — ACETAMINOPHEN 650 MG: 325 TABLET ORAL at 12:04

## 2019-04-08 NOTE — TELEPHONE ENCOUNTER
----- Message from Ignacia Goldstein MA sent at 4/5/2019  4:50 PM CDT -----  Contact: Self [phone]      ----- Message -----  From: Lu Win  Sent: 4/5/2019   4:45 PM  To: Sesar MATTSON Staff    PT needs to reschedule US. She can be reached at [phone].

## 2019-04-08 NOTE — TELEPHONE ENCOUNTER
Rescheduled both her thyroid u/s and bone density to [facility] starting at 2:20 for bmd followed by 3:15 ultrasound.

## 2019-04-08 NOTE — SUBJECTIVE & OBJECTIVE
Interval History: No acute events overnight.  Was noted to have fever.  Feels much better.  Pain is 2/10 and she is ambulating.  States she is passing gas and has had one small bowel movement.  Falcon removed this morning.  Remains NPO but she is hungry.    Review of Systems   Constitutional: Negative for activity change, chills, diaphoresis and fatigue.   HENT: Negative for congestion, drooling and hearing loss.    Eyes: Negative for discharge.   Respiratory: Negative for apnea, chest tightness, shortness of breath and wheezing.    Cardiovascular: Negative for palpitations and leg swelling.   Gastrointestinal: Positive for abdominal pain. Negative for abdominal distention, constipation and diarrhea.   Endocrine: Negative for cold intolerance and heat intolerance.   Genitourinary: Negative for decreased urine volume and pelvic pain.   Musculoskeletal: Negative for arthralgias and gait problem.   Skin: Negative for rash.   Neurological: Negative for seizures, light-headedness and numbness.   Hematological: Negative for adenopathy.   Psychiatric/Behavioral: Negative for agitation and behavioral problems.     Objective:     Vital Signs (Most Recent):  Temp: 98.3 °F (36.8 °C) (04/08/19 0711)  Pulse: 107 (04/08/19 0711)  Resp: 18 (04/08/19 0711)  BP: (!) 141/75 (04/08/19 0711)  SpO2: 96 % (04/08/19 0711) Vital Signs (24h Range):  Temp:  [98.3 °F (36.8 °C)-101.7 °F (38.7 °C)] 98.3 °F (36.8 °C)  Pulse:  [] 107  Resp:  [18-21] 18  SpO2:  [95 %-98 %] 96 %  BP: (121-181)/(63-92) 141/75     Weight: 77 kg (169 lb 12.1 oz)  Body mass index is 28.25 kg/m².    Intake/Output Summary (Last 24 hours) at 4/8/2019 0723  Last data filed at 4/8/2019 0632  Gross per 24 hour   Intake 2175 ml   Output 1471 ml   Net 704 ml      Physical Exam   Constitutional: She is oriented to person, place, and time. She appears well-developed and well-nourished.   HENT:   Head: Normocephalic and atraumatic.   Eyes: Pupils are equal, round, and  reactive to light. EOM are normal.   Neck: Normal range of motion. Neck supple.   Cardiovascular: Normal rate, regular rhythm and normal heart sounds.   Pulmonary/Chest: Effort normal and breath sounds normal. No respiratory distress.   Abdominal: Bowel sounds are normal. She exhibits no distension. There is tenderness.   Soft, mild diffuse TTP w/o rebound or guarding, present but greatly diminished bowel sounds.   Musculoskeletal: Normal range of motion. She exhibits no edema.   Neurological: She is oriented to person, place, and time. No cranial nerve deficit. Coordination normal.   Skin: Skin is warm and dry.   Psychiatric: She has a normal mood and affect. Her behavior is normal.   Vitals reviewed.      Significant Labs: All pertinent labs within the past 24 hours have been reviewed.    Significant Imaging: I have reviewed and interpreted all pertinent imaging results/findings within the past 24 hours.

## 2019-04-08 NOTE — TELEPHONE ENCOUNTER
----- Message from Mariangel Mcbride sent at 4/8/2019  8:49 AM CDT -----  Contact: PT  Needs Advice    Reason for call: Patient is returning a call from Ms. Bear        Communication Preference: [phone]    Additional Information:

## 2019-04-08 NOTE — PROGRESS NOTES
[facility]  Hospital Medicine  Progress Note    Patient Name: Pastora Cota  MRN: [id number]  Patient Class: IP- Inpatient   Admission Date: 4/6/2019  Length of Stay: 2 days  Attending Physician: Rachid Myers MD  Primary Care Provider: Kiko Koch MD        Subjective:     Principal Problem:Colitis    HPI:  Ms. Pastora Cota is a 53 y.o. female, with PMH of HTN, and primary hyperparathyroidism (w/ h/o goiter), who presented to [facility] ED on 4/6/19 2/2 abdominal pain. She states she ate a yogurt this morning and felt fine all day. She had noted increased urination during the day without dysuria, urgency, post-void pressure, or flank pain. She states she had a bowel movement ~1 PM, which was small and formed and non-bloody. After that time, she began to note increasing diffuse abdominal pain. She denied associated fever, nausea, vomiting and stated she had not been passing gas. She presented to the ED, where imaging showed colitis, as well as fluid and wall thickening of the small bowel lops without transition point, and diffuse wall thickening of the large bowel loops.; all suggestive of enterocolitis. There was associated stranding and inflammatory changes throughout the mesentery without free air or pneumatosis. A lactic acid was most mildly elevated at 2.3. She was given IV fluids, and will be admitted to inpatient status.     Hospital Course:  No notes on file    Interval History: No acute events overnight.  Was noted to have fever.  Feels much better.  Pain is 2/10 and she is ambulating.  States she is passing gas and has had one small bowel movement.  Falcon removed this morning.  Remains NPO but she is hungry.    Review of Systems   Constitutional: Negative for activity change, chills, diaphoresis and fatigue.   HENT: Negative for congestion, drooling and hearing loss.    Eyes: Negative for discharge.   Respiratory: Negative for apnea, chest tightness,  shortness of breath and wheezing.    Cardiovascular: Negative for palpitations and leg swelling.   Gastrointestinal: Positive for abdominal pain. Negative for abdominal distention, constipation and diarrhea.   Endocrine: Negative for cold intolerance and heat intolerance.   Genitourinary: Negative for decreased urine volume and pelvic pain.   Musculoskeletal: Negative for arthralgias and gait problem.   Skin: Negative for rash.   Neurological: Negative for seizures, light-headedness and numbness.   Hematological: Negative for adenopathy.   Psychiatric/Behavioral: Negative for agitation and behavioral problems.     Objective:     Vital Signs (Most Recent):  Temp: 98.3 °F (36.8 °C) (04/08/19 0711)  Pulse: 107 (04/08/19 0711)  Resp: 18 (04/08/19 0711)  BP: (!) 141/75 (04/08/19 0711)  SpO2: 96 % (04/08/19 0711) Vital Signs (24h Range):  Temp:  [98.3 °F (36.8 °C)-101.7 °F (38.7 °C)] 98.3 °F (36.8 °C)  Pulse:  [] 107  Resp:  [18-21] 18  SpO2:  [95 %-98 %] 96 %  BP: (121-181)/(63-92) 141/75     Weight: 77 kg (169 lb 12.1 oz)  Body mass index is 28.25 kg/m².    Intake/Output Summary (Last 24 hours) at 4/8/2019 0723  Last data filed at 4/8/2019 0632  Gross per 24 hour   Intake 2175 ml   Output 1471 ml   Net 704 ml      Physical Exam   Constitutional: She is oriented to person, place, and time. She appears well-developed and well-nourished.   HENT:   Head: Normocephalic and atraumatic.   Eyes: Pupils are equal, round, and reactive to light. EOM are normal.   Neck: Normal range of motion. Neck supple.   Cardiovascular: Normal rate, regular rhythm and normal heart sounds.   Pulmonary/Chest: Effort normal and breath sounds normal. No respiratory distress.   Abdominal: Bowel sounds are normal. She exhibits no distension. There is tenderness.   Soft, mild diffuse TTP w/o rebound or guarding, present but greatly diminished bowel sounds.   Musculoskeletal: Normal range of motion. She exhibits no edema.   Neurological: She is  oriented to person, place, and time. No cranial nerve deficit. Coordination normal.   Skin: Skin is warm and dry.   Psychiatric: She has a normal mood and affect. Her behavior is normal.   Vitals reviewed.      Significant Labs: All pertinent labs within the past 24 hours have been reviewed.    Significant Imaging: I have reviewed and interpreted all pertinent imaging results/findings within the past 24 hours.    Assessment/Plan:      * Colitis  -Mrs. Cota is admitted to inpatient status   -she had acute onset diffuse abdominal pain without preceding fever, diarrhea or other signs of infection.  CT abdomen concerning for enterocolitis vs mesenteric ischemia and has been diagnosed with severe sepsis (fever, tachycardia, leukocytosis, lactic acidosis).    -Pain much improved after placement of bain for urinary retention.  Bain now removed and urinating ok  -She had fever overnight, but leukocytosis is improving and lactic acid is normal.  Clinically much improved with good bowel sounds, flatus and small bowel movement.  Pain now only 2/10.  -Await stool studies.  -Continue probiotic, IV antibiotics and fluids.  When afebrile x 24hours would plan to begin deescalating antibiotics.  -Continue IV dilaudid and oral norco for analgesia  -Dr. Nogueira does not believe she requires urgent surgery at this time.  -Plan to advance diet if OK with Dr. Nogueira.    Mesenteric ischemia  -CT abd concerning for possible mesenteric ischemia, however she is not a known vasculopath.  -Aside from hypertension she has no risk factors.  She is not diabetic and does not have hyperlipidemia.  She has not had hypotension.  -Diagnosis (mesenteric ischemia versus infectious colitis) remains uncertain to me as acute onset of presentation without preceding infectious symptoms sounds more like mesenteric ischemia than colitis.  -Continue serial abdominal exams and treatment of presumed sepsis due to infectious colitis for  now.    Severe sepsis  -she has fever, tachycardia, leukocytosis, lactic acidosis and source of infection  -Treat as above for colitis      Lactic acidosis  -As above for colitis      Urine retention  -Falcon placed and 600cc released followed by improvement in abd pain  -Seen by urology and they plan voiding trial today.    Hypokalemia  -Replace K today  -Repeat bmp and mag in AM      Primary hyperparathyroidism  -History noted  -Ca mildly elevated on admit and normal this morning  -holding home lasix for now.  -Monitor labs daily    Hyperlipidemia  -History noted, but not on home treatment at this time  -She does not have hyperlipidemia at this time.      Hypertension  -BP is normal to minimally elevated at this time  -holding home norvasc and losartan for now due to sepsis, but will resume as needed  -continue PRN hydralazine      VTE Risk Mitigation (From admission, onward)        Ordered     heparin (porcine) injection 5,000 Units  Every 8 hours      04/06/19 2046     IP VTE HIGH RISK PATIENT  Once      04/06/19 2046     Place sequential compression device  Until discontinued      04/06/19 2035              Rachid Myers MD  Department of Hospital Medicine   [facility]

## 2019-04-08 NOTE — NURSING
Falcon removed without difficulty, cath tip intact. No c/o pain or discomfort. Will continue to monitor.

## 2019-04-08 NOTE — PLAN OF CARE
Problem: Adult Inpatient Plan of Care  Goal: Plan of Care Review  Outcome: Ongoing (interventions implemented as appropriate)  Plan of care reviewed with patient and all questions answer. Pt remains free from fall, injury, and skin breakdown. Pt neurovascular checks are intact. Positions self independently. Patient ambulated the crane x 1 this shift. Patient had a bowel movement this shift but patient stated she forgot that we had to collect a stool and remove the hat from the toilet. Patient educated to leave the hat in the toilet so that we can collect a stool specimen. Patient verbalized understanding. Purposeful hourly rounding done. Patient has call light within reach, bed brakes lock, side rails up x2, bed in low position, and nonskid socks on. Patient lying in bed in no distress. Will continue to monitor.

## 2019-04-08 NOTE — ASSESSMENT & PLAN NOTE
-Falcon placed and 600cc released followed by improvement in abd pain  -Seen by urology and they plan voiding trial today.

## 2019-04-08 NOTE — ASSESSMENT & PLAN NOTE
-BP is normal to minimally elevated at this time  -holding home norvasc and losartan for now due to sepsis, but will resume as needed  -continue PRN hydralazine

## 2019-04-08 NOTE — ASSESSMENT & PLAN NOTE
-CT abd concerning for possible mesenteric ischemia, however she is not a known vasculopath.  -Aside from hypertension she has no risk factors.  She is not diabetic and does not have hyperlipidemia.  She has not had hypotension.  -Diagnosis (mesenteric ischemia versus infectious colitis) remains uncertain to me as acute onset of presentation without preceding infectious symptoms sounds more like mesenteric ischemia than colitis.  -Continue serial abdominal exams and treatment of presumed sepsis due to infectious colitis for now.

## 2019-04-08 NOTE — ASSESSMENT & PLAN NOTE
-Mrs. Cota is admitted to inpatient status   -she had acute onset diffuse abdominal pain without preceding fever, diarrhea or other signs of infection.  CT abdomen concerning for enterocolitis vs mesenteric ischemia and has been diagnosed with severe sepsis (fever, tachycardia, leukocytosis, lactic acidosis).    -Pain much improved after placement of bain for urinary retention.  Bain now removed and urinating ok  -She had fever overnight, but leukocytosis is improving and lactic acid is normal.  Clinically much improved with good bowel sounds, flatus and small bowel movement.  Pain now only 2/10.  -Await stool studies.  -Continue probiotic, IV antibiotics and fluids.  When afebrile x 24hours would plan to begin deescalating antibiotics.  -Continue IV dilaudid and oral norco for analgesia  -Dr. Nogueira does not believe she requires urgent surgery at this time.  -Plan to advance diet if OK with Dr. Nogueira.

## 2019-04-08 NOTE — HOSPITAL COURSE
Ms. Cota is a 53 year old woman with history of hypertension, primary hyperparathyroidism and hyperlipidemia who came in for evaluation of acute onset of abdominal pain.  She was found to have severe sepsis and evidence of colitis with possible mesenteric ischemia verus infectious colitis.  She was admitted and treated with bowel rest, intravenous fluids, intravenous pain medication, and broad-spectrum antibiotics.  She was found to have urinary retention treated with placement of a Falcon catheter but she subsequenlty passed a voiding trial.  Patient clinically improving and started on oral diet which she tolerated well without issue.  In light of the fact that she had fever and leukocytosis I suspect the patient more likely has infectious colitis as opposed to mesenteric ischemia.  Patient's intravenous antibiotics were switched to oral antibiotics which she tolerated well.  Patient stable to be discharged home on oral antibiotics and advised to follow up with her primary care provider Dr. Kiko Gutierrez.  I also recommend follow-up with Gastroenterology consideration of endoscopic evaluation once acute bout of inflammation subsides.

## 2019-04-08 NOTE — PLAN OF CARE
Problem: Adult Inpatient Plan of Care  Goal: Plan of Care Review  No significant events this shift. Remains free from fall, injury, and skin breakdown. Ambulates independently to bathroom. Voiding adequate amounts of urine.  VSS stable on RA and afebrile. Positions self independently. No c/o pain this shift. Neuro checks WDL. SCDs maintained.Tolerating ordered diet. IV site WNL. Plan of care reviewed with patient and all questions answered. Bed low and locked. Call light within reach. Purposeful rounding performed. No other complaints at this time.

## 2019-04-08 NOTE — NURSING
12:51-DEE Song notified of pt temperature 101.7, and  and that pt was administered Acetaminophen as order. No new orders given at this time. Will continue to monitor.    02:00-Temp decrease to 98.5. Will continue to monitor.

## 2019-04-08 NOTE — PROGRESS NOTES
"[facility]  Urology  Progress Note    Patient Name: Pastora Cota  MRN: [id number]  Admission Date: 4/6/2019  Hospital Length of Stay: 2 days  Code Status: Full Code   Attending Provider: Rachid Myers MD   Primary Care Physician: Kiko Koch MD    Subjective:     HPI:  Patient is a 53-year-old female admitted from the emergency room yesterday with abdominal pain.  Workup in the emergency room demonstrated colitis and she was admitted to the ICU for further observation.  Overnight she complained of difficulty urinating and a Bain catheter was placed with over 600 cc of urine drained.  Prior to that she had only been able to void 100 cc in a bedpan.  She denies any prior history of urinary retention or any other urinary symptoms.  Overall she is feeling much better today.    Interval History:   VSS, febrile overnight  Overall "feeling better" this am  Tolerating bain   +BM this am     Review of Systems   Constitutional: Positive for fever. Negative for chills.   Respiratory: Negative for chest tightness and shortness of breath.    Cardiovascular: Negative for chest pain and palpitations.   Gastrointestinal: Positive for abdominal pain. Negative for abdominal distention, constipation, nausea and vomiting.   Genitourinary: Positive for difficulty urinating (bain). Negative for flank pain and hematuria.     Objective:     Temp:  [98.3 °F (36.8 °C)-101.7 °F (38.7 °C)] 98.3 °F (36.8 °C)  Pulse:  [] 107  Resp:  [18-21] 18  SpO2:  [95 %-98 %] 96 %  BP: (121-181)/(63-92) 141/75     Body mass index is 28.25 kg/m².           Drains     Drain                 Urethral Catheter 04/06/19 2100 Non-latex 1 day                Physical Exam   Constitutional: She is oriented to person, place, and time. She appears well-developed and well-nourished.   HENT:   Head: Normocephalic and atraumatic.   Cardiovascular: Normal rate, regular rhythm and normal heart sounds.    Pulmonary/Chest: Effort " normal and breath sounds normal.   Abdominal: Soft. Bowel sounds are normal. She exhibits no distension. There is generalized tenderness. There is no CVA tenderness.   Genitourinary:   Genitourinary Comments: Bain to gravity with clear, yellow urine    Musculoskeletal: Normal range of motion.   Neurological: She is alert and oriented to person, place, and time.   Skin: Skin is warm and dry.     Psychiatric: She has a normal mood and affect. Her behavior is normal.       Significant Labs:    BMP:  Recent Labs   Lab 04/06/19  1432 04/07/19  0426 04/08/19  0440    137 138   K 3.4* 4.1 3.4*    107 109   CO2 23 20* 19*   BUN 16 11 9   CREATININE 0.9 0.8 0.9   CALCIUM 10.8* 10.0 10.1       CBC:   Recent Labs   Lab 04/06/19  1432 04/07/19  0426 04/08/19  0440   WBC 10.30 18.53* 15.56*   HGB 12.7 12.1 11.9*   HCT 39.7 38.6 36.8*    295 284       All pertinent labs results from the past 24 hours have been reviewed.    Significant Imaging:  All pertinent imaging results/findings from the past 24 hours have been reviewed.                  Assessment/Plan:     Urine retention  -- Likely secondary to acute illness and pain  -- VT today   -- Monitor PVRs after bain removal            VTE Risk Mitigation (From admission, onward)        Ordered     heparin (porcine) injection 5,000 Units  Every 8 hours      04/06/19 2046     IP VTE HIGH RISK PATIENT  Once      04/06/19 2046     Place sequential compression device  Until discontinued      04/06/19 2035          Yanna Agee NP  Urology  [facility]

## 2019-04-08 NOTE — ASSESSMENT & PLAN NOTE
-History noted, but not on home treatment at this time  -She does not have hyperlipidemia at this time.

## 2019-04-08 NOTE — NURSING
19:50-Dr. Nogueira paged to clarify NPO order. Awaiting return telephone call.    19:57-called received from Dr. Nogueira. Dr. Nogueira notified that pt has po medication to be given. Telephone order given, NPO except for medication. Telephone order read back to Dr. Nogueira.

## 2019-04-08 NOTE — ASSESSMENT & PLAN NOTE
-- Likely secondary to acute illness and pain  -- VT today   -- Monitor PVRs after bain removal

## 2019-04-08 NOTE — SUBJECTIVE & OBJECTIVE
"Interval History:   VSS, febrile overnight  Overall "feeling better" this am  Tolerating bain   +BM this am     Review of Systems   Constitutional: Positive for fever. Negative for chills.   Respiratory: Negative for chest tightness and shortness of breath.    Cardiovascular: Negative for chest pain and palpitations.   Gastrointestinal: Positive for abdominal pain. Negative for abdominal distention, constipation, nausea and vomiting.   Genitourinary: Positive for difficulty urinating (bain). Negative for flank pain and hematuria.     Objective:     Temp:  [98.3 °F (36.8 °C)-101.7 °F (38.7 °C)] 98.3 °F (36.8 °C)  Pulse:  [] 107  Resp:  [18-21] 18  SpO2:  [95 %-98 %] 96 %  BP: (121-181)/(63-92) 141/75     Body mass index is 28.25 kg/m².           Drains     Drain                 Urethral Catheter 04/06/19 2100 Non-latex 1 day                Physical Exam   Constitutional: She is oriented to person, place, and time. She appears well-developed and well-nourished.   HENT:   Head: Normocephalic and atraumatic.   Cardiovascular: Normal rate, regular rhythm and normal heart sounds.    Pulmonary/Chest: Effort normal and breath sounds normal.   Abdominal: Soft. Bowel sounds are normal. She exhibits no distension. There is generalized tenderness. There is no CVA tenderness.   Genitourinary:   Genitourinary Comments: Bain to gravity with clear, yellow urine    Musculoskeletal: Normal range of motion.   Neurological: She is alert and oriented to person, place, and time.   Skin: Skin is warm and dry.     Psychiatric: She has a normal mood and affect. Her behavior is normal.       Significant Labs:    BMP:  Recent Labs   Lab 04/06/19  1432 04/07/19  0426 04/08/19  0440    137 138   K 3.4* 4.1 3.4*    107 109   CO2 23 20* 19*   BUN 16 11 9   CREATININE 0.9 0.8 0.9   CALCIUM 10.8* 10.0 10.1       CBC:   Recent Labs   Lab 04/06/19  1432 04/07/19  0426 04/08/19  0440   WBC 10.30 18.53* 15.56*   HGB 12.7 12.1 11.9* "   HCT 39.7 38.6 36.8*    295 284       All pertinent labs results from the past 24 hours have been reviewed.    Significant Imaging:  All pertinent imaging results/findings from the past 24 hours have been reviewed.

## 2019-04-09 PROBLEM — A41.9 SEVERE SEPSIS: Status: RESOLVED | Noted: 2019-04-07 | Resolved: 2019-04-09

## 2019-04-09 PROBLEM — A09 INFECTIOUS COLITIS: Status: ACTIVE | Noted: 2019-04-06

## 2019-04-09 PROBLEM — R65.20 SEVERE SEPSIS: Status: RESOLVED | Noted: 2019-04-07 | Resolved: 2019-04-09

## 2019-04-09 PROBLEM — E87.20 LACTIC ACIDOSIS: Status: RESOLVED | Noted: 2019-04-07 | Resolved: 2019-04-09

## 2019-04-09 PROBLEM — K55.9 MESENTERIC ISCHEMIA: Status: RESOLVED | Noted: 2019-04-06 | Resolved: 2019-04-09

## 2019-04-09 LAB
ANION GAP SERPL CALC-SCNC: 9 MMOL/L (ref 8–16)
ANISOCYTOSIS BLD QL SMEAR: SLIGHT
BASOPHILS # BLD AUTO: 0.02 K/UL (ref 0–0.2)
BASOPHILS NFR BLD: 0.2 % (ref 0–1.9)
BUN SERPL-MCNC: 8 MG/DL (ref 6–20)
CALCIUM SERPL-MCNC: 10.1 MG/DL (ref 8.7–10.5)
CHLORIDE SERPL-SCNC: 110 MMOL/L (ref 95–110)
CO2 SERPL-SCNC: 21 MMOL/L (ref 23–29)
CREAT SERPL-MCNC: 0.7 MG/DL (ref 0.5–1.4)
DIFFERENTIAL METHOD: ABNORMAL
EOSINOPHIL # BLD AUTO: 0 K/UL (ref 0–0.5)
EOSINOPHIL NFR BLD: 0.2 % (ref 0–8)
ERYTHROCYTE [DISTWIDTH] IN BLOOD BY AUTOMATED COUNT: 14.5 % (ref 11.5–14.5)
EST. GFR  (AFRICAN AMERICAN): >60 ML/MIN/1.73 M^2
EST. GFR  (NON AFRICAN AMERICAN): >60 ML/MIN/1.73 M^2
GIANT PLATELETS BLD QL SMEAR: PRESENT
GLUCOSE SERPL-MCNC: 97 MG/DL (ref 70–110)
HCT VFR BLD AUTO: 33.7 % (ref 37–48.5)
HGB BLD-MCNC: 10.9 G/DL (ref 12–16)
HYPOCHROMIA BLD QL SMEAR: ABNORMAL
LYMPHOCYTES # BLD AUTO: 2.8 K/UL (ref 1–4.8)
LYMPHOCYTES NFR BLD: 22.2 % (ref 18–48)
MAGNESIUM SERPL-MCNC: 1.8 MG/DL (ref 1.6–2.6)
MCH RBC QN AUTO: 22.9 PG (ref 27–31)
MCHC RBC AUTO-ENTMCNC: 32.3 G/DL (ref 32–36)
MCV RBC AUTO: 71 FL (ref 82–98)
MONOCYTES # BLD AUTO: 1.4 K/UL (ref 0.3–1)
MONOCYTES NFR BLD: 10.9 % (ref 4–15)
NEUTROPHILS # BLD AUTO: 8.3 K/UL (ref 1.8–7.7)
NEUTROPHILS NFR BLD: 66.5 % (ref 38–73)
PLATELET # BLD AUTO: 265 K/UL (ref 150–350)
PLATELET BLD QL SMEAR: ABNORMAL
PMV BLD AUTO: 9.2 FL (ref 9.2–12.9)
POTASSIUM SERPL-SCNC: 3.1 MMOL/L (ref 3.5–5.1)
RBC # BLD AUTO: 4.76 M/UL (ref 4–5.4)
SODIUM SERPL-SCNC: 140 MMOL/L (ref 136–145)
WBC # BLD AUTO: 12.52 K/UL (ref 3.9–12.7)

## 2019-04-09 PROCEDURE — 25000003 PHARM REV CODE 250: Performed by: NURSE PRACTITIONER

## 2019-04-09 PROCEDURE — 25000003 PHARM REV CODE 250: Performed by: HOSPITALIST

## 2019-04-09 PROCEDURE — 99232 SBSQ HOSP IP/OBS MODERATE 35: CPT | Mod: ,,, | Performed by: NURSE PRACTITIONER

## 2019-04-09 PROCEDURE — 99232 PR SUBSEQUENT HOSPITAL CARE,LEVL II: ICD-10-PCS | Mod: ,,, | Performed by: NURSE PRACTITIONER

## 2019-04-09 PROCEDURE — 99233 SBSQ HOSP IP/OBS HIGH 50: CPT | Mod: ,,, | Performed by: HOSPITALIST

## 2019-04-09 PROCEDURE — 80048 BASIC METABOLIC PNL TOTAL CA: CPT

## 2019-04-09 PROCEDURE — 63600175 PHARM REV CODE 636 W HCPCS: Performed by: HOSPITALIST

## 2019-04-09 PROCEDURE — 85025 COMPLETE CBC W/AUTO DIFF WBC: CPT

## 2019-04-09 PROCEDURE — 11000001 HC ACUTE MED/SURG PRIVATE ROOM

## 2019-04-09 PROCEDURE — 36415 COLL VENOUS BLD VENIPUNCTURE: CPT

## 2019-04-09 PROCEDURE — S0030 INJECTION, METRONIDAZOLE: HCPCS | Performed by: HOSPITALIST

## 2019-04-09 PROCEDURE — 83735 ASSAY OF MAGNESIUM: CPT

## 2019-04-09 PROCEDURE — 94761 N-INVAS EAR/PLS OXIMETRY MLT: CPT

## 2019-04-09 PROCEDURE — 99233 PR SUBSEQUENT HOSPITAL CARE,LEVL III: ICD-10-PCS | Mod: ,,, | Performed by: HOSPITALIST

## 2019-04-09 RX ORDER — SODIUM CHLORIDE, SODIUM LACTATE, POTASSIUM CHLORIDE, CALCIUM CHLORIDE 600; 310; 30; 20 MG/100ML; MG/100ML; MG/100ML; MG/100ML
INJECTION, SOLUTION INTRAVENOUS CONTINUOUS
Status: DISCONTINUED | OUTPATIENT
Start: 2019-04-09 | End: 2019-04-10 | Stop reason: HOSPADM

## 2019-04-09 RX ORDER — OXYCODONE HYDROCHLORIDE 5 MG/1
5 TABLET ORAL EVERY 4 HOURS PRN
Status: DISCONTINUED | OUTPATIENT
Start: 2019-04-09 | End: 2019-04-10 | Stop reason: HOSPADM

## 2019-04-09 RX ORDER — ACETAMINOPHEN 500 MG
1000 TABLET ORAL EVERY 6 HOURS PRN
Status: DISCONTINUED | OUTPATIENT
Start: 2019-04-09 | End: 2019-04-10 | Stop reason: HOSPADM

## 2019-04-09 RX ORDER — METRONIDAZOLE 500 MG/100ML
500 INJECTION, SOLUTION INTRAVENOUS
Status: DISCONTINUED | OUTPATIENT
Start: 2019-04-09 | End: 2019-04-10 | Stop reason: HOSPADM

## 2019-04-09 RX ORDER — CIPROFLOXACIN 2 MG/ML
400 INJECTION, SOLUTION INTRAVENOUS
Status: DISCONTINUED | OUTPATIENT
Start: 2019-04-09 | End: 2019-04-10 | Stop reason: HOSPADM

## 2019-04-09 RX ORDER — LOSARTAN POTASSIUM 50 MG/1
100 TABLET ORAL NIGHTLY
Status: DISCONTINUED | OUTPATIENT
Start: 2019-04-09 | End: 2019-04-10 | Stop reason: HOSPADM

## 2019-04-09 RX ORDER — POTASSIUM CHLORIDE 20 MEQ/1
40 TABLET, EXTENDED RELEASE ORAL EVERY 4 HOURS
Status: COMPLETED | OUTPATIENT
Start: 2019-04-09 | End: 2019-04-09

## 2019-04-09 RX ADMIN — CIPROFLOXACIN 400 MG: 2 INJECTION, SOLUTION INTRAVENOUS at 11:04

## 2019-04-09 RX ADMIN — LACTOBACILLUS ACIDOPHILUS / LACTOBACILLUS BULGARICUS 1 EACH: 100 MILLION CFU STRENGTH GRANULES at 08:04

## 2019-04-09 RX ADMIN — POTASSIUM CHLORIDE 40 MEQ: 1500 TABLET, EXTENDED RELEASE ORAL at 01:04

## 2019-04-09 RX ADMIN — HEPARIN SODIUM 5000 UNITS: 5000 INJECTION, SOLUTION INTRAVENOUS; SUBCUTANEOUS at 01:04

## 2019-04-09 RX ADMIN — HEPARIN SODIUM 5000 UNITS: 5000 INJECTION, SOLUTION INTRAVENOUS; SUBCUTANEOUS at 10:04

## 2019-04-09 RX ADMIN — HEPARIN SODIUM 5000 UNITS: 5000 INJECTION, SOLUTION INTRAVENOUS; SUBCUTANEOUS at 05:04

## 2019-04-09 RX ADMIN — LOSARTAN POTASSIUM 100 MG: 50 TABLET, FILM COATED ORAL at 08:04

## 2019-04-09 RX ADMIN — METRONIDAZOLE 500 MG: 500 INJECTION, SOLUTION INTRAVENOUS at 06:04

## 2019-04-09 RX ADMIN — CIPROFLOXACIN 400 MG: 2 INJECTION, SOLUTION INTRAVENOUS at 10:04

## 2019-04-09 RX ADMIN — SODIUM CHLORIDE, SODIUM LACTATE, POTASSIUM CHLORIDE, AND CALCIUM CHLORIDE: .6; .31; .03; .02 INJECTION, SOLUTION INTRAVENOUS at 11:04

## 2019-04-09 RX ADMIN — POTASSIUM CHLORIDE 40 MEQ: 1500 TABLET, EXTENDED RELEASE ORAL at 11:04

## 2019-04-09 RX ADMIN — METRONIDAZOLE 500 MG: 500 INJECTION, SOLUTION INTRAVENOUS at 12:04

## 2019-04-09 NOTE — PROGRESS NOTES
"[facility]  Hospital Medicine  Progress Note    Patient Name: Pastora Cota  MRN: [id number]  Patient Class: IP- Inpatient   Admission Date: 4/6/2019  Length of Stay: 3 days  Attending Physician: Collin Pabon MD  Primary Care Provider: Kiko Kohc MD        Subjective:     Principal Problem:Infectious colitis    HPI:  Per Winnie Thomas, PA-C:    "Ms. Pastora Cota is a 53 y.o. female, with PMH of HTN, and primary hyperparathyroidism (w/ h/o goiter), who presented to [facility] ED on 4/6/19 2/2 abdominal pain. She states she ate a yogurt this morning and felt fine all day. She had noted increased urination during the day without dysuria, urgency, post-void pressure, or flank pain. She states she had a bowel movement ~1 PM, which was small and formed and non-bloody. After that time, she began to note increasing diffuse abdominal pain. She denied associated fever, nausea, vomiting and stated she had not been passing gas. She presented to the ED, where imaging showed colitis, as well as fluid and wall thickening of the small bowel lops without transition point, and diffuse wall thickening of the large bowel loops.; all suggestive of enterocolitis. There was associated stranding and inflammatory changes throughout the mesentery without free air or pneumatosis. A lactic acid was most mildly elevated at 2.3. She was given IV fluids, and will be admitted to inpatient status."    Hospital Course:  Ms. Cota is a 53 year old woman with history of hypertension, primary hyperparathyroidism and hyperlipidemia who came in for evaluation of acute onset of abdominal pain.  She was found to have severe sepsis and evidence of colitis with possible mesenteric ischemia verus infectious colitis.  She was admitted and treated with bowel rest, intravenous fluids, intravenous pain medication, and broad-spectrum antibiotics.  She was found to have urinary retention treated with " placement of a Falcon catheter but she subsequenlty passed a voiding trial.  Patient clinically improving and started on oral diet yesterday with improving pain.    Interval History:  No acute events overnight.  Tolerating some oral intake yesterday.  Abdominal pain improving.    Review of Systems   Constitutional: Negative for chills and fever.   Respiratory: Negative for shortness of breath and wheezing.    Cardiovascular: Negative for chest pain.   Gastrointestinal: Positive for abdominal pain. Negative for abdominal distention, constipation, diarrhea, nausea and vomiting.   Genitourinary: Negative for dysuria and frequency.   Musculoskeletal: Negative for arthralgias and myalgias.   Neurological: Negative for light-headedness.   Psychiatric/Behavioral: Negative for agitation and confusion.     Objective:     Vital Signs (Most Recent):  Temp: 98.9 °F (37.2 °C) (04/09/19 0732)  Pulse: 102 (04/09/19 0800)  Resp: 18 (04/09/19 0749)  BP: (!) 118/59 (04/09/19 0732)  SpO2: 96 % (04/09/19 0749) Vital Signs (24h Range):  Temp:  [98.5 °F (36.9 °C)-100.3 °F (37.9 °C)] 98.9 °F (37.2 °C)  Pulse:  [] 102  Resp:  [18-20] 18  SpO2:  [94 %-96 %] 96 %  BP: (118-167)/(59-89) 118/59     Weight: 77 kg (169 lb 12.1 oz)  Body mass index is 28.25 kg/m².    Intake/Output Summary (Last 24 hours) at 4/9/2019 1043  Last data filed at 4/9/2019 0659  Gross per 24 hour   Intake 4247.5 ml   Output 1500 ml   Net 2747.5 ml      Physical Exam   Constitutional: She is oriented to person, place, and time. She appears well-developed and well-nourished. No distress.   HENT:   Head: Atraumatic.   Eyes: Conjunctivae are normal.   Neck: Neck supple.   Cardiovascular: Normal rate, regular rhythm and normal heart sounds.   No murmur heard.  Pulmonary/Chest: Effort normal and breath sounds normal. She has no wheezes.   Abdominal: Soft. Bowel sounds are normal. She exhibits no distension. There is tenderness.   Bilateral lower abdominal tenderness  present.  Hypoactive bowel sounds.   Musculoskeletal: Normal range of motion. She exhibits no edema or deformity.   Neurological: She is alert and oriented to person, place, and time.       Significant Labs: All pertinent labs within the past 24 hours have been reviewed.    Significant Imaging: I have reviewed all pertinent imaging results/findings within the past 24 hours.    Assessment/Plan:      * Infectious colitis  Less likely ischemic colitis in the setting of fever, tachycardia, leukocytosis and with clinical improvement with antibiotic therapy.  Tolerating some oral intake.  Continue with diet as per general surgery.  Cultures negative.  No further fevers since 4/8/2019 0040 hours.  Will de-escalte antibiotics to ciprofloxacin and metronidazole.  Continue with supportive care measures.    Hypokalemia  Will replace further.    Essential hypertension  Holding home blood pressure medications.  Normotensive.      Hyperlipidemia  Recommend outpatient follow-up.    Urine retention  Patient passed voiding trial.    Primary hyperparathyroidism  Stable.  Recommend outpatient follow-up.      VTE Risk Mitigation (From admission, onward)        Ordered     heparin (porcine) injection 5,000 Units  Every 8 hours      04/06/19 2046     IP VTE HIGH RISK PATIENT  Once      04/06/19 2046     Place sequential compression device  Until discontinued      04/06/19 2035              Collin Pabon MD  Department of Hospital Medicine   [facility]

## 2019-04-09 NOTE — PLAN OF CARE
Problem: Adult Inpatient Plan of Care  Goal: Plan of Care Review  Outcome: Ongoing (interventions implemented as appropriate)  Plan of care reviewed with patient. Pt remains free from fall, injury, and skin breakdown. Pt neurovascular checks are intact. Positions self independently. Patient ambulates in room. Patient voiding without difficulty. Purposeful hourly rounding done. Patient has call light within reach, bed brakes lock, side rails up x2, bed in low position, and nonskid socks on. Patient lying in bed in no distress. Will continue to monitor.

## 2019-04-09 NOTE — SUBJECTIVE & OBJECTIVE
Interval History:  No acute events overnight.  Tolerating some oral intake yesterday.  Abdominal pain improving.    Review of Systems   Constitutional: Negative for chills and fever.   Respiratory: Negative for shortness of breath and wheezing.    Cardiovascular: Negative for chest pain.   Gastrointestinal: Positive for abdominal pain. Negative for abdominal distention, constipation, diarrhea, nausea and vomiting.   Genitourinary: Negative for dysuria and frequency.   Musculoskeletal: Negative for arthralgias and myalgias.   Neurological: Negative for light-headedness.   Psychiatric/Behavioral: Negative for agitation and confusion.     Objective:     Vital Signs (Most Recent):  Temp: 98.9 °F (37.2 °C) (04/09/19 0732)  Pulse: 102 (04/09/19 0800)  Resp: 18 (04/09/19 0749)  BP: (!) 118/59 (04/09/19 0732)  SpO2: 96 % (04/09/19 0749) Vital Signs (24h Range):  Temp:  [98.5 °F (36.9 °C)-100.3 °F (37.9 °C)] 98.9 °F (37.2 °C)  Pulse:  [] 102  Resp:  [18-20] 18  SpO2:  [94 %-96 %] 96 %  BP: (118-167)/(59-89) 118/59     Weight: 77 kg (169 lb 12.1 oz)  Body mass index is 28.25 kg/m².    Intake/Output Summary (Last 24 hours) at 4/9/2019 1043  Last data filed at 4/9/2019 0659  Gross per 24 hour   Intake 4247.5 ml   Output 1500 ml   Net 2747.5 ml      Physical Exam   Constitutional: She is oriented to person, place, and time. She appears well-developed and well-nourished. No distress.   HENT:   Head: Atraumatic.   Eyes: Conjunctivae are normal.   Neck: Neck supple.   Cardiovascular: Normal rate, regular rhythm and normal heart sounds.   No murmur heard.  Pulmonary/Chest: Effort normal and breath sounds normal. She has no wheezes.   Abdominal: Soft. Bowel sounds are normal. She exhibits no distension. There is tenderness.   Bilateral lower abdominal tenderness present.  Hypoactive bowel sounds.   Musculoskeletal: Normal range of motion. She exhibits no edema or deformity.   Neurological: She is alert and oriented to person,  place, and time.       Significant Labs: All pertinent labs within the past 24 hours have been reviewed.    Significant Imaging: I have reviewed all pertinent imaging results/findings within the past 24 hours.

## 2019-04-09 NOTE — ASSESSMENT & PLAN NOTE
Less likely ischemic colitis in the setting of fever, tachycardia, leukocytosis and with clinical improvement with antibiotic therapy.  Tolerating some oral intake.  Continue with diet as per general surgery.  Cultures negative.  No further fevers since 4/8/2019 0040 hours.  Will de-escalte antibiotics to ciprofloxacin and metronidazole.  Continue with supportive care measures.

## 2019-04-09 NOTE — SUBJECTIVE & OBJECTIVE
Interval History:   Doing well, eager for d/c home   Successful VT yesterday  Denies difficulty voiding     Review of Systems   Constitutional: Negative for chills and fever.   Respiratory: Negative for chest tightness and shortness of breath.    Cardiovascular: Negative for chest pain and palpitations.   Gastrointestinal: Positive for abdominal pain. Negative for abdominal distention, nausea and vomiting.   Genitourinary: Negative for decreased urine volume, difficulty urinating, dysuria, flank pain, frequency, hematuria and urgency.     Objective:     Temp:  [98.5 °F (36.9 °C)-100.3 °F (37.9 °C)] 98.9 °F (37.2 °C)  Pulse:  [] 103  Resp:  [18-20] 18  SpO2:  [94 %-96 %] 96 %  BP: (118-167)/(59-89) 118/59     Body mass index is 28.25 kg/m².      Bladder Scan Volume (mL): 180 mL (04/08/19 1800)    Drains          None          Physical Exam   Constitutional: She is oriented to person, place, and time. She appears well-developed and well-nourished.   HENT:   Head: Normocephalic and atraumatic.   Cardiovascular: Normal rate, regular rhythm and normal heart sounds.    Pulmonary/Chest: Effort normal and breath sounds normal.   Abdominal: Soft. Bowel sounds are normal. She exhibits no distension. There is tenderness. There is no CVA tenderness.   Neurological: She is alert and oriented to person, place, and time.   Skin: Skin is warm and dry.     Psychiatric: She has a normal mood and affect. Her behavior is normal.       Significant Labs:    BMP:  Recent Labs   Lab 04/07/19  0426 04/08/19 0440 04/09/19  0622    138 140   K 4.1 3.4* 3.1*    109 110   CO2 20* 19* 21*   BUN 11 9 8   CREATININE 0.8 0.9 0.7   CALCIUM 10.0 10.1 10.1       CBC:   Recent Labs   Lab 04/07/19 0426 04/08/19  0440 04/09/19  0623   WBC 18.53* 15.56* 12.52   HGB 12.1 11.9* 10.9*   HCT 38.6 36.8* 33.7*    284 265       All pertinent labs results from the past 24 hours have been reviewed.    Significant Imaging:  All pertinent  imaging results/findings from the past 24 hours have been reviewed.

## 2019-04-09 NOTE — PROGRESS NOTES
[facility]  Urology  Progress Note    Patient Name: Pastora Cota  MRN: [id number]  Admission Date: 4/6/2019  Hospital Length of Stay: 3 days  Code Status: Full Code   Attending Provider: Collin Pabon MD   Primary Care Physician: Kiko Koch MD    Subjective:     HPI:  Patient is a 53-year-old female admitted from the emergency room yesterday with abdominal pain.  Workup in the emergency room demonstrated colitis and she was admitted to the ICU for further observation.  Overnight she complained of difficulty urinating and a Falcon catheter was placed with over 600 cc of urine drained.  Prior to that she had only been able to void 100 cc in a bedpan.  She denies any prior history of urinary retention or any other urinary symptoms.  Overall she is feeling much better today.    Interval History:   Doing well, eager for d/c home   Successful VT yesterday  Denies difficulty voiding     Review of Systems   Constitutional: Negative for chills and fever.   Respiratory: Negative for chest tightness and shortness of breath.    Cardiovascular: Negative for chest pain and palpitations.   Gastrointestinal: Positive for abdominal pain. Negative for abdominal distention, nausea and vomiting.   Genitourinary: Negative for decreased urine volume, difficulty urinating, dysuria, flank pain, frequency, hematuria and urgency.     Objective:     Temp:  [98.5 °F (36.9 °C)-100.3 °F (37.9 °C)] 98.9 °F (37.2 °C)  Pulse:  [] 103  Resp:  [18-20] 18  SpO2:  [94 %-96 %] 96 %  BP: (118-167)/(59-89) 118/59     Body mass index is 28.25 kg/m².      Bladder Scan Volume (mL): 180 mL (04/08/19 1800)    Drains          None          Physical Exam   Constitutional: She is oriented to person, place, and time. She appears well-developed and well-nourished.   HENT:   Head: Normocephalic and atraumatic.   Cardiovascular: Normal rate, regular rhythm and normal heart sounds.    Pulmonary/Chest: Effort normal and  breath sounds normal.   Abdominal: Soft. Bowel sounds are normal. She exhibits no distension. There is tenderness. There is no CVA tenderness.   Neurological: She is alert and oriented to person, place, and time.   Skin: Skin is warm and dry.     Psychiatric: She has a normal mood and affect. Her behavior is normal.       Significant Labs:    BMP:  Recent Labs   Lab 04/07/19  0426 04/08/19  0440 04/09/19  0622    138 140   K 4.1 3.4* 3.1*    109 110   CO2 20* 19* 21*   BUN 11 9 8   CREATININE 0.8 0.9 0.7   CALCIUM 10.0 10.1 10.1       CBC:   Recent Labs   Lab 04/07/19 0426 04/08/19  0440 04/09/19  0623   WBC 18.53* 15.56* 12.52   HGB 12.1 11.9* 10.9*   HCT 38.6 36.8* 33.7*    284 265       All pertinent labs results from the past 24 hours have been reviewed.    Significant Imaging:  All pertinent imaging results/findings from the past 24 hours have been reviewed.                  Assessment/Plan:     Urine retention  -- Likely secondary to acute illness and pain  -- Successful VT yesterday           VTE Risk Mitigation (From admission, onward)        Ordered     heparin (porcine) injection 5,000 Units  Every 8 hours      04/06/19 2046     IP VTE HIGH RISK PATIENT  Once      04/06/19 2046     Place sequential compression device  Until discontinued      04/06/19 2035        I will sign off. Please call with questions or concerns.     Yanna Agee NP  Urology  [facility]

## 2019-04-10 ENCOUNTER — NURSE TRIAGE (OUTPATIENT)
Dept: ADMINISTRATIVE | Facility: CLINIC | Age: 54
End: 2019-04-10

## 2019-04-10 VITALS
HEART RATE: 85 BPM | DIASTOLIC BLOOD PRESSURE: 82 MMHG | SYSTOLIC BLOOD PRESSURE: 166 MMHG | WEIGHT: 169.75 LBS | HEIGHT: 65 IN | BODY MASS INDEX: 28.28 KG/M2 | RESPIRATION RATE: 18 BRPM | TEMPERATURE: 98 F | OXYGEN SATURATION: 97 %

## 2019-04-10 LAB
ANION GAP SERPL CALC-SCNC: 7 MMOL/L (ref 8–16)
ANISOCYTOSIS BLD QL SMEAR: SLIGHT
BASOPHILS # BLD AUTO: 0.02 K/UL (ref 0–0.2)
BASOPHILS NFR BLD: 0.2 % (ref 0–1.9)
BUN SERPL-MCNC: 8 MG/DL (ref 6–20)
CALCIUM SERPL-MCNC: 10.2 MG/DL (ref 8.7–10.5)
CHLORIDE SERPL-SCNC: 110 MMOL/L (ref 95–110)
CO2 SERPL-SCNC: 23 MMOL/L (ref 23–29)
CREAT SERPL-MCNC: 0.8 MG/DL (ref 0.5–1.4)
DIFFERENTIAL METHOD: ABNORMAL
EOSINOPHIL # BLD AUTO: 0.1 K/UL (ref 0–0.5)
EOSINOPHIL NFR BLD: 1.4 % (ref 0–8)
ERYTHROCYTE [DISTWIDTH] IN BLOOD BY AUTOMATED COUNT: 14.4 % (ref 11.5–14.5)
EST. GFR  (AFRICAN AMERICAN): >60 ML/MIN/1.73 M^2
EST. GFR  (NON AFRICAN AMERICAN): >60 ML/MIN/1.73 M^2
GLUCOSE SERPL-MCNC: 104 MG/DL (ref 70–110)
HCT VFR BLD AUTO: 32.1 % (ref 37–48.5)
HGB BLD-MCNC: 10.4 G/DL (ref 12–16)
HYPOCHROMIA BLD QL SMEAR: ABNORMAL
LYMPHOCYTES # BLD AUTO: 3.1 K/UL (ref 1–4.8)
LYMPHOCYTES NFR BLD: 32.2 % (ref 18–48)
MAGNESIUM SERPL-MCNC: 1.9 MG/DL (ref 1.6–2.6)
MCH RBC QN AUTO: 22.9 PG (ref 27–31)
MCHC RBC AUTO-ENTMCNC: 32.4 G/DL (ref 32–36)
MCV RBC AUTO: 71 FL (ref 82–98)
MONOCYTES # BLD AUTO: 1.2 K/UL (ref 0.3–1)
MONOCYTES NFR BLD: 12.9 % (ref 4–15)
NEUTROPHILS # BLD AUTO: 5 K/UL (ref 1.8–7.7)
NEUTROPHILS NFR BLD: 53.3 % (ref 38–73)
PHOSPHATE SERPL-MCNC: 2.9 MG/DL (ref 2.7–4.5)
PLATELET # BLD AUTO: 280 K/UL (ref 150–350)
PLATELET BLD QL SMEAR: ABNORMAL
PMV BLD AUTO: 9 FL (ref 9.2–12.9)
POLYCHROMASIA BLD QL SMEAR: ABNORMAL
POTASSIUM SERPL-SCNC: 3.9 MMOL/L (ref 3.5–5.1)
RBC # BLD AUTO: 4.54 M/UL (ref 4–5.4)
SODIUM SERPL-SCNC: 140 MMOL/L (ref 136–145)
WBC # BLD AUTO: 9.5 K/UL (ref 3.9–12.7)

## 2019-04-10 PROCEDURE — 99239 HOSP IP/OBS DSCHRG MGMT >30: CPT | Mod: ,,, | Performed by: HOSPITALIST

## 2019-04-10 PROCEDURE — 83735 ASSAY OF MAGNESIUM: CPT

## 2019-04-10 PROCEDURE — 25000003 PHARM REV CODE 250: Performed by: HOSPITALIST

## 2019-04-10 PROCEDURE — 85025 COMPLETE CBC W/AUTO DIFF WBC: CPT

## 2019-04-10 PROCEDURE — 97165 OT EVAL LOW COMPLEX 30 MIN: CPT

## 2019-04-10 PROCEDURE — 99239 PR HOSPITAL DISCHARGE DAY,>30 MIN: ICD-10-PCS | Mod: ,,, | Performed by: HOSPITALIST

## 2019-04-10 PROCEDURE — 63600175 PHARM REV CODE 636 W HCPCS: Performed by: HOSPITALIST

## 2019-04-10 PROCEDURE — S0030 INJECTION, METRONIDAZOLE: HCPCS | Performed by: HOSPITALIST

## 2019-04-10 PROCEDURE — 80048 BASIC METABOLIC PNL TOTAL CA: CPT

## 2019-04-10 PROCEDURE — 84100 ASSAY OF PHOSPHORUS: CPT

## 2019-04-10 PROCEDURE — 97161 PT EVAL LOW COMPLEX 20 MIN: CPT

## 2019-04-10 RX ORDER — CIPROFLOXACIN 500 MG/1
500 TABLET ORAL EVERY 12 HOURS
Qty: 14 TABLET | Refills: 0 | Status: SHIPPED | OUTPATIENT
Start: 2019-04-10 | End: 2019-04-17

## 2019-04-10 RX ORDER — AMLODIPINE BESYLATE 5 MG/1
10 TABLET ORAL DAILY
Status: DISCONTINUED | OUTPATIENT
Start: 2019-04-10 | End: 2019-04-10 | Stop reason: HOSPADM

## 2019-04-10 RX ORDER — ACETAMINOPHEN 500 MG
1000 TABLET ORAL EVERY 6 HOURS PRN
Refills: 0 | COMMUNITY
Start: 2019-04-10 | End: 2020-06-15

## 2019-04-10 RX ORDER — METRONIDAZOLE 500 MG/1
500 TABLET ORAL EVERY 8 HOURS
Qty: 21 TABLET | Refills: 0 | Status: SHIPPED | OUTPATIENT
Start: 2019-04-10 | End: 2019-04-17

## 2019-04-10 RX ORDER — FUROSEMIDE 20 MG/1
20 TABLET ORAL DAILY
Status: DISCONTINUED | OUTPATIENT
Start: 2019-04-10 | End: 2019-04-10 | Stop reason: HOSPADM

## 2019-04-10 RX ORDER — LOSARTAN POTASSIUM 100 MG/1
100 TABLET ORAL NIGHTLY
Start: 2019-04-10 | End: 2019-06-12 | Stop reason: SDUPTHER

## 2019-04-10 RX ORDER — SIMETHICONE 80 MG
1 TABLET,CHEWABLE ORAL ONCE
Status: COMPLETED | OUTPATIENT
Start: 2019-04-10 | End: 2019-04-10

## 2019-04-10 RX ORDER — CETIRIZINE HYDROCHLORIDE 10 MG/1
10 TABLET ORAL DAILY PRN
Refills: 0 | COMMUNITY
Start: 2019-04-10 | End: 2020-01-08

## 2019-04-10 RX ADMIN — AMLODIPINE BESYLATE 10 MG: 5 TABLET ORAL at 09:04

## 2019-04-10 RX ADMIN — SIMETHICONE CHEW TAB 80 MG 80 MG: 80 TABLET ORAL at 09:04

## 2019-04-10 RX ADMIN — SODIUM CHLORIDE, SODIUM LACTATE, POTASSIUM CHLORIDE, AND CALCIUM CHLORIDE: .6; .31; .03; .02 INJECTION, SOLUTION INTRAVENOUS at 01:04

## 2019-04-10 RX ADMIN — METRONIDAZOLE 500 MG: 500 INJECTION, SOLUTION INTRAVENOUS at 02:04

## 2019-04-10 RX ADMIN — CIPROFLOXACIN 400 MG: 2 INJECTION, SOLUTION INTRAVENOUS at 10:04

## 2019-04-10 RX ADMIN — LACTOBACILLUS ACIDOPHILUS / LACTOBACILLUS BULGARICUS 1 EACH: 100 MILLION CFU STRENGTH GRANULES at 09:04

## 2019-04-10 RX ADMIN — HEPARIN SODIUM 5000 UNITS: 5000 INJECTION, SOLUTION INTRAVENOUS; SUBCUTANEOUS at 05:04

## 2019-04-10 RX ADMIN — FUROSEMIDE 20 MG: 20 TABLET ORAL at 09:04

## 2019-04-10 RX ADMIN — METRONIDAZOLE 500 MG: 500 INJECTION, SOLUTION INTRAVENOUS at 11:04

## 2019-04-10 NOTE — PLAN OF CARE
Problem: Occupational Therapy Goal  Goal: Occupational Therapy Goal  Outcome: Outcome(s) achieved Date Met: 04/10/19  Initial OT eval complete.  No needs identified at this time.  Recommend return home with no post acute therapy or DME needs anticipated.  To d/c OT services.

## 2019-04-10 NOTE — PHYSICIAN QUERY
PT Name: Pastora Cota  MR #: [id number]     Physician Query Form - Documentation Clarification      CDS/: Eve Mcbride RN, CDS               Contact information: [email or web address]                                                                                                                         [phone]    This form is a permanent document in the medical record.     Query Date: April 10, 2019    By submitting this query, we are merely seeking further clarification of documentation. Please utilize your independent clinical judgment when addressing the question(s) below.    The Medical record reflects the following:    Supporting Clinical Findings Location in Medical Record   concerns for mesenteric ischemia   ED Provider Note 4/6   CT abd concerning for possible mesenteric ischemia, however she is not a known vasculopathy    PN 4/7   Aside from hypertension she has no risk factors.  She is not diabetic and does not have hyperlipidemia.  She has not had hypotension.  -Diagnosis (mesenteric ischemia versus infectious colitis) remains uncertain to me as acute onset of presentation without preceding infectious symptoms sounds more like mesenteric ischemia than colitis   HM PN 4/8   Less likely ischemic colitis in the setting of fever, tachycardia, leukocytosis and with clinical improvement with antibiotic therapy    PN 4/9   she had acute onset diffuse abdominal pain without preceding fever, diarrhea or other signs of infection    PN 4/8   She states she had a bowel movement ~1 PM, which was small and formed and non-bloody. After that time, she began to note increasing diffuse abdominal pain. She denied associated fever, nausea, vomiting and stated she had not been passing gas    PN 4/8   Fluid distention and wall thickening involving the small and large bowel loops, suggestive of enterocolitis.  Free fluid in the pelvis and nonspecific fluid throughout the mesentery.  Some component  of early bowel ischemia be difficult to entirely exclude. Correlation with serum lactate levels is suggested   CT Abdomen 4/6   Lactate: 2.3 --> 2.7 --> 1.4   Labs 4/6 - 4/7                                                                            Doctor, Please specify diagnosis or diagnoses associated with above clinical findings.    Provider Use Only    [   ] Mesenteric Ischemia ruled in, please further specify:              [   ] Acute, please further specify as:                         [   ] Focal (segmental)    [   ] Diffuse   [   ] Unspecified extent             [   ] Chronic              [   ] Unspecified acuity/chronicity  [   ] Mesenteric Ischemia ruled out   [ x ] Other intestinal diagnosis, please specify: __Colitis__________   [   ] Clinically undetermined                                                                                                         [  ] Clinically Undetermined

## 2019-04-10 NOTE — PLAN OF CARE
Problem: Physical Therapy Goal  Goal: Physical Therapy Goal  Outcome: Outcome(s) achieved Date Met: 04/10/19  Patient evaluated by PT. She demonstrates safety with functional mobility including ambulation in hallway. No further acute PT needs at this time. PT signing off.

## 2019-04-10 NOTE — PT/OT/SLP EVAL
"Physical Therapy Evaluation and Discharge Note    Patient Name:  Pastora Cota   MRN:  [id number]    Recommendations:     Discharge Recommendations:  home   Discharge Equipment Recommendations: none   Barriers to discharge: None    Assessment:     Pastora Cota is a 53 y.o. female admitted with a medical diagnosis of Infectious colitis. Pt presented to ED with abdominal pain.     Patient evaluated by PT. She demonstrates safety with functional mobility including ambulation in hallway. No further acute PT needs at this time. PT signing off.     Recent Surgery: * No surgery found *      Plan:     During this hospitalization, patient does not require further acute PT services.  Please re-consult if situation changes.      Subjective     Chief Complaint: abdominal pain  Patient/Family Comments/goals: none stated, pt denied therapy needs for discharge home  Pain/Comfort:  · Pain Rating 1: 1/10  · Location 1: abdomen("cramping")  · Pain Addressed 1: Cessation of Activity  · Pain Rating Post-Intervention 1: 1/10    Patients cultural, spiritual, Faith conflicts given the current situation: no    Living Environment:  · Per patient: Pt lives with spouse in a 1 story house with 3 to enter and bilateral handrail(s).   · Pt has a walk-in shower and a standard height toilet.   · Upon discharge, patient will have assistance from spouse.  Prior level of function:  · Ambulation: indep no device  · ADL's: indep no device  · iADL's: indep, driving, cooking, cleaning, works as a  for health insurance company  · Falls: denies  Equipment used at home:   · none    Objective:     Communicated with bedside nurse Clement and OT Yanelis prior to session.  Patient found sitting up in chair with peripheral IV upon PT entry to room.    General Precautions: Standard,     Orthopedic Precautions:N/A   Braces: N/A     Exams:  Cognition:   Patient is oriented to name, , name of facility, month, year and " reason for admission.  Pt follows approximately 100% of simple commands.    Mood: Pleasant and cooperative.   Musculoskeletal:  Posture:    -       No postural abnormalities identified  LE ROM/Strength:     Left  Muscle group Right   5/5 Hip flexion 5/5   5/5 Knee extension 5/5   5/5 Ankle dorsiflexion 5/5     Neuromuscular:  Sensation: Intact to light touch bilateral LEs.   Coordination: (-) rapid alt movements bilateral LEs  Tone/Reflexes: No impairments identified with functional mobility. No formal testing performed.   Balance: indep with no AD for dynamic standing  Visual-vestibular: No impairments identified with functional mobility. No formal testing performed.  Integument: Visible skin intact  Cardiopulmonary:  Edema: none noted  Vital signs:   SpO2 on room air: 97%   Heart rate 100 bpm at rest and 104 bpm after light activity    Functional Mobility:  Bed Mobility:     Supine to Sit: did not occur  Sit to Supine: did not occur  Transfers:     Sit to Stand:  indep with no AD x 2 trials.  Gait: > 150 ft on level tile with no AD independently. Approximately 0.75 m/s gait speed.     4-Item Dynamic Gait Index:    1. Gait level surface (3) Normal: Walks 20ft, no assistive devices, good speed, no evidence for imbalance, normal gait pattern  2. Change in gait speed: (3) Normal: Able to smoothly change walking speed without loss of balance or gait deviation. Shows asignificant difference in walking speeds between normal, fast and slow speeds.  3. Gait with horizontal head turns:(3) Normal: Performs head turns smoothly with no change in gait.  4. Gait with vertical head turns (3) Normal: Performs head turns smoothly with no change in gait.    Total: 12/12      AM-PAC 6 CLICK MOBILITY  Total Score:24     PT educated patient re:   Role of PT  · Evaluation and functional mobility as above      AM-PAC 6 CLICK MOBILITY  Total Score:24     Patient left up in chair with all lines intact, call button in reach and bedside  nurse notified.    GOALS:   Multidisciplinary Problems     Physical Therapy Goals     Not on file          Multidisciplinary Problems (Resolved)        Problem: Physical Therapy Goal    Goal Priority Disciplines Outcome Goal Variances Interventions   Physical Therapy Goal   (Resolved)     PT, PT/OT Outcome(s) achieved                     History:     Past Medical History:   Diagnosis Date    Abnormal Pap smear     repeat normal    Abnormal Pap smear of cervix     Abnormal Pap smear of vagina     Allergy     seasonal    AR (allergic rhinitis)     Hypercalcemia 9/10/2015    Hypertension     IGT (impaired glucose tolerance) 9/9/2014    Pneumonia 12/2016       Past Surgical History:   Procedure Laterality Date    ABDOMINAL SURGERY      CHOLECYSTECTOMY      1993    COLONOSCOPY N/A 12/27/2017    [facility]O ([facility]TH FLR)    COLONOSCOPY N/A 9/24/2016    [facility] at [facility] ENDO (4TH FLR)    COLPOSCOPY      DILATION AND CURETTAGE OF UTERUS      DRAINAGE N/A 12/28/2016    [facility] CATH LAB    DRAINAGE N/A 12/22/2016    Per[facility] CATH LAB    HYSTERECTOMY      2007 tahbso     OOPHORECTOMY      THORACENTESIS N/A 12/20/2016    Performed by [facility] CATH LAB       Time Tracking:     PT Received On: 04/10/19  PT Start Time: 1020     PT Stop Time: 1036  PT Total Time (min): 16 min     Billable Minutes: Evaluation 12   Overlap with OT for evaluation      Sophia Delgado, PT  04/10/2019

## 2019-04-10 NOTE — PLAN OF CARE
Pt discharging home today. Family to transport home.    Pt confirms no CM needs or barriers for discharge.     04/10/19 1239   Final Note   Assessment Type Final Discharge Note   Anticipated Discharge Disposition Home   What phone number can be called within the next 1-3 days to see how you are doing after discharge? [phone]   Hospital Follow Up  Appt(s) scheduled? Yes   Discharge plans and expectations educations in teach back method with documentation complete? Yes   Right Care Referral Info   Post Acute Recommendation No Care

## 2019-04-10 NOTE — PHYSICIAN QUERY
"PT Name: Pastora Cota  MR #: [id number]    Physician Query Form - Heart  Condition Clarification     CDS/: Eve Mcbride RN, CDS               Contact information: [email or web address]                                                                                                                        [phone]  This form is a permanent document in the medical record.     Query Date: April 10, 2019    By submitting this query, we are merely seeking further clarification of documentation. Please utilize your independent clinical judgment when addressing the question(s) below.    The medical record contains the following   Indicators     Supporting Clinical Findings Location in Medical Record    BNP     x EF pEF   eICU Addendum 4/6   x Radiology findings There are no pleural effusions.  There is no evidence of a pneumothorax.  There are airspace opacities in the bilateral lower lobes.    The heart is unremarkable... The portal veins are unremarkable.  The mesenteric vessels are within normal limits.  The renal veins remain patent.  The IVC and the remainder of the venous structures are within normal limits   CT Abdomen 4/6    Echo Results      "Ascites" documented      "SOB" or "CARRION" documented      "Hypoxia" documented     x Heart Failure documented HFpEF eICU Addendum 4/6    "Edema" documented     x Diuretics/Meds furosemide tablet 20 mg    furosemide (LASIX) 20 MG tablet   MAR 4/10    Home Med     Treatment:      Other:      Heart failure (HF) can be acute, chronic or both. It is generally further specificed as systolic, diastolic, or combined. Lastly, it is important to identify an underlying etiology if known or suspected.     Common clues to acute exacerbation:  Rapidly progressive symptoms (w/in 2 weeks of presentation), using IV diuretics to treat, using supplemental O2, pulmonary edema on Xray, MI w/in 4 weeks, and/or BNP >500    Systolic Heart Failure: is defined as chart documentation " of a left ventricular ejection fraction (LVEF) less than 40%     Diastolic Heart Failure: is defined as a left ventricular ejection fraction (LVEF) greater than 40%   +      Evidence of diastolic dysfunction on echocardiography OR    Right heart catheterization wedge pressure above 12 mm Hg OR    Left heart catheterization left ventricular end diastolic pressure 18 mm Hg or above.    References: *American Heart Association    The clinical guidelines noted below are only system guidelines, and do not replace the providers clinical judgment.       Provider, please specify the diagnosis associated with above clinical findings    [ X  ] Chronic Diastolic Heart Failure - Pre-existing diastolic HF diagnosis.  EF > 40%  without  acute HF symptoms documented  [   ] Other Type of Heart Failure (please specify type): _________________________  [   ] Heart Failure Ruled Out  [   ] Other (please specify): ___________________________________  [  ] Clinically Undetermined                          Please document in your progress notes daily for the duration of treatment until resolved and include in your discharge summary.

## 2019-04-10 NOTE — PLAN OF CARE
CM met with pt and her daughters today for discharge planning reassessment.    Pt transferred from ICU today.    CM to follow pt/ot recs for discharge.    Pt's daughter, Mary, states pt is going home with her at time of discharge, Mary is taking off work to care for her mother.    CM to follow for plans and arrangement.s     04/10/19 1614   Discharge Assessment   Assessment Type Discharge Planning Reassessment   Confirmed/corrected address and phone number on facesheet? Yes   Assessment information obtained from? Patient;Caregiver;Medical Record   Prior to hospitilization cognitive status: Alert/Oriented   Prior to hospitalization functional status: Independent   Current cognitive status: Alert/Oriented   Current Functional Status: Independent   Lives With alone  (going home with daiughter when discharged)   Able to Return to Prior Arrangements yes   Is patient able to care for self after discharge? Yes  (going home with daughter)   Readmission Within the Last 30 Days no previous admission in last 30 days   Patient currently being followed by outpatient case management? No   Patient currently receives any other outside agency services? No   Equipment Currently Used at Home none   Do you have any problems affording any of your prescribed medications? No   Is the patient taking medications as prescribed? yes   Does the patient have transportation home? Yes

## 2019-04-10 NOTE — PLAN OF CARE
Problem: Adult Inpatient Plan of Care  Goal: Plan of Care Review  Outcome: Ongoing (interventions implemented as appropriate)  Pt remained free from falls or injuries this shift. Pt independent in repositioning. No skin breakdown noticed. Pt rested well through the night. No need for pain meds. Home Losartan started for hypertension. afebrile

## 2019-04-10 NOTE — PLAN OF CARE
Pt discharging home today, family to transport.    Pt is going to her daughter's home in [place], [place]. Daughter is Mary Cuellar.    Therapy notes state pt has no equipment or HH needs for discharge.    Pt and family informed.     04/10/19 1520   Final Note   Assessment Type Final Discharge Note   Anticipated Discharge Disposition Home

## 2019-04-10 NOTE — PT/OT/SLP EVAL
Occupational Therapy   Evaluation and Discharge Note    Name: Pastora Cota  MRN: [id number]  Admitting Diagnosis:  Infectious colitis      Recommendations:     Discharge Recommendations: home  Discharge Equipment Recommendations:  none  Barriers to discharge:  None    Assessment:   Initial OT eval complete.  Step transfer to toilet without AD or DME with supervision for line management.  Donned/doffed socks seated in chair via cross leg tech MOD I.  Functional ambulation in hospital room with supervision for managing line.  No needs identified at this time.  Recommend return home with no post acute therapy or DME needs anticipated; participate with nursing staff for ADL.  To d/c OT services.     Pastora Cota is a 53 y.o. female with a medical diagnosis of Infectious colitis. At this time, patient is functioning at their prior level of function and does not require further acute OT services.     Plan:     During this hospitalization, patient does not require further acute OT services.  Please re-consult if situation changes.    · Plan of Care Reviewed with: patient    Subjective     Chief Complaint: abdominal cramping  Patient/Family Comments/goals: Return home    Occupational Profile:  Lives in [facility] with 3 ENZO R-handrail; walk-in shower and standard toilet.  Independent in ADL, IADL, ambulation, cooking, cleaning, and driving.  Works full time as health .   Equipment Used at home:  none  Assistance upon Discharge: Spouse able to assist if needed.    Pain/Comfort:  · Pain Rating 1: 1/10  · Location 1: abdomen(described pain as cramping)  · Pain Addressed 1: Cessation of Activity  · Pain Rating Post-Intervention 1: 1/10    Patients cultural, spiritual, Congregational conflicts given the current situation: no    Objective:     Communicated with: Nursing prior to session.  Patient found up in chair with peripheral IV upon OT entry to room.    General Precautions: Standard,     Orthopedic  Precautions:N/A   Braces: N/A     Occupational Performance:    Functional Mobility/Transfers:  · Patient completed Sit <> Stand Transfer with independence  with  no assistive device   · Patient completed Toilet Transfer Step Transfer technique with supervision with  no AD  · Functional Mobility: Household ambulation and ambulating in halls with PT (refer to PT note)    Activities of Daily Living:  · Lower Body Dressing: independence to don socks seated at bedside chair    Cognitive/Visual Perceptual:  Cognitive/Psychosocial Skills:  -       Oriented to: Person, Place, Time and Situation   -       Follows Commands/attention:Follows one-step commands and Follows two-step commands  -       Communication: clear/fluent  -       Memory: No Deficits noted  -       Safety awareness/insight to disability: intact   -       Mood/Affect/Coping skills/emotional control: Appropriate to situation  Visual/Perceptual:  -grossly intact      Physical Exam:  Postural examination/scapula alignment: -       No postural abnormalities identified  Skin integrity: Visible skin intact  Edema:  Mild edema to RUE-hand at IV site  Sensation: -       Intact  light/touch to BUE  Dominant hand: -       Right  Upper Extremity Range of Motion:  -       Right Upper Extremity: WFL  -       Left Upper Extremity: WFL  Upper Extremity Strength: -       Right Upper Extremity: WFL  -       Left Upper Extremity: WFL   Strength: -       Right Upper Extremity: WFL  -       Left Upper Extremity: WFL  Fine Motor Coordination:    -       Intact  Left hand thumb/finger opposition skills and Right hand thumb/finger opposition skills    AMPAC 6 Click ADL:  AMPAC Total Score: 24    Treatment & Education:  Role of OT and POC.   Education:    Patient left up in chair with all lines intact, call button in reach and nursing notified    GOALS:   Multidisciplinary Problems     Occupational Therapy Goals     Not on file          Multidisciplinary Problems (Resolved)         Problem: Occupational Therapy Goal    Goal Priority Disciplines Outcome Interventions   Occupational Therapy Goal   (Resolved)     OT, PT/OT Outcome(s) achieved                    History:     Past Medical History:   Diagnosis Date    Abnormal Pap smear     repeat normal    Abnormal Pap smear of cervix     Abnormal Pap smear of vagina     Allergy     seasonal    AR (allergic rhinitis)     Hypercalcemia 9/10/2015    Hypertension     IGT (impaired glucose tolerance) 9/9/2014    Pneumonia 12/2016       Past Surgical History:   Procedure Laterality Date    ABDOMINAL SURGERY      CHOLECYSTECTOMY      1993    COLONOSCOPY N/A 12/27/2017    Performed by Johnny Clayton MD at [facility] ENDO (4TH FLR)    COLONOSCOPY N/A 9/24/2016    Performed by [facility] Clayton [facility] at [facility] ENDO (4TH FLR)    COLPOSCOPY      DILATION AND CURETTAGE OF UTERUS      DRAINAGE N/A 12/28/2016    Performed [facility] CATH LAB    DRAINAGE N/A 12/22/2016    Performed by [facility] CATH LAB    HYSTERECTOMY      2007 tahbso     OOPHORECTOMY      THORACENTESIS N/A 12/20/2016    Performed by [facility] CATH LAB       Time Tracking:     OT Date of Treatment: 04/10/19  OT Start Time: 1022  OT Stop Time: 1033  OT Total Time (min): 11 min    Billable Minutes:Evaluation 11    Yanelis Schultz OT  4/10/2019

## 2019-04-10 NOTE — PLAN OF CARE
Problem: Adult Inpatient Plan of Care  Goal: Plan of Care Review  Outcome: Outcome(s) achieved Date Met: 04/10/19  Pt eager & in agreement w/ DC. Pt denies SOB, N/V and pain. VU of DC instructions and the need to attend follow-up appointment--paperwork passed & explained-- scripts called to pharmacy per MD. IV removed w/ cath tip intact, WNL. Voiding, ambulating, & tolerating PO well.To be DCd home w/ family--will be escorted downstairs via  transport team once dressed, ready & ride arrives.

## 2019-04-11 LAB
BACTERIA BLD CULT: NORMAL
BACTERIA BLD CULT: NORMAL
BACTERIA STL CULT: NORMAL
BACTERIA STL CULT: NORMAL

## 2019-04-12 ENCOUNTER — PATIENT OUTREACH (OUTPATIENT)
Dept: ADMINISTRATIVE | Facility: CLINIC | Age: 54
End: 2019-04-12

## 2019-04-12 ENCOUNTER — TELEPHONE (OUTPATIENT)
Dept: GASTROENTEROLOGY | Facility: CLINIC | Age: 54
End: 2019-04-12

## 2019-04-12 NOTE — TELEPHONE ENCOUNTER
Ma spoke to pt offer 4/25 appt with Dr. Clayton     Pt confirmed appt and thank Ma     Message sent to Yue to schedule this appt     ----- Message from Marisol Montgomery sent at 4/12/2019  4:06 PM CDT -----  Contact: self  [phone]   Forrest    Needs Advice    Reason for call:asking for f/u appt - colitis and sepsis - was in hospital        Communication Preference:[phone]    Additional Information:

## 2019-04-12 NOTE — DISCHARGE SUMMARY
"[facility]  Hospital Medicine  Discharge Summary      Patient Name: Pastora Cota  MRN: [id number]  Admission Date: 4/6/2019  Hospital Length of Stay: 4 days  Discharge Date and Time: 4/10/2019  3:38 PM  Attending Physician: Collin Pabon MD   Discharging Provider: Collin Pabon MD  Primary Care Provider: Kiko Koch MD      HPI:   Ricky Thomas, PA-C:    "Ms. Pastora Cota is a 53 y.o. female, with PMH of HTN, and primary hyperparathyroidism (w/ h/o goiter), who presented to [facility] on 4/6/19 2/2 abdominal pain. She states she ate a yogurt this morning and felt fine all day. She had noted increased urination during the day without dysuria, urgency, post-void pressure, or flank pain. She states she had a bowel movement ~1 PM, which was small and formed and non-bloody. After that time, she began to note increasing diffuse abdominal pain. She denied associated fever, nausea, vomiting and stated she had not been passing gas. She presented to the ED, where imaging showed colitis, as well as fluid and wall thickening of the small bowel lops without transition point, and diffuse wall thickening of the large bowel loops.; all suggestive of enterocolitis. There was associated stranding and inflammatory changes throughout the mesentery without free air or pneumatosis. A lactic acid was most mildly elevated at 2.3. She was given IV fluids, and will be admitted to inpatient status."    Hospital Course:   Ms. Cota is a 53 year old woman with history of hypertension, primary hyperparathyroidism and hyperlipidemia who came in for evaluation of acute onset of abdominal pain.  She was found to have severe sepsis and evidence of colitis with possible mesenteric ischemia verus infectious colitis.  She was admitted and treated with bowel rest, intravenous fluids, intravenous pain medication, and broad-spectrum antibiotics.  She was found to have urinary " retention treated with placement of a Falcon catheter but she subsequenlty passed a voiding trial.  Patient clinically improving and started on oral diet which she tolerated well without issue.  In light of the fact that she had fever and leukocytosis I suspect the patient more likely has infectious colitis as opposed to mesenteric ischemia.  Patient's intravenous antibiotics were switched to oral antibiotics which she tolerated well.  Patient stable to be discharged home on oral antibiotics and advised to follow up with her primary care provider Dr. Kiko Gutierrez.  I also recommend follow-up with Gastroenterology consideration of endoscopic evaluation once acute bout of inflammation subsides.     Consults:   Consults (From admission, onward)        Status Ordering Provider     Inpatient consult to General surgery  Once     Provider:  Topher Nogueira MD    Completed DENIS SIERRA     Inpatient consult to Urology  Once     Provider:  Brian Wilson MD    Completed DENIS SIERRA          Final Active Diagnoses:    Diagnosis Date Noted POA    PRINCIPAL PROBLEM:  Infectious colitis [A09] 04/06/2019 Yes    Hypokalemia [E87.6] 04/07/2019 Yes    Essential hypertension [I10] 01/05/2011 Yes    Hyperlipidemia [E78.5] 03/25/2013 Yes    Urine retention [R33.9] 04/07/2019 Yes    Primary hyperparathyroidism [E21.0] 01/10/2018 Yes      Problems Resolved During this Admission:    Diagnosis Date Noted Date Resolved POA    Severe sepsis [A41.9, R65.20] 04/07/2019 04/09/2019 Yes    Lactic acidosis [E87.2] 04/07/2019 04/09/2019 Yes    Abnormal abdominal CT scan [R93.5] 04/06/2019 04/07/2019 Yes    Mesenteric ischemia [K55.9] 04/06/2019 04/09/2019 Yes       Discharged Condition: Stable    Disposition: Home or Self Care    Follow Up:  Follow-up Information     Schedule an appointment as soon as possible for a visit with Johnny Clayton MD.    Specialty:  Gastroenterology  Why:  Consideration of follow-up  endoscopy following bout of colitis  Contact information:  [address]  [facility] [address]  [phone]             Schedule an appointment as soon as possible for a visit with Kiko Koch MD.    Specialty:  Internal Medicine  Why:  Re-establish outpatient care following bout of sepsis secondary to infectious colitis  Contact information:  [address]  [address]  [facility] [address]  [phone]                 Patient Instructions:      Diet Adult Regular     Notify your health care provider if you experience any of the following:  temperature >100.4     Notify your health care provider if you experience any of the following:  persistent nausea and vomiting or diarrhea     Notify your health care provider if you experience any of the following:  severe uncontrolled pain     Notify your health care provider if you experience any of the following:  difficulty breathing or increased cough     Notify your health care provider if you experience any of the following:  severe persistent headache     Notify your health care provider if you experience any of the following:  worsening rash     Notify your health care provider if you experience any of the following:  persistent dizziness, light-headedness, or visual disturbances     Notify your health care provider if you experience any of the following:  increased confusion or weakness     Activity as tolerated     Medications:  Reconciled Home Medications:      Medication List      START taking these medications    acetaminophen 500 MG tablet  Commonly known as:  TYLENOL  Take 2 tablets (1,000 mg total) by mouth every 6 (six) hours as needed.     ciprofloxacin HCl 500 MG tablet  Commonly known as:  CIPRO  Take 1 tablet (500 mg total) by mouth every 12 (twelve) hours. for 7 days     metroNIDAZOLE 500 MG tablet  Commonly known as:  FLAGYL  Take 1 tablet (500 mg total) by mouth every 8 (eight) hours. for 7 days        CHANGE how you take these  medications    cetirizine 10 MG tablet  Commonly known as:  ZYRTEC  Take 1 tablet (10 mg total) by mouth daily as needed for Allergies.  What changed:  reasons to take this     cholecalciferol (vitamin D3) 50,000 unit capsule  Take 1 capsule (50,000 Units total) by mouth once a week.  What changed:  additional instructions        CONTINUE taking these medications    amLODIPine 10 MG tablet  Commonly known as:  NORVASC  Take 1 tablet (10 mg total) by mouth once daily.     furosemide 20 MG tablet  Commonly known as:  LASIX  Take 1 tablet (20 mg total) by mouth once daily.     losartan 100 MG tablet  Commonly known as:  COZAAR  Take 1 tablet (100 mg total) by mouth every evening.        STOP taking these medications    cyclobenzaprine 5 MG tablet  Commonly known as:  FLEXERIL     hydrocortisone butyrate 0.1 % Crea cream     ibuprofen 600 MG tablet  Commonly known as:  ADVIL,MOTRIN     triamcinolone acetonide 0.1% 0.1 % cream  Commonly known as:  KENALOG            Indwelling Lines/Drains at time of discharge:   Lines/Drains/Airways          None          Time spent on the discharge of patient: 35 minutes  Patient was seen and examined on the date of discharge and determined to be suitable for discharge.         Collin Pabon MD  Department of Hospital Medicine  [facility]

## 2019-04-12 NOTE — PATIENT INSTRUCTIONS
Sepsis     To treat sepsis, antibiotics and fluids may by given through an intravenous (IV) line.     Sepsis happens when your body responds with widespread inflammation to a bad infection or bacteremia--the presence of bacteria in your bloodstream. Sepsis can be deadly. Blood pressure may drop and the lungs and kidneys may start to fail. Emergency care for sepsis is crucial.  Risk factors  Those most at risk for sepsis are:  · Infants or older adults  · People who have an illness that weakens their immune system, such as cancer, AIDS, or diabetes  · People being treated with chemotherapy medicines or radiation, which weakens the immune system  · People who have had a transplant  · People with a very severe infection such as pneumonia, meningitis, or a urinary tract infection  When to go to the emergency department (ED)  Sepsis is an emergency. Go to the nearest ED if you have a fever with any of these symptoms:  · Chills and shaking  · Rapid heartbeat and breathing  · Trouble breathing  · Severe nausea or uncontrolled vomiting  · Confusion, disorientation, drowsiness, or dizziness  · Decreased urination  · Severe pain, including in the back or joints   What to expect in the ED  · Blood and urine tests are done to look for bacteria. They also check for organ failure.  · Blood, urine, or sputum cultures may be taken. The samples are sent to a lab. They are placed in a special container. Any bacteria should grow in 24 hours.  · X-rays or other imaging tests may be done.  A person with sepsis will be admitted to the hospital and treated with antibiotics. Treatment may also include oxygen and intravenous fluids.  Date Last Reviewed: 10/1/2016  © 5875-8849 [employer]. [address] [place] [address], [place] [address]. All rights reserved. This information is not intended as a substitute for professional medical care. Always follow your healthcare professional's instructions.

## 2019-04-15 ENCOUNTER — OFFICE VISIT (OUTPATIENT)
Dept: INTERNAL MEDICINE | Facility: CLINIC | Age: 54
End: 2019-04-15
Attending: INTERNAL MEDICINE
Payer: COMMERCIAL

## 2019-04-15 VITALS
WEIGHT: 164.25 LBS | HEART RATE: 97 BPM | SYSTOLIC BLOOD PRESSURE: 162 MMHG | DIASTOLIC BLOOD PRESSURE: 84 MMHG | OXYGEN SATURATION: 99 % | BODY MASS INDEX: 27.36 KG/M2 | HEIGHT: 65 IN

## 2019-04-15 DIAGNOSIS — I10 BENIGN ESSENTIAL HYPERTENSION: ICD-10-CM

## 2019-04-15 DIAGNOSIS — D64.9 ANEMIA, UNSPECIFIED TYPE: ICD-10-CM

## 2019-04-15 DIAGNOSIS — K52.9 COLITIS: Primary | ICD-10-CM

## 2019-04-15 DIAGNOSIS — K63.3 COLONIC ULCER: ICD-10-CM

## 2019-04-15 DIAGNOSIS — N28.0 RENAL INFARCT: ICD-10-CM

## 2019-04-15 PROCEDURE — 3079F DIAST BP 80-89 MM HG: CPT | Mod: CPTII,S$GLB,, | Performed by: INTERNAL MEDICINE

## 2019-04-15 PROCEDURE — 3079F PR MOST RECENT DIASTOLIC BLOOD PRESSURE 80-89 MM HG: ICD-10-PCS | Mod: CPTII,S$GLB,, | Performed by: INTERNAL MEDICINE

## 2019-04-15 PROCEDURE — 99999 PR PBB SHADOW E&M-EST. PATIENT-LVL III: CPT | Mod: PBBFAC,,, | Performed by: INTERNAL MEDICINE

## 2019-04-15 PROCEDURE — 3077F SYST BP >= 140 MM HG: CPT | Mod: CPTII,S$GLB,, | Performed by: INTERNAL MEDICINE

## 2019-04-15 PROCEDURE — 3008F PR BODY MASS INDEX (BMI) DOCUMENTED: ICD-10-PCS | Mod: CPTII,S$GLB,, | Performed by: INTERNAL MEDICINE

## 2019-04-15 PROCEDURE — 99214 OFFICE O/P EST MOD 30 MIN: CPT | Mod: S$GLB,,, | Performed by: INTERNAL MEDICINE

## 2019-04-15 PROCEDURE — 99214 PR OFFICE/OUTPT VISIT, EST, LEVL IV, 30-39 MIN: ICD-10-PCS | Mod: S$GLB,,, | Performed by: INTERNAL MEDICINE

## 2019-04-15 PROCEDURE — 99999 PR PBB SHADOW E&M-EST. PATIENT-LVL III: ICD-10-PCS | Mod: PBBFAC,,, | Performed by: INTERNAL MEDICINE

## 2019-04-15 PROCEDURE — 3077F PR MOST RECENT SYSTOLIC BLOOD PRESSURE >= 140 MM HG: ICD-10-PCS | Mod: CPTII,S$GLB,, | Performed by: INTERNAL MEDICINE

## 2019-04-15 PROCEDURE — 3008F BODY MASS INDEX DOCD: CPT | Mod: CPTII,S$GLB,, | Performed by: INTERNAL MEDICINE

## 2019-04-15 NOTE — PROGRESS NOTES
"Subjective:       Patient ID: Pastora Cota is a 53 y.o. female.    Chief Complaint: Hospital Follow Up    Here for hospital f/u    Recently admitted for colitis. There was a concern for ischemic. Imaging showed colitis, as well as fluid and wall thickening of the small bowel lops without transition point, and diffuse wall thickening of the large bowel loops.; all suggestive of enterocolitis. There was associated stranding and inflammatory changes throughout the mesentery without free air or pneumatosis. Started on cipro anf flagyl. Initially felt to be ischemic presentation but felt less likely by time of discharge. That being said she was also found to have renal infarcts on lower pole of kidney.         Review of Systems   Constitutional: Negative for chills, fatigue, fever and unexpected weight change.   HENT: Negative for ear pain, hearing loss, postnasal drip, tinnitus, trouble swallowing and voice change.    Respiratory: Negative for cough, chest tightness, shortness of breath and wheezing.    Cardiovascular: Negative for chest pain, palpitations and leg swelling.   Gastrointestinal: Negative for abdominal pain, blood in stool, diarrhea, nausea and vomiting.   Endocrine: Negative for polydipsia, polyphagia and polyuria.   Genitourinary: Negative for difficulty urinating, dysuria, hematuria and vaginal bleeding.   Skin: Negative for rash.   Allergic/Immunologic: Negative for food allergies.   Neurological: Negative for dizziness, numbness and headaches.   Hematological: Does not bruise/bleed easily.   Psychiatric/Behavioral: The patient is not nervous/anxious.        Objective:      Vitals:    04/15/19 1459   BP: (!) 162/84   Pulse: 97   SpO2: 99%   Weight: 74.5 kg (164 lb 3.9 oz)   Height: 5' 5" (1.651 m)      Physical Exam   Constitutional: She is oriented to person, place, and time. She appears well-developed and well-nourished. No distress.   HENT:   Head: Normocephalic and atraumatic. "   Mouth/Throat: Oropharynx is clear and moist. No oropharyngeal exudate.   Eyes: Pupils are equal, round, and reactive to light. Conjunctivae and EOM are normal. No scleral icterus.   Neck: No thyromegaly present.   Cardiovascular: Normal rate, regular rhythm and normal heart sounds.   No murmur heard.  Pulmonary/Chest: Effort normal and breath sounds normal. She has no wheezes. She has no rales.   Abdominal: Soft. She exhibits no distension. There is no tenderness.   Musculoskeletal: She exhibits no edema or tenderness.   Lymphadenopathy:     She has no cervical adenopathy.   Neurological: She is alert and oriented to person, place, and time.   Skin: Skin is warm and dry.   Psychiatric: She has a normal mood and affect. Her behavior is normal.       Assessment:       1. Colitis    2. Renal infarct    3. Benign essential hypertension    4. Anemia, unspecified type    5. Colonic ulcer        Plan:       Pastora was seen today for hospital follow up.    Diagnoses and all orders for this visit:    Colitis    Renal infarct  -     Lactate dehydrogenase; Future  -     HAPTOGLOBIN; Future  -     Transthoracic echo (TTE) complete (Cupid Only); Future  -     NISHA Screen w/Reflex; Future  -     Anti-neutrophilic cytoplasmic antibody; Future  -     Rheumatoid factor; Future  -     HIV 1/2 Ag/Ab (4th Gen); Future  -     Hepatitis B surface antigen; Future  -     Hepatitis C antibody; Future  -     Sedimentation rate; Future  -     C-reactive protein; Future  -     Cyclic citrul peptide antibody, IgG; Future  -     Urinalysis; Future  -     ANTIPHOSPHOLIPID AB (ANTICARDIOLIPIN); Future  -     ANTITHROMBIN III; Future  -     FACTOR 5 LEIDEN; Future  -     PROTHROMBIN GENE MUTATION; Future  -     Homocysteine, serum; Future  -     DIRECT ANTIGLOBULIN TEST; Future  -     Pathologist Interpretation Differential; Future  -     Ambulatory Referral to Hematology / Oncology    Benign essential hypertension    Anemia, unspecified type  -      Lactate dehydrogenase; Future  -     HAPTOGLOBIN; Future  -     Transthoracic echo (TTE) complete (Cupid Only); Future  -     NISHA Screen w/Reflex; Future  -     Anti-neutrophilic cytoplasmic antibody; Future  -     Rheumatoid factor; Future  -     HIV 1/2 Ag/Ab (4th Gen); Future  -     Hepatitis B surface antigen; Future  -     Hepatitis C antibody; Future  -     Sedimentation rate; Future  -     C-reactive protein; Future  -     Cyclic citrul peptide antibody, IgG; Future  -     Urinalysis; Future    Colonic ulcer  -     Transthoracic echo (TTE) complete (Cupid Only); Future  -     NISHA Screen w/Reflex; Future  -     Anti-neutrophilic cytoplasmic antibody; Future  -     Rheumatoid factor; Future  -     HIV 1/2 Ag/Ab (4th Gen); Future  -     Hepatitis B surface antigen; Future  -     Hepatitis C antibody; Future  -     Sedimentation rate; Future  -     C-reactive protein; Future  -     Cyclic citrul peptide antibody, IgG; Future       RTC in6-8 weeks after above labs and heme eval          Side effects of medication(s) were discussed in detail and patient voiced understanding.  Patient will call back for any issues or complications.

## 2019-04-15 NOTE — LETTER
April 15, 2019      [facility] [place] [address] [facility] [address]  [address]  [place] [address]  Phone: [phone]  Fax: [phone]       Patient: Pastora Cota   YOB: 1965  Date of Visit: 04/15/2019    To Whom It May Concern:    Sveta Cota  was at [facility] on 04/15/2019. Please excuse her from 04/06/2019 - 04-  may return to work/school on Monday, 04/22/2019 with no restrictions. If you have any questions or concerns, or if I can be of further assistance, please do not hesitate to contact me.    Sincerely,    Keyona Malone MA

## 2019-04-17 ENCOUNTER — TELEPHONE (OUTPATIENT)
Dept: GASTROENTEROLOGY | Facility: CLINIC | Age: 54
End: 2019-04-17

## 2019-04-17 NOTE — TELEPHONE ENCOUNTER
Ma spoke to pt r/s her on 4/18     Pt confirmed appt and thank Ma       ----- Message from Myron De La Cruz sent at 4/17/2019  9:44 AM CDT -----  Contact: pt   PT missed a call and would like  the nurse to return their call     Pt can be reached at  [phone]

## 2019-04-18 ENCOUNTER — TELEPHONE (OUTPATIENT)
Dept: GASTROENTEROLOGY | Facility: CLINIC | Age: 54
End: 2019-04-18

## 2019-04-18 ENCOUNTER — OFFICE VISIT (OUTPATIENT)
Dept: GASTROENTEROLOGY | Facility: CLINIC | Age: 54
End: 2019-04-18
Payer: COMMERCIAL

## 2019-04-18 VITALS
WEIGHT: 162.25 LBS | HEIGHT: 65 IN | HEART RATE: 89 BPM | BODY MASS INDEX: 27.03 KG/M2 | SYSTOLIC BLOOD PRESSURE: 152 MMHG | DIASTOLIC BLOOD PRESSURE: 93 MMHG

## 2019-04-18 DIAGNOSIS — K52.9 ENTEROCOLITIS: Primary | ICD-10-CM

## 2019-04-18 PROCEDURE — 3080F PR MOST RECENT DIASTOLIC BLOOD PRESSURE >= 90 MM HG: ICD-10-PCS | Mod: CPTII,S$GLB,, | Performed by: INTERNAL MEDICINE

## 2019-04-18 PROCEDURE — 99213 OFFICE O/P EST LOW 20 MIN: CPT | Mod: S$GLB,,, | Performed by: INTERNAL MEDICINE

## 2019-04-18 PROCEDURE — 3077F PR MOST RECENT SYSTOLIC BLOOD PRESSURE >= 140 MM HG: ICD-10-PCS | Mod: CPTII,S$GLB,, | Performed by: INTERNAL MEDICINE

## 2019-04-18 PROCEDURE — 3008F BODY MASS INDEX DOCD: CPT | Mod: CPTII,S$GLB,, | Performed by: INTERNAL MEDICINE

## 2019-04-18 PROCEDURE — 99999 PR PBB SHADOW E&M-EST. PATIENT-LVL III: CPT | Mod: PBBFAC,,, | Performed by: INTERNAL MEDICINE

## 2019-04-18 PROCEDURE — 3008F PR BODY MASS INDEX (BMI) DOCUMENTED: ICD-10-PCS | Mod: CPTII,S$GLB,, | Performed by: INTERNAL MEDICINE

## 2019-04-18 PROCEDURE — 99999 PR PBB SHADOW E&M-EST. PATIENT-LVL III: ICD-10-PCS | Mod: PBBFAC,,, | Performed by: INTERNAL MEDICINE

## 2019-04-18 PROCEDURE — 99213 PR OFFICE/OUTPT VISIT, EST, LEVL III, 20-29 MIN: ICD-10-PCS | Mod: S$GLB,,, | Performed by: INTERNAL MEDICINE

## 2019-04-18 PROCEDURE — 3080F DIAST BP >= 90 MM HG: CPT | Mod: CPTII,S$GLB,, | Performed by: INTERNAL MEDICINE

## 2019-04-18 PROCEDURE — 3077F SYST BP >= 140 MM HG: CPT | Mod: CPTII,S$GLB,, | Performed by: INTERNAL MEDICINE

## 2019-04-18 NOTE — PROGRESS NOTES
[facility] Established Patient Visit    Reason for Visit:  The encounter diagnosis was Enterocolitis.    PCP: Kiko Koch    HPI:  This is a 53 y.o. female here for hospital followup.  Had acute abdominal pain at work but no diarrhea but eventually progressed to diarrhea  No sick contacts  CT showed enterocolitis had yogurt earlier that day before    ROS:  Constitutional: No fevers, chills, weight loss  ENT: No allergies  CV: No chest pain  Pulm: No cough, shortness of breath  Ophtho: No vision changes  GI: see HPI  Derm: No rash  Heme: No lymphadenopathy  MSK: No arthritis  : had some urinary retention in hospital  Endo: No hot or cold intolerance  Neuro: No syncope, no seizure  Psych: No anxiety, no depression    PMHX:  has a past medical history of Abnormal Pap smear, Abnormal Pap smear of cervix, Abnormal Pap smear of vagina, Allergy, AR (allergic rhinitis), Hypercalcemia (9/10/2015), Hypertension, IGT (impaired glucose tolerance) (9/9/2014), and Pneumonia (12/2016).    PSHX:  has a past surgical history that includes Colposcopy; Hysterectomy; Cholecystectomy; Dilation and curettage of uterus; Oophorectomy; Colonoscopy (N/A, 9/24/2016); Abdominal surgery; and Colonoscopy (N/A, 12/27/2017).    The patient's social and family histories were reviewed by me and updated in the appropriate section of the electronic medical record.    Review of patient's allergies indicates:   Allergen Reactions    Sulfa (sulfonamide antibiotics) Hives and Edema       Current Outpatient Medications   Medication Sig Dispense Refill    amLODIPine (NORVASC) 10 MG tablet Take 1 tablet (10 mg total) by mouth once daily. 90 tablet 1    cholecalciferol, vitamin D3, 50,000 unit capsule Take 1 capsule (50,000 Units total) by mouth once a week. (Patient taking differently: Take 50,000 Units by mouth once a week. On Thursday) 12 capsule 3    furosemide (LASIX) 20 MG tablet Take 1 tablet (20 mg total) by mouth once  "daily. 90 tablet 1    losartan (COZAAR) 100 MG tablet Take 1 tablet (100 mg total) by mouth every evening.      acetaminophen (TYLENOL) 500 MG tablet Take 2 tablets (1,000 mg total) by mouth every 6 (six) hours as needed.  0    cetirizine (ZYRTEC) 10 MG tablet Take 1 tablet (10 mg total) by mouth daily as needed for Allergies.  0     No current facility-administered medications for this visit.          Objective Findings:    Vital Signs:  BP (!) 152/93 Comment: pt stated didn't take b/p meds today  Pulse 89   Ht 5' 5" (1.651 m)   Wt 73.6 kg (162 lb 4.1 oz)   BMI 27.00 kg/m²  Body mass index is 27 kg/m².    Physical Exam:  General Appearance: Well appearing in no acute distress  Head:   Normocephalic, without obvious abnormality  Eyes:    No scleral icterus, EOMI  Throat: Lips, mucosa, and tongue normal; teeth and gums normal  Lungs: CTA bilaterally in anterior and posterior fields, no wheezes, no crackles.  Heart:  Regular rate and rhythm, S1, S2 normal, no murmurs heard  Abdomen: Soft, non tender, non distended with positive bowel sounds in all four quadrants. No hepatosplenomegaly, ascites, or mass  Extremities:    2+ pulses, no clubbing, cyanosis or edema  Skin: No rash  Neurologic: CN II-XII intact, no asterixis      Labs:  Lab Results   Component Value Date    WBC 9.50 04/10/2019    HGB 10.4 (L) 04/10/2019    HCT 32.1 (L) 04/10/2019     04/10/2019    CHOL 130 04/07/2019    TRIG 32 04/07/2019    HDL 38 (L) 04/07/2019    ALT 30 04/06/2019    ALT 30 04/06/2019    AST 24 04/06/2019    AST 24 04/06/2019     04/10/2019    K 3.9 04/10/2019     04/10/2019    CREATININE 0.8 04/10/2019    BUN 8 04/10/2019    CO2 23 04/10/2019    TSH 0.632 08/15/2018    INR 1.0 04/06/2019    HGBA1C 5.5 04/07/2019       Endoscopy:   Colon 2017 - healed NSAID ulcer    Imaging:  CT - Low-attenuation wedge-shaped areas in the lower pole of the left kidney, most consistent with renal infarctions.  The esophagus, " stomach, and duodenum are within normal limits.  There is fluid distention and wall thickening involving the small bowel loops.  No transition point is identified.  The appendix is not consistently identified.  There are no secondary findings of acute appendicitis.  There is diffuse wall thickening involving the large bowel loops.    Assessment:    1. Enterocolitis      Picture sounds like an acute enterocolitis (possibly bad yogurt?) and does not fit the distribution of an ischemic colitis. Renal infarcts are noted. She denies hypotension in the hospital.    Recommendations:  1. For right now ok to watch as she is already feeling better after antibiotics  2. No need for repeat colon at this time given recent normal colon. Only if symptoms fail to resolve  3. Ok to slowly go back to high fiber diet when her gi symptoms are back to baseline. This was discussed today    Follow up if symptoms worsen or fail to improve.    Order summary:  No orders of the defined types were placed in this encounter.      Thank you for allowing me to participate in the care of Pastora Clayton MD

## 2019-04-22 ENCOUNTER — TELEPHONE (OUTPATIENT)
Dept: INTERNAL MEDICINE | Facility: CLINIC | Age: 54
End: 2019-04-22

## 2019-04-22 DIAGNOSIS — N28.0 RENAL INFARCTION: Primary | ICD-10-CM

## 2019-04-22 NOTE — TELEPHONE ENCOUNTER
Please contact pt to schedule CTA abd pelvis (let her know this is slightly different than one she recently had done) as well as urine and blood test.

## 2019-04-22 NOTE — TELEPHONE ENCOUNTER
----- Message from Emory Boo sent at 4/22/2019 10:16 AM CDT -----  Contact: SID GARCIA [[id number]]  Name of Who is Calling: SID GARCIA [[id number]]    What is the request in detail: patient would like to follow up on if paperwork is completed. Please advise      Can the clinic reply by [facility]: no      [facility] [phone]

## 2019-04-25 ENCOUNTER — HOSPITAL ENCOUNTER (OUTPATIENT)
Dept: RADIOLOGY | Facility: CLINIC | Age: 54
Discharge: HOME | End: 2019-04-25
Attending: INTERNAL MEDICINE
Payer: COMMERCIAL

## 2019-04-25 ENCOUNTER — HOSPITAL ENCOUNTER (OUTPATIENT)
Dept: ENDOCRINOLOGY | Facility: CLINIC | Age: 54
Discharge: HOME | End: 2019-04-25
Attending: INTERNAL MEDICINE
Payer: COMMERCIAL

## 2019-04-25 ENCOUNTER — TELEPHONE (OUTPATIENT)
Dept: INTERNAL MEDICINE | Facility: CLINIC | Age: 54
End: 2019-04-25

## 2019-04-25 ENCOUNTER — PATIENT MESSAGE (OUTPATIENT)
Dept: INTERNAL MEDICINE | Facility: CLINIC | Age: 54
End: 2019-04-25

## 2019-04-25 DIAGNOSIS — M85.80 OSTEOPENIA, UNSPECIFIED LOCATION: ICD-10-CM

## 2019-04-25 DIAGNOSIS — E21.0 PRIMARY HYPERPARATHYROIDISM: ICD-10-CM

## 2019-04-25 PROCEDURE — 77080 DXA BONE DENSITY AXIAL: CPT | Mod: TC

## 2019-04-25 PROCEDURE — 77080 DEXA BONE DENSITY SPINE HIP: ICD-10-PCS | Mod: 26,,, | Performed by: INTERNAL MEDICINE

## 2019-04-25 PROCEDURE — 76536 US EXAM OF HEAD AND NECK: CPT | Mod: S$GLB,,, | Performed by: INTERNAL MEDICINE

## 2019-04-25 PROCEDURE — 76536 US SOFT TISSUE HEAD NECK THYROID: ICD-10-PCS | Mod: S$GLB,,, | Performed by: INTERNAL MEDICINE

## 2019-04-25 PROCEDURE — 77080 DXA BONE DENSITY AXIAL: CPT | Mod: 26,,, | Performed by: INTERNAL MEDICINE

## 2019-04-29 ENCOUNTER — TELEPHONE (OUTPATIENT)
Dept: HEMATOLOGY/ONCOLOGY | Facility: CLINIC | Age: 54
End: 2019-04-29

## 2019-04-29 RX ORDER — METOPROLOL TARTRATE 25 MG/1
12.5 TABLET, FILM COATED ORAL 2 TIMES DAILY
Qty: 45 TABLET | Refills: 0 | Status: SHIPPED | OUTPATIENT
Start: 2019-04-29 | End: 2019-06-12 | Stop reason: SDUPTHER

## 2019-05-02 ENCOUNTER — TELEPHONE (OUTPATIENT)
Dept: ENDOCRINOLOGY | Facility: CLINIC | Age: 54
End: 2019-05-02

## 2019-05-02 NOTE — TELEPHONE ENCOUNTER
----- Message from Lu Win sent at 5/2/2019 10:29 AM CDT -----  Contact: Paige @ [facility] / [phone]  Paige has not been reach the PT and wants to us to provide her contact info to PT.

## 2019-05-20 DIAGNOSIS — I10 HYPERTENSION, UNSPECIFIED TYPE: ICD-10-CM

## 2019-05-20 RX ORDER — FUROSEMIDE 20 MG/1
20 TABLET ORAL DAILY
Qty: 90 TABLET | Refills: 1 | Status: SHIPPED | OUTPATIENT
Start: 2019-05-20 | End: 2019-10-28 | Stop reason: SDUPTHER

## 2019-05-20 RX ORDER — AMLODIPINE BESYLATE 10 MG/1
10 TABLET ORAL DAILY
Qty: 90 TABLET | Refills: 1 | Status: SHIPPED | OUTPATIENT
Start: 2019-05-20 | End: 2019-10-28 | Stop reason: SDUPTHER

## 2019-05-24 ENCOUNTER — PATIENT MESSAGE (OUTPATIENT)
Dept: ENDOCRINOLOGY | Facility: CLINIC | Age: 54
End: 2019-05-24

## 2019-05-24 DIAGNOSIS — E04.2 MULTINODULAR GOITER: Primary | ICD-10-CM

## 2019-05-24 NOTE — TELEPHONE ENCOUNTER
bmd shows osteoporosis - we should set up a visit to review options  -Not urgent     Also ultrasound shows a left thyroid nodule that needs a biopsy    Orders in    pc to let her know and to  arrange

## 2019-05-28 NOTE — TELEPHONE ENCOUNTER
Patient called back. Notified of results. FNA scheduled June 5th for 3:15PM.   Will schedule follow up visit as soon as we get an opening.  Encouraged to continue taking Vitamin D to aid in calcium absorption

## 2019-05-28 NOTE — TELEPHONE ENCOUNTER
Tried calling, no answer and voicemailbox was full. Could not leave a message.  Will send a pt portal message asking patient to call us regarding her results and follow up.

## 2019-06-04 ENCOUNTER — TELEPHONE (OUTPATIENT)
Dept: INTERNAL MEDICINE | Facility: CLINIC | Age: 54
End: 2019-06-04

## 2019-06-04 ENCOUNTER — CLINICAL SUPPORT (OUTPATIENT)
Dept: INTERNAL MEDICINE | Facility: CLINIC | Age: 54
End: 2019-06-04
Payer: COMMERCIAL

## 2019-06-04 VITALS — DIASTOLIC BLOOD PRESSURE: 84 MMHG | HEART RATE: 77 BPM | OXYGEN SATURATION: 97 % | SYSTOLIC BLOOD PRESSURE: 160 MMHG

## 2019-06-04 DIAGNOSIS — I10 BENIGN ESSENTIAL HYPERTENSION: Primary | ICD-10-CM

## 2019-06-04 PROCEDURE — 99999 PR PBB SHADOW E&M-EST. PATIENT-LVL III: ICD-10-PCS | Mod: PBBFAC,,,

## 2019-06-04 PROCEDURE — 99999 PR PBB SHADOW E&M-EST. PATIENT-LVL III: CPT | Mod: PBBFAC,,,

## 2019-06-04 NOTE — Clinical Note
Does patient have record of home blood pressure readings no. Readings have been averaging unknown. Last dose of blood pressure medication was taken at 1130am.Patient is asymptomatic.   , Pulse: (P) 77 .Blood pressure reading after 15 minutes was 160/84, Pulse 77.

## 2019-06-04 NOTE — PROGRESS NOTES
Pastora Cota 53 y.o. female is here today for Blood Pressure check.   History of HTN yes.    Review of patient's allergies indicates:   Allergen Reactions    Sulfa (sulfonamide antibiotics) Hives and Edema     Creatinine   Date Value Ref Range Status   04/10/2019 0.8 0.5 - 1.4 mg/dL Final     Sodium   Date Value Ref Range Status   04/10/2019 140 136 - 145 mmol/L Final     Potassium   Date Value Ref Range Status   04/10/2019 3.9 3.5 - 5.1 mmol/L Final   ]  Patient verifies taking blood pressure medications on a regular basis at the same time of the day.     Current Outpatient Medications:     acetaminophen (TYLENOL) 500 MG tablet, Take 2 tablets (1,000 mg total) by mouth every 6 (six) hours as needed., Disp: , Rfl: 0    amLODIPine (NORVASC) 10 MG tablet, Take 1 tablet (10 mg total) by mouth once daily., Disp: 90 tablet, Rfl: 1    cetirizine (ZYRTEC) 10 MG tablet, Take 1 tablet (10 mg total) by mouth daily as needed for Allergies., Disp: , Rfl: 0    cholecalciferol, vitamin D3, 50,000 unit capsule, Take 1 capsule (50,000 Units total) by mouth once a week. (Patient taking differently: Take 50,000 Units by mouth once a week. On Thursday), Disp: 12 capsule, Rfl: 3    furosemide (LASIX) 20 MG tablet, Take 1 tablet (20 mg total) by mouth once daily., Disp: 90 tablet, Rfl: 1    losartan (COZAAR) 100 MG tablet, Take 1 tablet (100 mg total) by mouth every evening., Disp: , Rfl:     metoprolol tartrate (LOPRESSOR) 25 MG tablet, Take 0.5 tablets (12.5 mg total) by mouth 2 (two) times daily., Disp: 45 tablet, Rfl: 0  Does patient have record of home blood pressure readings no. Readings have been averaging unknown.   Last dose of blood pressure medication was taken at 1130am.  Patient is asymptomatic.       , Pulse: (P) 77 .    Blood pressure reading after 15 minutes was 160/84  , Pulse 77.  Dr. Koch notified.

## 2019-06-04 NOTE — TELEPHONE ENCOUNTER
Does patient have record of home blood pressure readings no. Readings have been averaging unknown.   Last dose of blood pressure medication was taken at 1130am.  Patient is asymptomatic.       , Pulse: (P) 77 .    Blood pressure reading after 15 minutes was 160/84  , Pulse 77.

## 2019-06-05 ENCOUNTER — HOSPITAL ENCOUNTER (OUTPATIENT)
Dept: ENDOCRINOLOGY | Facility: CLINIC | Age: 54
Discharge: HOME | End: 2019-06-05
Attending: INTERNAL MEDICINE
Payer: COMMERCIAL

## 2019-06-05 DIAGNOSIS — E04.2 MULTINODULAR GOITER: ICD-10-CM

## 2019-06-05 PROCEDURE — 88173 CYTOPATH EVAL FNA REPORT: CPT | Mod: 26,,, | Performed by: PATHOLOGY

## 2019-06-05 PROCEDURE — 10005 FNA BX W/US GDN 1ST LES: CPT | Mod: S$GLB,,, | Performed by: INTERNAL MEDICINE

## 2019-06-05 PROCEDURE — 88173 CYTOLOGY SPECIMEN-FNA NON-RADIOLOGY CLINICIAN PERFORMED W/O ON SITE: ICD-10-PCS | Mod: 26,,, | Performed by: PATHOLOGY

## 2019-06-05 PROCEDURE — 10005 FNA BX W/US GDN 1ST LES: CPT

## 2019-06-05 PROCEDURE — 10005 US FINE NEEDLE ASPIRATION BIOPSY, FIRST LESION: ICD-10-PCS | Mod: S$GLB,,, | Performed by: INTERNAL MEDICINE

## 2019-06-05 PROCEDURE — 88173 CYTOPATH EVAL FNA REPORT: CPT | Performed by: PATHOLOGY

## 2019-06-05 RX ORDER — SPIRONOLACTONE 25 MG/1
25 TABLET ORAL DAILY
Qty: 90 TABLET | Refills: 2 | Status: SHIPPED | OUTPATIENT
Start: 2019-06-05 | End: 2020-02-27 | Stop reason: SDUPTHER

## 2019-06-05 NOTE — PROGRESS NOTES
Please  Cota know I have sent in medicaiton called spirinolactone for her to start and come back for nruse visit for BP check and lab draw after starting to ensure potassium remains normal

## 2019-06-12 ENCOUNTER — PATIENT MESSAGE (OUTPATIENT)
Dept: INTERNAL MEDICINE | Facility: CLINIC | Age: 54
End: 2019-06-12

## 2019-06-12 DIAGNOSIS — I10 ESSENTIAL HYPERTENSION: Primary | ICD-10-CM

## 2019-06-12 RX ORDER — METOPROLOL TARTRATE 25 MG/1
12.5 TABLET, FILM COATED ORAL 2 TIMES DAILY
Qty: 45 TABLET | Refills: 2 | Status: SHIPPED | OUTPATIENT
Start: 2019-06-12 | End: 2019-10-29 | Stop reason: SDUPTHER

## 2019-06-12 RX ORDER — LOSARTAN POTASSIUM 100 MG/1
100 TABLET ORAL NIGHTLY
Qty: 90 TABLET | Refills: 3
Start: 2019-06-12 | End: 2019-06-24 | Stop reason: SDUPTHER

## 2019-06-14 ENCOUNTER — TELEPHONE (OUTPATIENT)
Dept: ENDOCRINOLOGY | Facility: HOSPITAL | Age: 54
End: 2019-06-14

## 2019-06-24 DIAGNOSIS — I10 ESSENTIAL HYPERTENSION: ICD-10-CM

## 2019-06-24 NOTE — TELEPHONE ENCOUNTER
----- Message from Garima Mesa sent at 6/24/2019 11:01 AM CDT -----  Contact: pt  Name of Who is Calling: Pastora      What is the request in detail: pt calling to inform that the losartan (COZAAR) 100 MG tablet, has till not been received at her pharmacy. Please send medication to [facility] on  and call to advise the pt      Can the clinic reply by [facility]: yes      What Number to Call Back if not in [facility]: [phone]

## 2019-06-25 RX ORDER — LOSARTAN POTASSIUM 100 MG/1
100 TABLET ORAL NIGHTLY
Qty: 90 TABLET | Refills: 3
Start: 2019-06-25 | End: 2019-06-26 | Stop reason: SDUPTHER

## 2019-06-26 DIAGNOSIS — I10 ESSENTIAL HYPERTENSION: ICD-10-CM

## 2019-06-26 RX ORDER — LOSARTAN POTASSIUM 100 MG/1
100 TABLET ORAL NIGHTLY
Qty: 90 TABLET | Refills: 3
Start: 2019-06-26 | End: 2019-06-27 | Stop reason: SDUPTHER

## 2019-06-26 NOTE — TELEPHONE ENCOUNTER
----- Message from Homer Jean Baptiste sent at 6/26/2019  9:28 AM CDT -----  Contact: SID GARCIA [[id number]]  Can the clinic reply in [facility]:      Please refill the medication(s) listed below. Please call the patient when the prescription(s) is ready for  at the phone number    Medication #1:losartan (COZAAR) 100 MG tablet    Medication #2:  Never sent to Pharmacy     Preferred Pharmacy:[facility] [address] [place] [address]

## 2019-06-27 DIAGNOSIS — I10 ESSENTIAL HYPERTENSION: ICD-10-CM

## 2019-06-27 RX ORDER — LOSARTAN POTASSIUM 100 MG/1
100 TABLET ORAL NIGHTLY
Qty: 90 TABLET | Refills: 3 | Status: SHIPPED | OUTPATIENT
Start: 2019-06-27 | End: 2020-07-07 | Stop reason: SDUPTHER

## 2019-06-27 NOTE — TELEPHONE ENCOUNTER
----- Message from Daniela Copeland sent at 6/27/2019  9:38 AM CDT -----  Contact: Pt    Name of Who is Calling:SID GARCIA [[id number]]    What is the request in detail: patient would like a call back regarding  losartan (COZAAR) 100 MG tablet being sent to the pharmacy . Please contact to further discuss and advise    Can the clinic reply by [facility]: No    What Number to Call Back if not [facility]: [phone]

## 2019-06-27 NOTE — TELEPHONE ENCOUNTER
----- Message from Elizabeth Song sent at 6/27/2019 11:49 AM CDT -----  Contact: Pt   Name of Who is Calling: SID GARCIA [[id number]]    What is the request in detail:Pt is requesting a call back regarding her medication losartan (COZAAR) 100 MG tablet pt still haven't received it.... Please contact to further discuss and advise      Can the clinic reply by [facility]: Yes     What Number to Call Back [facility] not [facility]: [phone]

## 2019-07-05 ENCOUNTER — HOSPITAL ENCOUNTER (OUTPATIENT)
Dept: CARDIOLOGY | Facility: OTHER | Age: 54
Discharge: HOME | End: 2019-07-05
Attending: INTERNAL MEDICINE
Payer: COMMERCIAL

## 2019-07-05 ENCOUNTER — OFFICE VISIT (OUTPATIENT)
Dept: OPTOMETRY | Facility: CLINIC | Age: 54
End: 2019-07-05
Payer: COMMERCIAL

## 2019-07-05 ENCOUNTER — OFFICE VISIT (OUTPATIENT)
Dept: INTERNAL MEDICINE | Facility: CLINIC | Age: 54
End: 2019-07-05
Attending: INTERNAL MEDICINE
Payer: COMMERCIAL

## 2019-07-05 VITALS
HEART RATE: 68 BPM | BODY MASS INDEX: 26.77 KG/M2 | HEIGHT: 65 IN | SYSTOLIC BLOOD PRESSURE: 130 MMHG | OXYGEN SATURATION: 97 % | DIASTOLIC BLOOD PRESSURE: 84 MMHG | WEIGHT: 160.69 LBS

## 2019-07-05 DIAGNOSIS — R00.2 PALPITATIONS: ICD-10-CM

## 2019-07-05 DIAGNOSIS — I10 ESSENTIAL HYPERTENSION: ICD-10-CM

## 2019-07-05 DIAGNOSIS — N28.0 RENAL INFARCT: ICD-10-CM

## 2019-07-05 DIAGNOSIS — H43.393 VITREOUS FLOATERS OF BOTH EYES: Primary | ICD-10-CM

## 2019-07-05 DIAGNOSIS — H52.4 PRESBYOPIA: ICD-10-CM

## 2019-07-05 DIAGNOSIS — E21.0 PRIMARY HYPERPARATHYROIDISM: ICD-10-CM

## 2019-07-05 DIAGNOSIS — D64.9 ANEMIA, UNSPECIFIED TYPE: Primary | ICD-10-CM

## 2019-07-05 DIAGNOSIS — H11.153 PINGUECULA, BILATERAL: ICD-10-CM

## 2019-07-05 DIAGNOSIS — E04.2 MULTINODULAR GOITER: ICD-10-CM

## 2019-07-05 PROBLEM — M81.0 OSTEOPOROSIS: Status: ACTIVE | Noted: 2019-03-07

## 2019-07-05 PROCEDURE — 3075F PR MOST RECENT SYSTOLIC BLOOD PRESS GE 130-139MM HG: ICD-10-PCS | Mod: CPTII,S$GLB,, | Performed by: INTERNAL MEDICINE

## 2019-07-05 PROCEDURE — 99999 PR PBB SHADOW E&M-EST. PATIENT-LVL II: CPT | Mod: PBBFAC,,, | Performed by: OPTOMETRIST

## 2019-07-05 PROCEDURE — 3008F BODY MASS INDEX DOCD: CPT | Mod: CPTII,S$GLB,, | Performed by: INTERNAL MEDICINE

## 2019-07-05 PROCEDURE — 99999 PR PBB SHADOW E&M-EST. PATIENT-LVL III: CPT | Mod: PBBFAC,,, | Performed by: INTERNAL MEDICINE

## 2019-07-05 PROCEDURE — 92014 PR EYE EXAM, EST PATIENT,COMPREHESV: ICD-10-PCS | Mod: S$GLB,,, | Performed by: OPTOMETRIST

## 2019-07-05 PROCEDURE — 3079F PR MOST RECENT DIASTOLIC BLOOD PRESSURE 80-89 MM HG: ICD-10-PCS | Mod: CPTII,S$GLB,, | Performed by: INTERNAL MEDICINE

## 2019-07-05 PROCEDURE — 99214 PR OFFICE/OUTPT VISIT, EST, LEVL IV, 30-39 MIN: ICD-10-PCS | Mod: S$GLB,,, | Performed by: INTERNAL MEDICINE

## 2019-07-05 PROCEDURE — 3075F SYST BP GE 130 - 139MM HG: CPT | Mod: CPTII,S$GLB,, | Performed by: INTERNAL MEDICINE

## 2019-07-05 PROCEDURE — 99999 PR PBB SHADOW E&M-EST. PATIENT-LVL II: ICD-10-PCS | Mod: PBBFAC,,, | Performed by: OPTOMETRIST

## 2019-07-05 PROCEDURE — 99214 OFFICE O/P EST MOD 30 MIN: CPT | Mod: S$GLB,,, | Performed by: INTERNAL MEDICINE

## 2019-07-05 PROCEDURE — 3008F PR BODY MASS INDEX (BMI) DOCUMENTED: ICD-10-PCS | Mod: CPTII,S$GLB,, | Performed by: INTERNAL MEDICINE

## 2019-07-05 PROCEDURE — 92014 COMPRE OPH EXAM EST PT 1/>: CPT | Mod: S$GLB,,, | Performed by: OPTOMETRIST

## 2019-07-05 PROCEDURE — 99999 PR PBB SHADOW E&M-EST. PATIENT-LVL III: ICD-10-PCS | Mod: PBBFAC,,, | Performed by: INTERNAL MEDICINE

## 2019-07-05 PROCEDURE — 3079F DIAST BP 80-89 MM HG: CPT | Mod: CPTII,S$GLB,, | Performed by: INTERNAL MEDICINE

## 2019-07-05 RX ORDER — ASPIRIN 325 MG
50000 TABLET, DELAYED RELEASE (ENTERIC COATED) ORAL WEEKLY
Qty: 4 CAPSULE | Refills: 3 | Status: SHIPPED | OUTPATIENT
Start: 2019-07-05 | End: 2019-10-29 | Stop reason: SDUPTHER

## 2019-07-05 RX ORDER — HYDROXYZINE HYDROCHLORIDE 25 MG/1
25 TABLET, FILM COATED ORAL 3 TIMES DAILY
Qty: 45 TABLET | Refills: 1 | Status: SHIPPED | OUTPATIENT
Start: 2019-07-05 | End: 2021-01-28

## 2019-07-05 NOTE — PROGRESS NOTES
Subjective:       Patient ID: Pastora Cota is a 53 y.o. female      Chief Complaint   Patient presents with    Concerns About Ocular Health    Hypertensive Eye Exam     History of Present Illness  Dls: 11/19/17 Dr. Umanzor     54 y/o female presents today for hypertensive eye exam.   Pt states x 1-2 months floaters ou off/on.   Pt states no changes in vision.   Pt wears otc readers.     + os tearing  No itching  No burning  No pain  + ha's  + flashes    Eye meds  otc gtts ou prn          Assessment/Plan:     1. Vitreous floaters of both eyes  Discussed causes of flashes and floaters with the patient and described the warnings of a possible retinal detachment. Advised patient to call if there is an increase in flashes or floaters.    2. Pinguecula, bilateral  Asymptomatic. Monitor.    3. Presbyopia  Declined MRx. Readers PRN.    Follow up in about 1 year (around 7/5/2020).

## 2019-07-05 NOTE — PROGRESS NOTES
Subjective:       Patient ID: Pastora Cota is a 53 y.o. female.    Chief Complaint: Hypertension    Here for urgent visit    Woke last night in the middle of the night with palpitations that took several minutes to stop. She was panicky and had to consciously calm herself down and breathe calmly. She does geraldine her overall stress levels are very high. The past year she has had multiple medical complication and diagnosis such as infectious gastroenteritis, renal infarcts seen on imaging, thyroid nodules required Bx and was benign, PNA complicated by effusion requiring chest tube, progression of osteopenia to osteoporosis in the setting of hyperparathyroidism. Also her parent's are aging and not doing well and this has also been a stressor. Of note she does recall a diagnosis of thalassemia in the past.           Hypertension   Associated symptoms include anxiety, blurred vision, headaches, palpitations and sweats. Pertinent negatives include no chest pain or shortness of breath. There are no associated agents to hypertension. There are no known risk factors for coronary artery disease. Past treatments include nothing. The current treatment provides moderate improvement. Compliance problems include exercise.        Review of Systems   Constitutional: Negative for chills, fatigue, fever and unexpected weight change.   HENT: Negative for ear pain, hearing loss, postnasal drip, tinnitus, trouble swallowing and voice change.    Eyes: Positive for blurred vision.   Respiratory: Negative for cough, chest tightness, shortness of breath and wheezing.    Cardiovascular: Positive for palpitations. Negative for chest pain and leg swelling.   Gastrointestinal: Negative for abdominal pain, blood in stool, diarrhea, nausea and vomiting.   Endocrine: Negative for polydipsia, polyphagia and polyuria.   Genitourinary: Negative for difficulty urinating, dysuria, hematuria and vaginal bleeding.   Skin: Negative for rash.  "  Allergic/Immunologic: Negative for food allergies.   Neurological: Positive for headaches. Negative for dizziness and numbness.   Hematological: Does not bruise/bleed easily.   Psychiatric/Behavioral: The patient is nervous/anxious.        Objective:      Vitals:    07/05/19 0809   BP: 130/84   Pulse: 68   SpO2: 97%   Weight: 72.9 kg (160 lb 11.5 oz)   Height: 5' 5" (1.651 m)      Physical Exam   Constitutional: She is oriented to person, place, and time. She appears well-developed and well-nourished. No distress.   HENT:   Head: Normocephalic and atraumatic.   Mouth/Throat: Oropharynx is clear and moist. No oropharyngeal exudate.   Eyes: Pupils are equal, round, and reactive to light. Conjunctivae and EOM are normal. No scleral icterus.   Neck: No thyromegaly present.   Cardiovascular: Normal rate, regular rhythm and normal heart sounds.   No murmur heard.  Pulmonary/Chest: Effort normal and breath sounds normal. She has no wheezes. She has no rales.   Abdominal: Soft. She exhibits no distension. There is no tenderness.   Musculoskeletal: She exhibits no edema or tenderness.   Lymphadenopathy:     She has no cervical adenopathy.   Neurological: She is alert and oriented to person, place, and time.   Skin: Skin is warm and dry.   Psychiatric: She has a normal mood and affect. Her behavior is normal.       Assessment:       1. Anemia, unspecified type    2. Multinodular goiter    3. Primary hyperparathyroidism    4. Essential hypertension    5. Palpitations    6. Renal infarct        Plan:       Pastora was seen today for hypertension.    Diagnoses and all orders for this visit:    Anemia, unspecified type  profiel consistent with alpha thal. Will confirm.  -     Ferritin; Future  -     CBC auto differential; Future  -     Iron and TIBC; Future  -     Thalasseima and Hemoglobinopathy Eval; Future    Multinodular goiter  -     TSH; Future  -     SCHEDULED EKG 12-LEAD (to Muse); Future    Primary " hyperparathyroidism  Has appt with endocrine 10/2019. Will likely proceed with surgery   -     cholecalciferol, vitamin D3, 50,000 unit capsule; Take 1 capsule (50,000 Units total) by mouth once a week.  -     Comprehensive metabolic panel; Future    Essential hypertension  controlled. Continue current regimen    -     Comprehensive metabolic panel; Future    Palpitations   At this time the frequency, presentation, and lack of red flags most consistent with anxiety. That being said she has risk factors for developing arhythmia. We went over how to monitor one's pulse and concerning associated symptoms. Office and Emergency Department prompts discussed. holter if persist.  -     SCHEDULED EKG 12-LEAD (to Muse); Future    Renal infarct  Comments:  found in setting of enterocolitis    Anxiety  Discussed with patient different options for stress release including but not limited to a regular exercise routine, meditation, hobbies, counseling, avoidance of known triggers if possible, and medications.    -     hydrOXYzine HCl (ATARAX) 25 MG tablet; Take 1 tablet (25 mg total) by mouth 3 (three) times daily.    RTC in 4 months or sooner prn            Kiko Gutierrez MD  Internal Medicine-[facility]        Side effects of medication(s) were discussed in detail and patient voiced understanding.  Patient will call back for any issues or complications.

## 2019-07-08 ENCOUNTER — HOSPITAL ENCOUNTER (OUTPATIENT)
Dept: CARDIOLOGY | Facility: OTHER | Age: 54
Discharge: HOME | End: 2019-07-08
Attending: INTERNAL MEDICINE
Payer: COMMERCIAL

## 2019-07-25 PROBLEM — D56.3: Status: ACTIVE | Noted: 2019-07-25

## 2019-10-21 ENCOUNTER — PATIENT OUTREACH (OUTPATIENT)
Dept: ADMINISTRATIVE | Facility: OTHER | Age: 54
End: 2019-10-21

## 2019-10-23 ENCOUNTER — OFFICE VISIT (OUTPATIENT)
Dept: ENDOCRINOLOGY | Facility: CLINIC | Age: 54
End: 2019-10-23
Payer: COMMERCIAL

## 2019-10-23 ENCOUNTER — PATIENT MESSAGE (OUTPATIENT)
Dept: OBSTETRICS AND GYNECOLOGY | Facility: CLINIC | Age: 54
End: 2019-10-23

## 2019-10-23 ENCOUNTER — TELEPHONE (OUTPATIENT)
Dept: OBSTETRICS AND GYNECOLOGY | Facility: CLINIC | Age: 54
End: 2019-10-23

## 2019-10-23 VITALS
HEART RATE: 86 BPM | WEIGHT: 167.13 LBS | HEIGHT: 65 IN | SYSTOLIC BLOOD PRESSURE: 126 MMHG | DIASTOLIC BLOOD PRESSURE: 80 MMHG | BODY MASS INDEX: 27.85 KG/M2

## 2019-10-23 DIAGNOSIS — Z12.31 ENCOUNTER FOR SCREENING MAMMOGRAM FOR MALIGNANT NEOPLASM OF BREAST: Primary | ICD-10-CM

## 2019-10-23 DIAGNOSIS — E21.0 PRIMARY HYPERPARATHYROIDISM: Primary | ICD-10-CM

## 2019-10-23 DIAGNOSIS — I10 ESSENTIAL HYPERTENSION: ICD-10-CM

## 2019-10-23 DIAGNOSIS — E21.3 HYPERPARATHYROIDISM: ICD-10-CM

## 2019-10-23 DIAGNOSIS — M81.0 OSTEOPOROSIS, UNSPECIFIED OSTEOPOROSIS TYPE, UNSPECIFIED PATHOLOGICAL FRACTURE PRESENCE: ICD-10-CM

## 2019-10-23 DIAGNOSIS — E04.2 MULTINODULAR GOITER: ICD-10-CM

## 2019-10-23 PROCEDURE — 3079F DIAST BP 80-89 MM HG: CPT | Mod: CPTII,S$GLB,, | Performed by: INTERNAL MEDICINE

## 2019-10-23 PROCEDURE — 99214 PR OFFICE/OUTPT VISIT, EST, LEVL IV, 30-39 MIN: ICD-10-PCS | Mod: S$GLB,,, | Performed by: INTERNAL MEDICINE

## 2019-10-23 PROCEDURE — 99999 PR PBB SHADOW E&M-EST. PATIENT-LVL IV: ICD-10-PCS | Mod: PBBFAC,,, | Performed by: INTERNAL MEDICINE

## 2019-10-23 PROCEDURE — 3074F PR MOST RECENT SYSTOLIC BLOOD PRESSURE < 130 MM HG: ICD-10-PCS | Mod: CPTII,S$GLB,, | Performed by: INTERNAL MEDICINE

## 2019-10-23 PROCEDURE — 3079F PR MOST RECENT DIASTOLIC BLOOD PRESSURE 80-89 MM HG: ICD-10-PCS | Mod: CPTII,S$GLB,, | Performed by: INTERNAL MEDICINE

## 2019-10-23 PROCEDURE — 99999 PR PBB SHADOW E&M-EST. PATIENT-LVL IV: CPT | Mod: PBBFAC,,, | Performed by: INTERNAL MEDICINE

## 2019-10-23 PROCEDURE — 99214 OFFICE O/P EST MOD 30 MIN: CPT | Mod: S$GLB,,, | Performed by: INTERNAL MEDICINE

## 2019-10-23 PROCEDURE — 3074F SYST BP LT 130 MM HG: CPT | Mod: CPTII,S$GLB,, | Performed by: INTERNAL MEDICINE

## 2019-10-23 NOTE — ASSESSMENT & PLAN NOTE
Check 4D CT     Reviewed surgical indications   Her indication is osteoporosis   Avoid hctz      Refer to Dr Wilson

## 2019-10-23 NOTE — ASSESSMENT & PLAN NOTE
Follow      Discussed indications for a FNA      Discussed  surgical indications (abnormal FNA, compressive symptoms or interval change)

## 2019-10-24 ENCOUNTER — HOSPITAL ENCOUNTER (OUTPATIENT)
Dept: RADIOLOGY | Facility: HOSPITAL | Age: 54
Discharge: HOME | End: 2019-10-24
Attending: INTERNAL MEDICINE
Payer: COMMERCIAL

## 2019-10-24 DIAGNOSIS — E21.3 HYPERPARATHYROIDISM: Primary | ICD-10-CM

## 2019-10-24 LAB
CREAT SERPL-MCNC: 0.9 MG/DL (ref 0.5–1.4)
SAMPLE: NORMAL

## 2019-10-24 PROCEDURE — 70492 CT SFT TSUE NCK W/O & W/DYE: CPT | Mod: 26,,, | Performed by: RADIOLOGY

## 2019-10-24 PROCEDURE — 70492 CT NECK PARATHYROID (4D): ICD-10-PCS | Mod: 26,,, | Performed by: RADIOLOGY

## 2019-10-24 PROCEDURE — 25500020 PHARM REV CODE 255: Performed by: INTERNAL MEDICINE

## 2019-10-24 PROCEDURE — 70492 CT SFT TSUE NCK W/O & W/DYE: CPT | Mod: TC

## 2019-10-24 RX ADMIN — IOHEXOL 75 ML: 350 INJECTION, SOLUTION INTRAVENOUS at 07:10

## 2019-10-25 ENCOUNTER — TELEPHONE (OUTPATIENT)
Dept: ENDOCRINOLOGY | Facility: CLINIC | Age: 54
End: 2019-10-25

## 2019-10-25 NOTE — TELEPHONE ENCOUNTER
----- Message from Deidre Guzmán MA sent at 10/24/2019  4:01 PM CDT -----  Contact: Cinda / Fabiola [facility] [phone]      ----- Message -----  From: Lu Win  Sent: 10/24/2019   3:52 PM CDT  To: Sesar MATTSON Staff    PT is at the [facility]. They are not able to perform the CT IV and wants to know if that is really what Dr. Kaba wants. If so, the pt will have to schedule at [facility] [place].

## 2019-10-25 NOTE — TELEPHONE ENCOUNTER
Lori said that they asked her to put in parotid ultrasound orders.  Can you view the images and report and see if this imaging will suffice for Dr Wilson.  If not, may need to place new orders for 4D CT scan and repeat here at [facility] in the imaging center.

## 2019-10-28 DIAGNOSIS — I10 HYPERTENSION, UNSPECIFIED TYPE: ICD-10-CM

## 2019-10-28 RX ORDER — AMLODIPINE BESYLATE 10 MG/1
10 TABLET ORAL DAILY
Qty: 90 TABLET | Refills: 3 | Status: SHIPPED | OUTPATIENT
Start: 2019-10-28 | End: 2019-10-29 | Stop reason: SDUPTHER

## 2019-10-28 RX ORDER — FUROSEMIDE 20 MG/1
20 TABLET ORAL DAILY
Qty: 90 TABLET | Refills: 3 | Status: SHIPPED | OUTPATIENT
Start: 2019-10-28 | End: 2019-10-29 | Stop reason: SDUPTHER

## 2019-10-29 DIAGNOSIS — I10 HYPERTENSION, UNSPECIFIED TYPE: ICD-10-CM

## 2019-10-29 DIAGNOSIS — I10 ESSENTIAL HYPERTENSION: ICD-10-CM

## 2019-10-29 RX ORDER — AMLODIPINE BESYLATE 10 MG/1
10 TABLET ORAL DAILY
Qty: 90 TABLET | Refills: 3 | Status: SHIPPED | OUTPATIENT
Start: 2019-10-29 | End: 2020-11-24 | Stop reason: SDUPTHER

## 2019-10-29 RX ORDER — FUROSEMIDE 20 MG/1
20 TABLET ORAL DAILY
Qty: 90 TABLET | Refills: 3 | Status: SHIPPED | OUTPATIENT
Start: 2019-10-29 | End: 2020-11-24 | Stop reason: SDUPTHER

## 2019-10-29 RX ORDER — METOPROLOL TARTRATE 25 MG/1
12.5 TABLET, FILM COATED ORAL 2 TIMES DAILY
Qty: 90 TABLET | Refills: 2 | Status: SHIPPED | OUTPATIENT
Start: 2019-10-29 | End: 2020-07-03 | Stop reason: SDUPTHER

## 2019-10-29 RX ORDER — ASPIRIN 325 MG
50000 TABLET, DELAYED RELEASE (ENTERIC COATED) ORAL WEEKLY
Qty: 12 CAPSULE | Refills: 3 | Status: SHIPPED | OUTPATIENT
Start: 2019-10-29 | End: 2020-08-25

## 2019-11-07 ENCOUNTER — HOSPITAL ENCOUNTER (OUTPATIENT)
Dept: RADIOLOGY | Facility: OTHER | Age: 54
Discharge: HOME | End: 2019-11-07
Attending: OBSTETRICS & GYNECOLOGY
Payer: COMMERCIAL

## 2019-11-07 DIAGNOSIS — Z12.31 ENCOUNTER FOR SCREENING MAMMOGRAM FOR MALIGNANT NEOPLASM OF BREAST: ICD-10-CM

## 2019-11-07 PROCEDURE — 77063 BREAST TOMOSYNTHESIS BI: CPT | Mod: 26,,, | Performed by: RADIOLOGY

## 2019-11-07 PROCEDURE — 77063 MAMMO DIGITAL SCREENING BILAT WITH TOMOSYNTHESIS_CAD: ICD-10-PCS | Mod: 26,,, | Performed by: RADIOLOGY

## 2019-11-07 PROCEDURE — 77067 MAMMO DIGITAL SCREENING BILAT WITH TOMOSYNTHESIS_CAD: ICD-10-PCS | Mod: 26,,, | Performed by: RADIOLOGY

## 2019-11-07 PROCEDURE — 77067 SCR MAMMO BI INCL CAD: CPT | Mod: 26,,, | Performed by: RADIOLOGY

## 2019-11-07 PROCEDURE — 77067 SCR MAMMO BI INCL CAD: CPT | Mod: TC

## 2019-11-20 ENCOUNTER — PATIENT MESSAGE (OUTPATIENT)
Dept: ENDOCRINOLOGY | Facility: CLINIC | Age: 54
End: 2019-11-20

## 2019-11-20 ENCOUNTER — PATIENT OUTREACH (OUTPATIENT)
Dept: ADMINISTRATIVE | Facility: OTHER | Age: 54
End: 2019-11-20

## 2019-11-21 ENCOUNTER — OFFICE VISIT (OUTPATIENT)
Dept: SURGERY | Facility: CLINIC | Age: 54
End: 2019-11-21
Payer: COMMERCIAL

## 2019-11-21 ENCOUNTER — HOSPITAL ENCOUNTER (OUTPATIENT)
Dept: RADIOLOGY | Facility: HOSPITAL | Age: 54
Discharge: HOME | End: 2019-11-21
Attending: SURGERY
Payer: COMMERCIAL

## 2019-11-21 VITALS
SYSTOLIC BLOOD PRESSURE: 140 MMHG | DIASTOLIC BLOOD PRESSURE: 67 MMHG | HEIGHT: 65 IN | RESPIRATION RATE: 17 BRPM | OXYGEN SATURATION: 98 % | BODY MASS INDEX: 28.36 KG/M2 | TEMPERATURE: 96 F | WEIGHT: 170.19 LBS | HEART RATE: 81 BPM

## 2019-11-21 DIAGNOSIS — E21.0 PRIMARY HYPERPARATHYROIDISM: ICD-10-CM

## 2019-11-21 DIAGNOSIS — Z01.818 PRE-OP TESTING: ICD-10-CM

## 2019-11-21 DIAGNOSIS — Z01.818 PRE-OP TESTING: Primary | ICD-10-CM

## 2019-11-21 PROCEDURE — 99204 PR OFFICE/OUTPT VISIT, NEW, LEVL IV, 45-59 MIN: ICD-10-PCS | Mod: S$GLB,,, | Performed by: SURGERY

## 2019-11-21 PROCEDURE — 99999 PR PBB SHADOW E&M-EST. PATIENT-LVL III: CPT | Mod: PBBFAC,,, | Performed by: SURGERY

## 2019-11-21 PROCEDURE — 99204 OFFICE O/P NEW MOD 45 MIN: CPT | Mod: S$GLB,,, | Performed by: SURGERY

## 2019-11-21 PROCEDURE — 99999 PR PBB SHADOW E&M-EST. PATIENT-LVL III: ICD-10-PCS | Mod: PBBFAC,,, | Performed by: SURGERY

## 2019-11-21 PROCEDURE — 71046 X-RAY EXAM CHEST 2 VIEWS: CPT | Mod: 26,,, | Performed by: SPECIALIST

## 2019-11-21 PROCEDURE — 71046 X-RAY EXAM CHEST 2 VIEWS: CPT | Mod: TC,FY

## 2019-11-21 PROCEDURE — 71046 XR CHEST PA AND LATERAL: ICD-10-PCS | Mod: 26,,, | Performed by: SPECIALIST

## 2019-11-21 NOTE — H&P (VIEW-ONLY)
History and Physical  Endocrine Surgery    Visit DiagnosisNo primary diagnosis found.    SUBJECTIVE:     Patient is a 54 y.o. female who was referred by Dr. Valerie Kaba and is here with her   and presents for evaluation of primary hyperparathyroidism. The patient has had complaints of elevation of the serum calcium and parathormone. There is no history of lithium or thiazide medication use. She has not had previous history of head or neck radiation. She admits joint pain. She denies sleep disturbance, mood changes, abdominal pain, weakness, increased urination and increased thirst. Furthermore, there is no history of pathologic fractures, malignancy, or personal history of thyroid, adrenal, or pancreatic abnormalities. The patient is vitamin D replete. She consumes is on Vitamin D supplements. she is not on calcium supplementationShe does not have familial history of endocrinopathies.      Review of patient's allergies indicates:   Allergen Reactions    Sulfa (sulfonamide antibiotics) Hives and Edema       Current Outpatient Medications   Medication Sig Dispense Refill    acetaminophen (TYLENOL) 500 MG tablet Take 2 tablets (1,000 mg total) by mouth every 6 (six) hours as needed.  0    amLODIPine (NORVASC) 10 MG tablet Take 1 tablet (10 mg total) by mouth once daily. 90 tablet 3    cetirizine (ZYRTEC) 10 MG tablet Take 1 tablet (10 mg total) by mouth daily as needed for Allergies. (Patient not taking: Reported on 10/23/2019)  0    cholecalciferol, vitamin D3, 50,000 unit capsule Take 1 capsule (50,000 Units total) by mouth once a week. 12 capsule 3    furosemide (LASIX) 20 MG tablet Take 1 tablet (20 mg total) by mouth once daily. 90 tablet 3    hydrOXYzine HCl (ATARAX) 25 MG tablet Take 1 tablet (25 mg total) by mouth 3 (three) times daily. (Patient taking differently: Take 25 mg by mouth 3 (three) times daily as needed. ) 45 tablet 1    losartan (COZAAR) 100 MG tablet Take 1 tablet (100 mg  total) by mouth every evening. 90 tablet 3    metoprolol tartrate (LOPRESSOR) 25 MG tablet Take 0.5 tablets (12.5 mg total) by mouth 2 (two) times daily. 90 tablet 2    spironolactone (ALDACTONE) 25 MG tablet Take 1 tablet (25 mg total) by mouth once daily. 90 tablet 2     No current facility-administered medications for this visit.        Past Medical History:   Diagnosis Date    Abnormal Pap smear     repeat normal    Abnormal Pap smear of cervix     Abnormal Pap smear of vagina     Allergy     seasonal    AR (allergic rhinitis)     Hypercalcemia 9/10/2015    Hypertension     IGT (impaired glucose tolerance) 9/9/2014    Pneumonia 12/2016     Past Surgical History:   Procedure Laterality Date    ABDOMINAL SURGERY      CHOLECYSTECTOMY      1993    COLONOSCOPY N/A 9/24/2016    Procedure: COLONOSCOPY;  Surgeon: Johnny Clayton MD;  Location: [address]);  Service: Endoscopy;  Laterality: N/A;    COLONOSCOPY N/A 12/27/2017    Procedure: COLONOSCOPY;  Surgeon: Johnny Clayton MD;  Location: [address]);  Service: Endoscopy;  Laterality: N/A;    COLPOSCOPY      DILATION AND CURETTAGE OF UTERUS      HYSTERECTOMY      2007 tahbso     OOPHORECTOMY       Social History     Tobacco Use    Smoking status: Never Smoker    Smokeless tobacco: Never Used   Substance Use Topics    Alcohol use: Yes     Alcohol/week: 0.0 standard drinks     Types: 1 Standard drinks or equivalent per week     Frequency: Monthly or less     Drinks per session: 1 or 2     Binge frequency: Never     Comment: socailly    Drug use: No          Review of Systems:    Review of Systems   Constitutional: Negative.    HEENT: Negative.    Cardiovascular: Negative.    Endocrine: endocrine negative   Musculoskeletal: Negative.    Skin: Negative.    Gastrointestinal: Negative.    Hematological: Negative.          OBJECTIVE:     Vital Signs:  BP (!) 140/67 (BP Location: Left arm, Patient Position: Sitting)   Pulse 81   Temp 96.1 °F  "(35.6 °C) (Oral)   Resp 17   Ht 5' 5" (1.651 m)   Wt 77.2 kg (170 lb 3.1 oz)   SpO2 98%   BMI 28.32 kg/m²    Body mass index is 28.32 kg/m².      Physical Exam    General:  no distress, see vitals for BMI    Eyes:  conjunctivae/corneas clear   Neck: trachea midline and symmetric, no adenopathy    Thyroid:  thyroid not enlarged   Lung: clear to auscultation bilaterally   Heart:  regular rate and rhythm   Abdomen: soft, non-tender; bowel sounds normal; no masses,  no organomegaly   Skin/Extremities: warm and well-perfused   Pulses: 2+ and symmetric   Neuro: normal without focal findings and mental status, speech normal, alert and oriented x3       Imaging    Studies:  Date:       Results:     DEXA:   04/25/19 Spine T Score: -2.1, Hip T Score: -1.5,   Femoral neck T Score: -1.2, Distal radius (Forearm) T Score: -2.8   Ultrasound:  04/25/19 RIGHT LOBE:    Right lobe of the thyroid measures 5.29 x 1.35 x 1.52 cm.    0.68 x 0.49 x 0.63 cm solid, heterogeneous predominately hypoechoic nodule is seen in the right inferior pole.  Margins are well-defined.  A few areas of microcystic change are seen.  No microcalcifications are seen.  Vascularity is grade 1.    0.93 x 0.48 x 0.66 cm hypoechoic lesion is seen posterior the mid lobe of the thyroid.  Polar artery flow can be demonstrated.  An echogenic line can be seen where the lesion appears to compress the posterior capsule of the thyroid.      LEFT LOBE:    Left lobe of the thyroid measures 4.99 x 1.31 x 1.84 cm.    1.50 x 0.59 x 1.07 cm colloid-containing cyst is seen in the left superior pole.    1.32 x 0.78 x 1.09 cm solid, heterogeneous predominately hypoechoic nodule is seen in the left inferior pole.  Margins are irregular.  No microcalcifications are seen.  Vascularity is grade 1.   4d CT scan:  10/24/19 1. Small focus of enhancement posterior to the right thyroid lobe demonstrating washout consistent with a parathyroid adenoma, corresponding to findings on " ultrasound 04/25/2019.  2. Multiple hypodense nodules throughout the thyroid, similar to prior ultrasound.  3. Incidental osseous lesions at C2 and T3, likely benign.     Lab Review    24-Hour Urine Collection:  Date: 3/25/19    Urine Calcium:    23 mg/dL         Component Value Date    Vit D, 25-Hydroxy 13 (L) 08/15/2018    PTH, Intact 146.0 (H) 03/07/2019    PTH, Intact 75 09/09/2014    Calcium 11.0 (H) 07/05/2019    Phosphorus 2.9 04/10/2019    TSH 0.906 07/05/2019    Free T4 1.04 12/02/2016           ASSESSMENT/PLAN:       Assessment     Pastora Cota is an 54 y.o. female who presents withprimary hyperparathyroidism, likely due to a parathyroid adenoma. The above testing and examination support the diagnosis. Patient meets criteria for recommending parathyroid surgery. This is based on her history of reduced cortical bone density. She currently has possible localization to the right via imaging (Parathyroid protocol CT Scan).       Plan  - Patient is in agreement with parathyroidectomy. Likely has a single adenoma in the right inferior position based on available imaging. Scheduled and consented for right parathyroidectomy, possible bilateral on 12/11/19    Félix Perez MD  General Surgery PGYIII  [phone]  I have personally taken the history and examined this patient and agree with the resident's note as stated above.  The patient is referred by Dr. Kaba.  The patient has primary hyperparathyroidism.  The patient has osteoporosis.  She has normal urinary function with a normal GFR and creatinine level.  She has a history of thyroid nodules less than 1.5 cm in size status post benign fine-needle aspiration biopsy in the past.  Recent calcium level has been approximately 11 in July of 2019 with intact parathyroid hormone levels in the 150s.  She had a CT scan of the neck which revealed a finding consistent with a 1 cm right inferior pole parathyroid adenoma.  Her urinary calcium level is elevated at 23  mg/dL.    A/P:  Primary hyperparathyroidism with apparent right-sided parathyroid adenoma by CT scan imaging.  We will defer sestamibi scan imaging since the patient has a positive finding on the CT parathyroid scan of the neck.  She has been consented and scheduled for a right parathyroidectomy with intraoperative tacked parathyroid hormone levels, possible subtotal parathyroidectomy on Wednesday 12/11/2019.

## 2019-11-21 NOTE — LETTER
November 22, 2019      Valerie Kaba MD  [address]  [place] [address]           [facility] [place]  [address]  [place] [address]  Phone: [phone]          Patient: Pastora Cota   MR Number: [id number]   YOB: 1965   Date of Visit: 11/21/2019       Dear Dr. Valerie Kaba:    Thank you for referring Pastora Cota to me for evaluation. Attached you will find relevant portions of my assessment and plan of care.    If you have questions, please do not hesitate to call me. I look forward to following Pastora Cota along with you.    Sincerely,    Kiko Wilson MD    Enclosure  CC:  No Recipients    If you would like to receive this communication electronically, please contact [email or web address] or [phone] to request more information on [employer] Link access.    For providers and/or their staff who would like to refer a patient to [facility], please contact us through our one-stop-shop provider referral line, [facility], at [phone].    If you feel you have received this communication in error or would no longer like to receive these types of communications, please e-mail [email or web address]

## 2019-11-21 NOTE — PROGRESS NOTES
History and Physical  Endocrine Surgery    Visit DiagnosisNo primary diagnosis found.    SUBJECTIVE:     Patient is a 54 y.o. female who was referred by Dr. Valerie Kaba and is here with her   and presents for evaluation of primary hyperparathyroidism. The patient has had complaints of elevation of the serum calcium and parathormone. There is no history of lithium or thiazide medication use. She has not had previous history of head or neck radiation. She admits joint pain. She denies sleep disturbance, mood changes, abdominal pain, weakness, increased urination and increased thirst. Furthermore, there is no history of pathologic fractures, malignancy, or personal history of thyroid, adrenal, or pancreatic abnormalities. The patient is vitamin D replete. She consumes is on Vitamin D supplements. she is not on calcium supplementationShe does not have familial history of endocrinopathies.      Review of patient's allergies indicates:   Allergen Reactions    Sulfa (sulfonamide antibiotics) Hives and Edema       Current Outpatient Medications   Medication Sig Dispense Refill    acetaminophen (TYLENOL) 500 MG tablet Take 2 tablets (1,000 mg total) by mouth every 6 (six) hours as needed.  0    amLODIPine (NORVASC) 10 MG tablet Take 1 tablet (10 mg total) by mouth once daily. 90 tablet 3    cetirizine (ZYRTEC) 10 MG tablet Take 1 tablet (10 mg total) by mouth daily as needed for Allergies. (Patient not taking: Reported on 10/23/2019)  0    cholecalciferol, vitamin D3, 50,000 unit capsule Take 1 capsule (50,000 Units total) by mouth once a week. 12 capsule 3    furosemide (LASIX) 20 MG tablet Take 1 tablet (20 mg total) by mouth once daily. 90 tablet 3    hydrOXYzine HCl (ATARAX) 25 MG tablet Take 1 tablet (25 mg total) by mouth 3 (three) times daily. (Patient taking differently: Take 25 mg by mouth 3 (three) times daily as needed. ) 45 tablet 1    losartan (COZAAR) 100 MG tablet Take 1 tablet (100 mg  total) by mouth every evening. 90 tablet 3    metoprolol tartrate (LOPRESSOR) 25 MG tablet Take 0.5 tablets (12.5 mg total) by mouth 2 (two) times daily. 90 tablet 2    spironolactone (ALDACTONE) 25 MG tablet Take 1 tablet (25 mg total) by mouth once daily. 90 tablet 2     No current facility-administered medications for this visit.        Past Medical History:   Diagnosis Date    Abnormal Pap smear     repeat normal    Abnormal Pap smear of cervix     Abnormal Pap smear of vagina     Allergy     seasonal    AR (allergic rhinitis)     Hypercalcemia 9/10/2015    Hypertension     IGT (impaired glucose tolerance) 9/9/2014    Pneumonia 12/2016     Past Surgical History:   Procedure Laterality Date    ABDOMINAL SURGERY      CHOLECYSTECTOMY      1993    COLONOSCOPY N/A 9/24/2016    Procedure: COLONOSCOPY;  Surgeon: Johnny Clayton MD;  Location: [address]);  Service: Endoscopy;  Laterality: N/A;    COLONOSCOPY N/A 12/27/2017    Procedure: COLONOSCOPY;  Surgeon: Johnny Clayton MD;  Location: [address]);  Service: Endoscopy;  Laterality: N/A;    COLPOSCOPY      DILATION AND CURETTAGE OF UTERUS      HYSTERECTOMY      2007 tahbso     OOPHORECTOMY       Social History     Tobacco Use    Smoking status: Never Smoker    Smokeless tobacco: Never Used   Substance Use Topics    Alcohol use: Yes     Alcohol/week: 0.0 standard drinks     Types: 1 Standard drinks or equivalent per week     Frequency: Monthly or less     Drinks per session: 1 or 2     Binge frequency: Never     Comment: socailly    Drug use: No          Review of Systems:    Review of Systems   Constitutional: Negative.    HEENT: Negative.    Cardiovascular: Negative.    Endocrine: endocrine negative   Musculoskeletal: Negative.    Skin: Negative.    Gastrointestinal: Negative.    Hematological: Negative.          OBJECTIVE:     Vital Signs:  BP (!) 140/67 (BP Location: Left arm, Patient Position: Sitting)   Pulse 81   Temp 96.1 °F  "(35.6 °C) (Oral)   Resp 17   Ht 5' 5" (1.651 m)   Wt 77.2 kg (170 lb 3.1 oz)   SpO2 98%   BMI 28.32 kg/m²    Body mass index is 28.32 kg/m².      Physical Exam    General:  no distress, see vitals for BMI    Eyes:  conjunctivae/corneas clear   Neck: trachea midline and symmetric, no adenopathy    Thyroid:  thyroid not enlarged   Lung: clear to auscultation bilaterally   Heart:  regular rate and rhythm   Abdomen: soft, non-tender; bowel sounds normal; no masses,  no organomegaly   Skin/Extremities: warm and well-perfused   Pulses: 2+ and symmetric   Neuro: normal without focal findings and mental status, speech normal, alert and oriented x3       Imaging    Studies:  Date:       Results:     DEXA:   04/25/19 Spine T Score: -2.1, Hip T Score: -1.5,   Femoral neck T Score: -1.2, Distal radius (Forearm) T Score: -2.8   Ultrasound:  04/25/19 RIGHT LOBE:    Right lobe of the thyroid measures 5.29 x 1.35 x 1.52 cm.    0.68 x 0.49 x 0.63 cm solid, heterogeneous predominately hypoechoic nodule is seen in the right inferior pole.  Margins are well-defined.  A few areas of microcystic change are seen.  No microcalcifications are seen.  Vascularity is grade 1.    0.93 x 0.48 x 0.66 cm hypoechoic lesion is seen posterior the mid lobe of the thyroid.  Polar artery flow can be demonstrated.  An echogenic line can be seen where the lesion appears to compress the posterior capsule of the thyroid.      LEFT LOBE:    Left lobe of the thyroid measures 4.99 x 1.31 x 1.84 cm.    1.50 x 0.59 x 1.07 cm colloid-containing cyst is seen in the left superior pole.    1.32 x 0.78 x 1.09 cm solid, heterogeneous predominately hypoechoic nodule is seen in the left inferior pole.  Margins are irregular.  No microcalcifications are seen.  Vascularity is grade 1.   4d CT scan:  10/24/19 1. Small focus of enhancement posterior to the right thyroid lobe demonstrating washout consistent with a parathyroid adenoma, corresponding to findings on " ultrasound 04/25/2019.  2. Multiple hypodense nodules throughout the thyroid, similar to prior ultrasound.  3. Incidental osseous lesions at C2 and T3, likely benign.     Lab Review    24-Hour Urine Collection:  Date: 3/25/19    Urine Calcium:    23 mg/dL         Component Value Date    Vit D, 25-Hydroxy 13 (L) 08/15/2018    PTH, Intact 146.0 (H) 03/07/2019    PTH, Intact 75 09/09/2014    Calcium 11.0 (H) 07/05/2019    Phosphorus 2.9 04/10/2019    TSH 0.906 07/05/2019    Free T4 1.04 12/02/2016           ASSESSMENT/PLAN:       Assessment     Pastora Cota is an 54 y.o. female who presents withprimary hyperparathyroidism, likely due to a parathyroid adenoma. The above testing and examination support the diagnosis. Patient meets criteria for recommending parathyroid surgery. This is based on her history of reduced cortical bone density. She currently has possible localization to the right via imaging (Parathyroid protocol CT Scan).       Plan  - Patient is in agreement with parathyroidectomy. Likely has a single adenoma in the right inferior position based on available imaging. Scheduled and consented for right parathyroidectomy, possible bilateral on 12/11/19    Félix Perez MD  General Surgery PGYIII  [phone]  I have personally taken the history and examined this patient and agree with the resident's note as stated above.  The patient is referred by Dr. Kaba.  The patient has primary hyperparathyroidism.  The patient has osteoporosis.  She has normal urinary function with a normal GFR and creatinine level.  She has a history of thyroid nodules less than 1.5 cm in size status post benign fine-needle aspiration biopsy in the past.  Recent calcium level has been approximately 11 in July of 2019 with intact parathyroid hormone levels in the 150s.  She had a CT scan of the neck which revealed a finding consistent with a 1 cm right inferior pole parathyroid adenoma.  Her urinary calcium level is elevated at 23  mg/dL.    A/P:  Primary hyperparathyroidism with apparent right-sided parathyroid adenoma by CT scan imaging.  We will defer sestamibi scan imaging since the patient has a positive finding on the CT parathyroid scan of the neck.  She has been consented and scheduled for a right parathyroidectomy with intraoperative tacked parathyroid hormone levels, possible subtotal parathyroidectomy on Wednesday 12/11/2019.

## 2019-11-22 ENCOUNTER — PATIENT MESSAGE (OUTPATIENT)
Dept: ENDOCRINOLOGY | Facility: CLINIC | Age: 54
End: 2019-11-22

## 2019-11-22 DIAGNOSIS — E21.0 PRIMARY HYPERPARATHYROIDISM: Primary | ICD-10-CM

## 2019-11-25 ENCOUNTER — OFFICE VISIT (OUTPATIENT)
Dept: ENDOCRINOLOGY | Facility: CLINIC | Age: 54
End: 2019-11-25
Payer: COMMERCIAL

## 2019-11-25 VITALS
BODY MASS INDEX: 28.17 KG/M2 | DIASTOLIC BLOOD PRESSURE: 80 MMHG | HEART RATE: 80 BPM | HEIGHT: 65 IN | WEIGHT: 169.06 LBS | SYSTOLIC BLOOD PRESSURE: 138 MMHG

## 2019-11-25 DIAGNOSIS — I10 ESSENTIAL HYPERTENSION: ICD-10-CM

## 2019-11-25 DIAGNOSIS — E21.0 PRIMARY HYPERPARATHYROIDISM: Primary | ICD-10-CM

## 2019-11-25 DIAGNOSIS — E04.2 MULTINODULAR GOITER: ICD-10-CM

## 2019-11-25 DIAGNOSIS — M81.0 OSTEOPOROSIS, UNSPECIFIED OSTEOPOROSIS TYPE, UNSPECIFIED PATHOLOGICAL FRACTURE PRESENCE: ICD-10-CM

## 2019-11-25 PROCEDURE — 3075F SYST BP GE 130 - 139MM HG: CPT | Mod: CPTII,S$GLB,, | Performed by: INTERNAL MEDICINE

## 2019-11-25 PROCEDURE — 3075F PR MOST RECENT SYSTOLIC BLOOD PRESS GE 130-139MM HG: ICD-10-PCS | Mod: CPTII,S$GLB,, | Performed by: INTERNAL MEDICINE

## 2019-11-25 PROCEDURE — 99999 PR PBB SHADOW E&M-EST. PATIENT-LVL III: ICD-10-PCS | Mod: PBBFAC,,, | Performed by: INTERNAL MEDICINE

## 2019-11-25 PROCEDURE — 3079F PR MOST RECENT DIASTOLIC BLOOD PRESSURE 80-89 MM HG: ICD-10-PCS | Mod: CPTII,S$GLB,, | Performed by: INTERNAL MEDICINE

## 2019-11-25 PROCEDURE — 3008F PR BODY MASS INDEX (BMI) DOCUMENTED: ICD-10-PCS | Mod: CPTII,S$GLB,, | Performed by: INTERNAL MEDICINE

## 2019-11-25 PROCEDURE — 3079F DIAST BP 80-89 MM HG: CPT | Mod: CPTII,S$GLB,, | Performed by: INTERNAL MEDICINE

## 2019-11-25 PROCEDURE — 99214 OFFICE O/P EST MOD 30 MIN: CPT | Mod: S$GLB,,, | Performed by: INTERNAL MEDICINE

## 2019-11-25 PROCEDURE — 99214 PR OFFICE/OUTPT VISIT, EST, LEVL IV, 30-39 MIN: ICD-10-PCS | Mod: S$GLB,,, | Performed by: INTERNAL MEDICINE

## 2019-11-25 PROCEDURE — 3008F BODY MASS INDEX DOCD: CPT | Mod: CPTII,S$GLB,, | Performed by: INTERNAL MEDICINE

## 2019-11-25 PROCEDURE — 99999 PR PBB SHADOW E&M-EST. PATIENT-LVL III: CPT | Mod: PBBFAC,,, | Performed by: INTERNAL MEDICINE

## 2019-11-25 NOTE — PROGRESS NOTES
Subjective:      Patient ID: Pastora Cota is a 54 y.o. female.    Chief Complaint:  Hyperparathyroidism      History of Present Illness  With regards to Primary hyperparathyroidism  Saw Dr Wilson and is scheduled for surgery 12/11/2019.      She has episodic hypercalcemia     She takes 1200 mg calcium daily plus 3-4 dairy servings.  She is  off HCTZ.  Denies constipation, bone pain, weakness.       With regards to the Osteoposoris   newly dx on bmd 4/25/19  Impression       Osteoporosis of the distal 1/3 forearm      noted that there also has been l spine ahd hip vone loss when compared to the last study     No recent falls or fractures     With regards to the MNG    In 2011 she presented with palpitations   She had a ct of the chest  The goiter was noted at that time  She had an u/s done and was advised to f/u     Last thyroid ultrasound 4/25/19    COMPARISON:  Neck ultrasound dated 9/2/2015.  When compared to the prior study an additional nodule is now noted in the left inferior pole.      Impression       1.)  Thyroid gland is normal in size with homogeneous echotexture and normal vascularity    2.) 0.68 x 0.49 x 0.63 cm solid, heterogeneous predominately hypoechoic nodule seen in the right inferior pole    3.) 0.93 x 0.48 x 0.66 cm hypoechoic lesion seen posterior the mid lobe of the thyroid which could represent a parathyroid adenoma    4.) 1.50 x 0.59 x 1.07 cm colloid-containing cyst seen in the left superior pole    5.) 1.32 x 0.78 x 1.09 cm solid, heterogeneous predominately hypoechoic nodule seen in the left inferior pole    RECOMMENDATIONS:  Recommend FNA of left inferior pole nodule.      fna done 6/5/19  THYROID, LEFT INFERIOR NODULE, FINE-NEEDLE ASPIRATION:  Satisfactory for interpretation  [place] [facility] thyroid cytology category: Benign  Benign follicular epithelial cells, abundant macrophages and watery colloid consistent with benign colloid nodule  with cystic degeneration    she does  not notice the goiter  No voice changes, no difficulty swallowing or breathing  no FH of thyroid disease or disease   no h/o radiation treatment or exposure  Did have light therapy for acne           With regards to   Body mass index is 28.14 kg/m².,  igt    Denies symptoms of hyperglycemia such as polyuria, polydipsia, nocturia, unexplained weight loss or blurred vision           Review of Systems   Constitutional: Negative for unexpected weight change.   Eyes: Negative for visual disturbance.   Respiratory: Negative for shortness of breath.    Cardiovascular: Negative for chest pain.   Gastrointestinal: Negative for abdominal pain.   Musculoskeletal: Negative for arthralgias.   Skin: Negative for wound.   Neurological: Negative for headaches.   Hematological: Does not bruise/bleed easily.   Psychiatric/Behavioral: Negative for sleep disturbance.       Objective:   Physical Exam   Neck: Thyromegaly present.   Cardiovascular: Normal rate.   Pulmonary/Chest: Effort normal.   Abdominal: Soft.   Musculoskeletal: She exhibits no edema.   Lymphadenopathy:     She has no cervical adenopathy.   Vitals reviewed.      Body mass index is 28.14 kg/m².    Lab Review:   Lab Results   Component Value Date    HGBA1C 5.5 04/07/2019     Lab Results   Component Value Date    CHOL 130 04/07/2019    HDL 38 (L) 04/07/2019    LDLCALC 85.6 04/07/2019    TRIG 32 04/07/2019    CHOLHDL 29.2 04/07/2019     Lab Results   Component Value Date     11/21/2019    K 4.1 11/21/2019     11/21/2019    CO2 26 11/21/2019    GLU 85 11/21/2019    BUN 19 11/21/2019    CREATININE 0.9 11/21/2019    CALCIUM 10.8 (H) 11/21/2019    PROT 8.1 11/21/2019    ALBUMIN 3.8 11/21/2019    BILITOT 0.2 11/21/2019    ALKPHOS 104 11/21/2019    AST 16 11/21/2019    ALT 18 11/21/2019    ANIONGAP 7 (L) 11/21/2019    ESTGFRAFRICA >60.0 11/21/2019    EGFRNONAA >60.0 11/21/2019    TSH 0.906 07/05/2019       Assessment and Plan     Primary hyperparathyroidism  Surgery  planned  Anticipate resoltion of hypercalcinemia after and improvement of bmd       Osteoporosis  Address above   If surgery is not opted for consider bisphosphonate therapy     Essential hypertension  Follow at home       Multinodular goiter  Follow      Discussed indications for a FNA      Discussed  surgical indications (abnormal FNA, compressive symptoms or interval change)

## 2019-12-04 ENCOUNTER — PATIENT MESSAGE (OUTPATIENT)
Dept: INTERNAL MEDICINE | Facility: CLINIC | Age: 54
End: 2019-12-04

## 2019-12-04 ENCOUNTER — PATIENT MESSAGE (OUTPATIENT)
Dept: SURGERY | Facility: HOSPITAL | Age: 54
End: 2019-12-04

## 2019-12-04 ENCOUNTER — TELEPHONE (OUTPATIENT)
Dept: SURGERY | Facility: CLINIC | Age: 54
End: 2019-12-04

## 2019-12-04 NOTE — TELEPHONE ENCOUNTER
Patient sent message through portal concerned about her chest x-ray since it suggested possible atelectasis of left lung. Informed patient a result of this nature is usually due from not taking a deep enough breath in when x-ray is performed. Patient denies any shortness of breath or respiratory issues at this time. Patient concerned because she had pneumonia few years ago. Informed patient that Dr. Wilson gets the results of all orders, if he sees any concern, he will let her know. Patient encouraged to follow up with PCP if any symptoms do occur. Patient feels more at ease and verbalizes understanding.

## 2019-12-10 ENCOUNTER — ANESTHESIA EVENT (OUTPATIENT)
Dept: SURGERY | Facility: HOSPITAL | Age: 54
End: 2019-12-10
Payer: COMMERCIAL

## 2019-12-10 ENCOUNTER — TELEPHONE (OUTPATIENT)
Dept: SURGERY | Facility: CLINIC | Age: 54
End: 2019-12-10

## 2019-12-10 NOTE — PRE-PROCEDURE INSTRUCTIONS
Preop instructions:     NPO instructions: NO solids foods,non-clear liquids including milk, milk products, creamers, gum, mints, or hard candy after midnight the night prior to your surgery.   We encourage a limited consumption of CLEAR LIQUIDS after midnight the night before your surgery UP TO 2 HRS PRIOR TO YOUR SX START TIME.     Acceptable Clear Liquids are listed below:    Water, Gatorade/Powerade sports drinks, Apple juice (no pulp) Black coffee with without sugar/NO milk, cream or creamer or Jello.     If you have received specific instructions from your Surgeon/Surgeon's staff, please follow their instructions.     Showering instructions, directions, leave all valuables at home, medication instructions for PM prior & am of procedure explained. Patient stated an understanding.      Patient denies any side effects or issues with anesthesia or sedation.                                                                                                                                    Patient does not know arrival time. Explained that this information comes from the surgeons office and if they have not heard from them by 2 pm, to call office. Patient stated an understanding.    PT'S  - [facility] WILL BE TRANSPORTING PT HOME UPON D/C.

## 2019-12-10 NOTE — ANESTHESIA PREPROCEDURE EVALUATION
[facility]  Anesthesia Pre-Operative Evaluation         Patient Name: Pastora Cota  YOB: 1965  MRN: [id number]    SUBJECTIVE:     Pre-operative evaluation for Procedure(s) (LRB):  PARATHYROIDECTOMY-with intra-op iPTH levels possible subtotal (Right)     12/10/2019    Pastora Cota is a 54 y.o. female w/ a significant PMHx of HTN, HLD, and anemia who presents w/ hypercalcemia.    Patient now presents for the above procedure(s).      LDA:        Peripheral IV - Single Lumen 04/09/19 1100 Posterior;Right Hand (Active)   Number of days: 245       Prev airway: None documented.    Patient Active Problem List   Diagnosis    Essential hypertension    Hyperlipidemia    Multinodular goiter    IGT (impaired glucose tolerance)    Ocular migraine    Nuclear sclerosis    Special screening for malignant neoplasms, colon    Parapneumonic effusion    Primary hyperparathyroidism    Osteoporosis    Infectious colitis    Hypokalemia    Urine retention    Anemia    Vitreous floaters of both eyes    Pinguecula, bilateral    Alpha thalassemia 2       Review of patient's allergies indicates:   Allergen Reactions    Sulfa (sulfonamide antibiotics) Hives and Edema       Current Inpatient Medications:      No current facility-administered medications on file prior to encounter.      Current Outpatient Medications on File Prior to Encounter   Medication Sig Dispense Refill    acetaminophen (TYLENOL) 500 MG tablet Take 2 tablets (1,000 mg total) by mouth every 6 (six) hours as needed.  0    amLODIPine (NORVASC) 10 MG tablet Take 1 tablet (10 mg total) by mouth once daily. 90 tablet 3    cholecalciferol, vitamin D3, 50,000 unit capsule Take 1 capsule (50,000 Units total) by mouth once a week. 12 capsule 3    furosemide (LASIX) 20 MG tablet Take 1 tablet (20 mg total) by mouth once daily. 90 tablet 3    losartan (COZAAR) 100 MG tablet Take 1 tablet (100 mg total) by mouth  every evening. 90 tablet 3    metoprolol tartrate (LOPRESSOR) 25 MG tablet Take 0.5 tablets (12.5 mg total) by mouth 2 (two) times daily. 90 tablet 2    spironolactone (ALDACTONE) 25 MG tablet Take 1 tablet (25 mg total) by mouth once daily. (Patient taking differently: Take 25 mg by mouth every evening. ) 90 tablet 2    cetirizine (ZYRTEC) 10 MG tablet Take 1 tablet (10 mg total) by mouth daily as needed for Allergies. (Patient not taking: Reported on 11/25/2019)  0    hydrOXYzine HCl (ATARAX) 25 MG tablet Take 1 tablet (25 mg total) by mouth 3 (three) times daily. (Patient taking differently: Take 25 mg by mouth 3 (three) times daily as needed. ) 45 tablet 1       Past Surgical History:   Procedure Laterality Date    ABDOMINAL SURGERY      CHOLECYSTECTOMY      1993    COLONOSCOPY N/A 9/24/2016    Procedure: COLONOSCOPY;  Surgeon: Johnny Clayton MD;  Location: [address]);  Service: Endoscopy;  Laterality: N/A;    COLONOSCOPY N/A 12/27/2017    Procedure: COLONOSCOPY;  Surgeon: Johnny Clayton MD;  Location: [facility] ([address] [place] [address]);  Service: Endoscopy;  Laterality: N/A;    COLPOSCOPY      DILATION AND CURETTAGE OF UTERUS      HYSTERECTOMY      2007 tahbso     OOPHORECTOMY         Social History     Socioeconomic History    Marital status:      Spouse name: Not on file    Number of children: Not on file    Years of education: Not on file    Highest education level: Not on file   Occupational History    Occupation:      Employer: [employer]    Social Needs    Financial resource strain: Not very hard    Food insecurity:     Worry: Never true     Inability: Never true    Transportation needs:     Medical: Not on file     Non-medical: No   Tobacco Use    Smoking status: Never Smoker    Smokeless tobacco: Never Used   Substance and Sexual Activity    Alcohol use: Yes     Alcohol/week: 0.0 standard drinks     Types: 1 Standard drinks or equivalent per week      Frequency: Monthly or less     Drinks per session: 1 or 2     Binge frequency: Never     Comment: socailly    Drug use: No    Sexual activity: Yes     Partners: Male     Birth control/protection: None, See Surgical Hx     Comment: ; hysterectomy   Lifestyle    Physical activity:     Days per week: 0 days     Minutes per session: 0 min    Stress: To some extent   Relationships    Social connections:     Talks on phone: Not on file     Gets together: Once a week     Attends Alevism service: Not on file     Active member of club or organization: No     Attends meetings of clubs or organizations: Not on file     Relationship status:    Other Topics Concern    Are you pregnant or think you may be? Not Asked    Breast-feeding Not Asked   Social History Narrative    No regular exercise     with 1 child       OBJECTIVE:     Vital Signs Range (Last 24H):         Significant Labs:  Lab Results   Component Value Date    WBC 7.94 11/21/2019    HGB 12.8 11/21/2019    HCT 42.2 11/21/2019     11/21/2019    CHOL 130 04/07/2019    TRIG 32 04/07/2019    HDL 38 (L) 04/07/2019    ALT 18 11/21/2019    AST 16 11/21/2019     11/21/2019    K 4.1 11/21/2019     11/21/2019    CREATININE 0.9 11/21/2019    BUN 19 11/21/2019    CO2 26 11/21/2019    TSH 0.906 07/05/2019    INR 1.0 04/06/2019    HGBA1C 5.5 04/07/2019       Diagnostic Studies: No relevant studies.    EKG:   Results for orders placed or performed during the hospital encounter of 07/05/19   SCHEDULED EKG 12-LEAD (to Muse)    Collection Time: 07/08/19  3:58 PM    Narrative    Test Reason : E04.2,R00.2,    Vent. Rate : 062 BPM     Atrial Rate : 062 BPM     P-R Int : 162 ms          QRS Dur : 096 ms      QT Int : 384 ms       P-R-T Axes : 060 060 063 degrees     QTc Int : 389 ms    Normal sinus rhythm  Normal ECG    Confirmed by Candido Chatman MD ([id number]) on 7/9/2019 12:20:25 PM  Also confirmed by Chetna Romero MD ([id number])  on 7/9/2019  12:21:54 PM    Referred By: ADRY REIS           Confirmed By:Chetna Romero MD       2D ECHO:  TTE:  Results for orders placed or performed during the hospital encounter of 12/10/18   Transthoracic echo (TTE) complete (Cupid Only)   Result Value Ref Range    BSA 1.87 m2    TDI SEPTAL 0.06     LV LATERAL E/E' RATIO 7.00     LV SEPTAL E/E' RATIO 11.67     LA WIDTH 4.17 cm    AORTIC VALVE CUSP SEPERATION 1.64 cm    TDI LATERAL 0.10     PV PEAK VELOCITY 1.07 cm/s    LVIDD 3.59 3.5 - 6.0 cm    IVS 1.05 0.6 - 1.1 cm    PW 0.91 0.6 - 1.1 cm    Ao root annulus 2.33 cm    LVIDS 2.29 2.1 - 4.0 cm    FS 36 28 - 44 %    LA volume 51.46 cm3    Sinus 2.52 cm    STJ 2.33 cm    Ascending aorta 2.80 cm    LV mass 104.34 g    LA size 2.98 cm    RVDD 2.59 cm    TAPSE 2.11 cm    RV S' 21.95 m/s    Left Ventricle Relative Wall Thickness 0.51 cm    AV mean gradient 4.68 mmHg    AV valve area 2.66 cm2    AV index (prosthetic) 0.91     E/A ratio 0.97     Mean e' 0.08     E wave decelartion time 190.42 msec    IVRT 0.08 msec    Pulm vein S/D ratio 1.39     LVOT diameter 1.93 cm    LVOT area 2.92 cm2    LVOT peak VTI 23.79 cm    Ao peak francesco 1.36 m/s    Ao VTI 26.13 cm    LVOT stroke volume 69.56 cm3    AV peak gradient 7.40 mmHg    E/E' ratio 8.75     MV Peak E Francesco 0.70 m/s    TR Max Francesco 2.03 m/s    MV Peak A Francesco 0.72 m/s    PV Peak S Francesco 0.53 m/s    PV Peak D Francesco 0.38 m/s    LV Systolic Volume 18.01 mL    LV Systolic Volume Index 9.9 mL/m2    LV Diastolic Volume 53.96 mL    LV Diastolic Volume Index 29.56 mL/m2    LA Volume Index 28.2 mL/m2    LV Mass Index 57.2 g/m2    RA Major Axis 4.39 cm    Left Atrium Minor Axis 4.76 cm    Left Atrium Major Axis 4.99 cm    Triscuspid Valve Regurgitation Peak Gradient 16.48 mmHg    RA Width 2.82 cm    Narrative    · Concentric left ventricular remodeling.  · Normal left ventricular systolic function. The estimated ejection   fraction is 67%  · Grade I (mild) left ventricular diastolic dysfunction  consistent with   impaired relaxation.  · Normal left atrial pressure.  · Mild left atrial enlargement.  · Mild tricuspid regurgitation.  · Mild pulmonic regurgitation.          JEREMIAH:  No results found for this or any previous visit.    ASSESSMENT/PLAN:       Anesthesia Evaluation    I have reviewed the Patient Summary Reports.    I have reviewed the Nursing Notes.   I have reviewed the Medications.     Review of Systems  Anesthesia Hx:  No problems with previous Anesthesia  History of prior surgery of interest to airway management or planning: Denies Family Hx of Anesthesia complications.   Denies Personal Hx of Anesthesia complications.   Hematology/Oncology:         -- Anemia:   Cardiovascular:   Hypertension hyperlipidemia    Pulmonary:  Pulmonary Normal  Denies Shortness of breath.    Renal/:  Renal/ Normal     Hepatic/GI:   Denies GERD.    Musculoskeletal:  Musculoskeletal Normal    Neurological:  Neurology Normal    Endocrine:   Hyperparathyroidism   Psych:  Psychiatric Normal           Physical Exam  General:  Well nourished    Airway/Jaw/Neck:  Airway Findings: Mouth Opening: Normal Tongue: Normal  General Airway Assessment: Adult  Mallampati: II  TM Distance: Normal, at least 6 cm  Jaw/Neck Findings:  Neck ROM: Normal ROM      Dental:  Dental Findings: In tact   Chest/Lungs:  Chest/Lungs Findings: Clear to auscultation, Normal Respiratory Rate     Heart/Vascular:  Heart Findings: Rate: Normal  Rhythm: Regular Rhythm  Sounds: Normal        Mental Status:  Mental Status Findings:  Cooperative, Alert and Oriented         Anesthesia Plan  Type of Anesthesia, risks & benefits discussed:  Anesthesia Type:  general  Patient's Preference:   Intra-op Monitoring Plan: standard ASA monitors  Intra-op Monitoring Plan Comments:   Post Op Pain Control Plan: per primary service following discharge from PACU, IV/PO Opioids PRN and multimodal analgesia  Post Op Pain Control Plan Comments:   Induction:   IV  Beta  Blocker:  Patient is on a Beta-Blocker and has received one dose within the past 24 hours (No further documentation required).       Informed Consent: Patient understands risks and agrees with Anesthesia plan.  Questions answered. Anesthesia consent signed with patient.  ASA Score: 2     Day of Surgery Review of History & Physical:    H&P update referred to the surgeon.         Ready For Surgery From Anesthesia Perspective.

## 2019-12-10 NOTE — TELEPHONE ENCOUNTER
Spoke to Patient.  Arrival time of 0915 given to check in at the Surgery Center on the 2nd Floor of the Hospital on [facility].  Instructed to wash up tonight and in the morning with an antibacterial soap, not to wear anything metal or to apply any lotions, powders, or deodorant, and to wear something easy to remove.  Stated that she received clear liquid instructions from the Pre-Op Center.  Patient verbalized understanding of instructions.    Spoke to Abeba in [facility] to verify the need for intra-op PTH monitoring with the OR start time of 1040.

## 2019-12-11 ENCOUNTER — ANESTHESIA (OUTPATIENT)
Dept: SURGERY | Facility: HOSPITAL | Age: 54
End: 2019-12-11
Payer: COMMERCIAL

## 2019-12-11 ENCOUNTER — HOSPITAL ENCOUNTER (OUTPATIENT)
Facility: HOSPITAL | Age: 54
Discharge: HOME | End: 2019-12-11
Attending: SURGERY | Admitting: SURGERY
Payer: COMMERCIAL

## 2019-12-11 VITALS
HEART RATE: 80 BPM | RESPIRATION RATE: 18 BRPM | WEIGHT: 167 LBS | SYSTOLIC BLOOD PRESSURE: 116 MMHG | TEMPERATURE: 98 F | HEIGHT: 65 IN | OXYGEN SATURATION: 100 % | DIASTOLIC BLOOD PRESSURE: 65 MMHG | BODY MASS INDEX: 27.82 KG/M2

## 2019-12-11 DIAGNOSIS — E21.0 HYPERPARATHYROIDISM, PRIMARY: Primary | ICD-10-CM

## 2019-12-11 LAB
PTH-INTACT SERPL-MCNC: 301 PG/ML (ref 9–77)
PTH-INTACT SERPL-MCNC: 50 PG/ML (ref 9–77)
PTH-INTACT SERPL-MCNC: 71 PG/ML (ref 9–77)

## 2019-12-11 PROCEDURE — 60500 EXPLORE PARATHYROID GLANDS: CPT | Mod: RT,,, | Performed by: SURGERY

## 2019-12-11 PROCEDURE — 63600175 PHARM REV CODE 636 W HCPCS: Performed by: STUDENT IN AN ORGANIZED HEALTH CARE EDUCATION/TRAINING PROGRAM

## 2019-12-11 PROCEDURE — D9220A PRA ANESTHESIA: ICD-10-PCS | Mod: ,,, | Performed by: ANESTHESIOLOGY

## 2019-12-11 PROCEDURE — 36000706: Performed by: SURGERY

## 2019-12-11 PROCEDURE — 88331 PR  PATH CONSULT IN SURG,W FRZ SEC: ICD-10-PCS | Mod: 26,,, | Performed by: PATHOLOGY

## 2019-12-11 PROCEDURE — 25000003 PHARM REV CODE 250: Performed by: STUDENT IN AN ORGANIZED HEALTH CARE EDUCATION/TRAINING PROGRAM

## 2019-12-11 PROCEDURE — 60500 PR EXPLORE PARATHYROID GLANDS: ICD-10-PCS | Mod: RT,,, | Performed by: SURGERY

## 2019-12-11 PROCEDURE — 83970 ASSAY OF PARATHORMONE: CPT | Mod: 91

## 2019-12-11 PROCEDURE — 88305 TISSUE EXAM BY PATHOLOGIST: ICD-10-PCS | Mod: 26,,, | Performed by: PATHOLOGY

## 2019-12-11 PROCEDURE — 38510 BIOPSY/REMOVAL LYMPH NODES: CPT | Mod: 51,RT,, | Performed by: SURGERY

## 2019-12-11 PROCEDURE — 88331 PATH CONSLTJ SURG 1 BLK 1SPC: CPT | Performed by: PATHOLOGY

## 2019-12-11 PROCEDURE — 27201423 OPTIME MED/SURG SUP & DEVICES STERILE SUPPLY: Performed by: SURGERY

## 2019-12-11 PROCEDURE — 88305 TISSUE EXAM BY PATHOLOGIST: CPT | Mod: 26,,, | Performed by: PATHOLOGY

## 2019-12-11 PROCEDURE — 27201037 HC PRESSURE MONITORING SET UP

## 2019-12-11 PROCEDURE — 88331 PATH CONSLTJ SURG 1 BLK 1SPC: CPT | Mod: 26,,, | Performed by: PATHOLOGY

## 2019-12-11 PROCEDURE — 37000008 HC ANESTHESIA 1ST 15 MINUTES: Performed by: SURGERY

## 2019-12-11 PROCEDURE — 94761 N-INVAS EAR/PLS OXIMETRY MLT: CPT

## 2019-12-11 PROCEDURE — 71000015 HC POSTOP RECOV 1ST HR: Performed by: SURGERY

## 2019-12-11 PROCEDURE — 88305 TISSUE EXAM BY PATHOLOGIST: CPT | Performed by: PATHOLOGY

## 2019-12-11 PROCEDURE — 71000033 HC RECOVERY, INTIAL HOUR: Performed by: SURGERY

## 2019-12-11 PROCEDURE — 37000009 HC ANESTHESIA EA ADD 15 MINS: Performed by: SURGERY

## 2019-12-11 PROCEDURE — 36000707: Performed by: SURGERY

## 2019-12-11 PROCEDURE — 38510 PR BIOPSY/REM LYMPH NODES, CERVICAL: ICD-10-PCS | Mod: 51,RT,, | Performed by: SURGERY

## 2019-12-11 PROCEDURE — D9220A PRA ANESTHESIA: Mod: ,,, | Performed by: ANESTHESIOLOGY

## 2019-12-11 RX ORDER — PHENYLEPHRINE HYDROCHLORIDE 10 MG/ML
INJECTION INTRAVENOUS
Status: DISCONTINUED | OUTPATIENT
Start: 2019-12-11 | End: 2019-12-11

## 2019-12-11 RX ORDER — ONDANSETRON 2 MG/ML
INJECTION INTRAMUSCULAR; INTRAVENOUS
Status: DISCONTINUED | OUTPATIENT
Start: 2019-12-11 | End: 2019-12-11

## 2019-12-11 RX ORDER — ROCURONIUM BROMIDE 10 MG/ML
INJECTION, SOLUTION INTRAVENOUS
Status: DISCONTINUED | OUTPATIENT
Start: 2019-12-11 | End: 2019-12-11

## 2019-12-11 RX ORDER — EPHEDRINE SULFATE 50 MG/ML
INJECTION, SOLUTION INTRAVENOUS
Status: DISCONTINUED | OUTPATIENT
Start: 2019-12-11 | End: 2019-12-11

## 2019-12-11 RX ORDER — CEFAZOLIN SODIUM 1 G/3ML
INJECTION, POWDER, FOR SOLUTION INTRAMUSCULAR; INTRAVENOUS
Status: DISCONTINUED | OUTPATIENT
Start: 2019-12-11 | End: 2019-12-11

## 2019-12-11 RX ORDER — CEFAZOLIN SODIUM 1 G/3ML
2 INJECTION, POWDER, FOR SOLUTION INTRAMUSCULAR; INTRAVENOUS
Status: DISCONTINUED | OUTPATIENT
Start: 2019-12-11 | End: 2019-12-11 | Stop reason: HOSPADM

## 2019-12-11 RX ORDER — DEXAMETHASONE SODIUM PHOSPHATE 4 MG/ML
INJECTION, SOLUTION INTRA-ARTICULAR; INTRALESIONAL; INTRAMUSCULAR; INTRAVENOUS; SOFT TISSUE
Status: DISCONTINUED | OUTPATIENT
Start: 2019-12-11 | End: 2019-12-11

## 2019-12-11 RX ORDER — ACETAMINOPHEN 10 MG/ML
INJECTION, SOLUTION INTRAVENOUS
Status: DISCONTINUED | OUTPATIENT
Start: 2019-12-11 | End: 2019-12-11

## 2019-12-11 RX ORDER — LIDOCAINE HYDROCHLORIDE 10 MG/ML
1 INJECTION, SOLUTION EPIDURAL; INFILTRATION; INTRACAUDAL; PERINEURAL ONCE
Status: DISCONTINUED | OUTPATIENT
Start: 2019-12-11 | End: 2019-12-11 | Stop reason: HOSPADM

## 2019-12-11 RX ORDER — PROPOFOL 10 MG/ML
VIAL (ML) INTRAVENOUS
Status: DISCONTINUED | OUTPATIENT
Start: 2019-12-11 | End: 2019-12-11

## 2019-12-11 RX ORDER — ONDANSETRON 2 MG/ML
4 INJECTION INTRAMUSCULAR; INTRAVENOUS EVERY 12 HOURS PRN
Status: DISCONTINUED | OUTPATIENT
Start: 2019-12-11 | End: 2019-12-11 | Stop reason: HOSPADM

## 2019-12-11 RX ORDER — ONDANSETRON 2 MG/ML
4 INJECTION INTRAMUSCULAR; INTRAVENOUS DAILY PRN
Status: DISCONTINUED | OUTPATIENT
Start: 2019-12-11 | End: 2019-12-11 | Stop reason: HOSPADM

## 2019-12-11 RX ORDER — FENTANYL CITRATE 50 UG/ML
25 INJECTION, SOLUTION INTRAMUSCULAR; INTRAVENOUS EVERY 5 MIN PRN
Status: DISCONTINUED | OUTPATIENT
Start: 2019-12-11 | End: 2019-12-11 | Stop reason: HOSPADM

## 2019-12-11 RX ORDER — SUCCINYLCHOLINE CHLORIDE 20 MG/ML
INJECTION INTRAMUSCULAR; INTRAVENOUS
Status: DISCONTINUED | OUTPATIENT
Start: 2019-12-11 | End: 2019-12-11

## 2019-12-11 RX ORDER — NEOSTIGMINE METHYLSULFATE 1 MG/ML
INJECTION, SOLUTION INTRAVENOUS
Status: DISCONTINUED | OUTPATIENT
Start: 2019-12-11 | End: 2019-12-11

## 2019-12-11 RX ORDER — SODIUM CHLORIDE 9 MG/ML
INJECTION, SOLUTION INTRAVENOUS CONTINUOUS
Status: DISCONTINUED | OUTPATIENT
Start: 2019-12-11 | End: 2019-12-11 | Stop reason: HOSPADM

## 2019-12-11 RX ORDER — GLYCOPYRROLATE 0.2 MG/ML
INJECTION INTRAMUSCULAR; INTRAVENOUS
Status: DISCONTINUED | OUTPATIENT
Start: 2019-12-11 | End: 2019-12-11

## 2019-12-11 RX ORDER — LIDOCAINE HCL/PF 100 MG/5ML
SYRINGE (ML) INTRAVENOUS
Status: DISCONTINUED | OUTPATIENT
Start: 2019-12-11 | End: 2019-12-11

## 2019-12-11 RX ORDER — HYDROCODONE BITARTRATE AND ACETAMINOPHEN 5; 325 MG/1; MG/1
1 TABLET ORAL EVERY 6 HOURS PRN
Qty: 20 TABLET | Refills: 0 | Status: SHIPPED | OUTPATIENT
Start: 2019-12-11 | End: 2020-01-08

## 2019-12-11 RX ORDER — FENTANYL CITRATE 50 UG/ML
INJECTION, SOLUTION INTRAMUSCULAR; INTRAVENOUS
Status: DISCONTINUED | OUTPATIENT
Start: 2019-12-11 | End: 2019-12-11

## 2019-12-11 RX ORDER — MIDAZOLAM HYDROCHLORIDE 1 MG/ML
INJECTION, SOLUTION INTRAMUSCULAR; INTRAVENOUS
Status: DISCONTINUED | OUTPATIENT
Start: 2019-12-11 | End: 2019-12-11

## 2019-12-11 RX ORDER — SODIUM CHLORIDE 0.9 % (FLUSH) 0.9 %
10 SYRINGE (ML) INJECTION
Status: DISCONTINUED | OUTPATIENT
Start: 2019-12-11 | End: 2019-12-11 | Stop reason: HOSPADM

## 2019-12-11 RX ADMIN — PHENYLEPHRINE HYDROCHLORIDE 100 MCG: 10 INJECTION INTRAVENOUS at 12:12

## 2019-12-11 RX ADMIN — DEXAMETHASONE SODIUM PHOSPHATE 4 MG: 4 INJECTION, SOLUTION INTRAMUSCULAR; INTRAVENOUS at 11:12

## 2019-12-11 RX ADMIN — ONDANSETRON 4 MG: 2 INJECTION INTRAMUSCULAR; INTRAVENOUS at 12:12

## 2019-12-11 RX ADMIN — SUCCINYLCHOLINE CHLORIDE 100 MG: 20 INJECTION, SOLUTION INTRAMUSCULAR; INTRAVENOUS at 11:12

## 2019-12-11 RX ADMIN — ROCURONIUM BROMIDE 5 MG: 10 INJECTION, SOLUTION INTRAVENOUS at 11:12

## 2019-12-11 RX ADMIN — PROPOFOL 160 MG: 10 INJECTION, EMULSION INTRAVENOUS at 11:12

## 2019-12-11 RX ADMIN — PHENYLEPHRINE HYDROCHLORIDE 200 MCG: 10 INJECTION INTRAVENOUS at 12:12

## 2019-12-11 RX ADMIN — GLYCOPYRROLATE 0.6 MG: 0.2 INJECTION, SOLUTION INTRAMUSCULAR; INTRAVENOUS at 12:12

## 2019-12-11 RX ADMIN — PHENYLEPHRINE HYDROCHLORIDE 100 MCG: 10 INJECTION INTRAVENOUS at 11:12

## 2019-12-11 RX ADMIN — ROCURONIUM BROMIDE 30 MG: 10 INJECTION, SOLUTION INTRAVENOUS at 11:12

## 2019-12-11 RX ADMIN — LIDOCAINE HYDROCHLORIDE 100 MG: 20 INJECTION, SOLUTION INTRAVENOUS at 11:12

## 2019-12-11 RX ADMIN — SODIUM CHLORIDE: 0.9 INJECTION, SOLUTION INTRAVENOUS at 10:12

## 2019-12-11 RX ADMIN — PHENYLEPHRINE HYDROCHLORIDE 200 MCG: 10 INJECTION INTRAVENOUS at 11:12

## 2019-12-11 RX ADMIN — NEOSTIGMINE METHYLSULFATE 5 MG: 1 INJECTION INTRAVENOUS at 12:12

## 2019-12-11 RX ADMIN — FENTANYL CITRATE 100 MCG: 50 INJECTION, SOLUTION INTRAMUSCULAR; INTRAVENOUS at 11:12

## 2019-12-11 RX ADMIN — ACETAMINOPHEN 1000 MG: 10 INJECTION, SOLUTION INTRAVENOUS at 11:12

## 2019-12-11 RX ADMIN — ROCURONIUM BROMIDE 10 MG: 10 INJECTION, SOLUTION INTRAVENOUS at 12:12

## 2019-12-11 RX ADMIN — SODIUM CHLORIDE, SODIUM GLUCONATE, SODIUM ACETATE, POTASSIUM CHLORIDE, MAGNESIUM CHLORIDE, SODIUM PHOSPHATE, DIBASIC, AND POTASSIUM PHOSPHATE: .53; .5; .37; .037; .03; .012; .00082 INJECTION, SOLUTION INTRAVENOUS at 11:12

## 2019-12-11 RX ADMIN — MIDAZOLAM HYDROCHLORIDE 2 MG: 1 INJECTION, SOLUTION INTRAMUSCULAR; INTRAVENOUS at 10:12

## 2019-12-11 RX ADMIN — EPHEDRINE SULFATE 10 MG: 50 INJECTION, SOLUTION INTRAMUSCULAR; INTRAVENOUS; SUBCUTANEOUS at 12:12

## 2019-12-11 RX ADMIN — CEFAZOLIN 2 G: 330 INJECTION, POWDER, FOR SOLUTION INTRAMUSCULAR; INTRAVENOUS at 11:12

## 2019-12-11 NOTE — PROGRESS NOTES
Patient tolerating p.o. Fluids without difficulty. After visit summary provided. Discharge education and teaching provided. Patient and spouse verbalized understanding. All concerns addressed. IV removed. Patient transported by staff via wheelchair.

## 2019-12-11 NOTE — TRANSFER OF CARE
"Anesthesia Transfer of Care Note    Patient: Pastora Cota    Procedure(s) Performed: Procedure(s) (LRB):  PARATHYROIDECTOMY-with intra-op iPTH levels possible subtotal (Right)    Patient location: PACU    Anesthesia Type: general    Transport from OR: Transported from OR on 6-10 L/min O2 by face mask with adequate spontaneous ventilation    Post pain: adequate analgesia    Post assessment: no apparent anesthetic complications and tolerated procedure well    Post vital signs: stable    Level of consciousness: awake and alert    Nausea/Vomiting: no nausea/vomiting    Complications: none          Last vitals:   Visit Vitals  /66 (BP Location: Right arm, Patient Position: Lying)   Pulse 85   Temp 36.1 °C (97 °F) (Temporal)   Resp 18   Ht 5' 5" (1.651 m)   Wt 75.8 kg (167 lb)   SpO2 99%   Breastfeeding? No   BMI 27.79 kg/m²     "

## 2019-12-11 NOTE — INTERVAL H&P NOTE
The patient has been examined and the H&P has been reviewed:    No acute interval changes.    Anesthesia/Surgery risks, benefits and alternative options discussed and understood by patient/family.          Active Hospital Problems    Diagnosis  POA    Hyperparathyroidism, primary [E21.0]  Yes      Resolved Hospital Problems   No resolved problems to display.

## 2019-12-11 NOTE — BRIEF OP NOTE
[facility]  Brief Operative Note     SUMMARY     Surgery Date: 12/11/2019     Surgeon(s) and Role:     * Kiko Wilson MD - Primary     * Félix Perez MD - Resident - Assisting    Pre-op Diagnosis:  Primary hyperparathyroidism [E21.0]    Post-op Diagnosis:  Post-Op Diagnosis Codes:     * Primary hyperparathyroidism [E21.0]    Procedure(s) (LRB):  PARATHYROIDECTOMY-with intra-op iPTH levels possible subtotal (Right)    Anesthesia: General    Description of the findings of the procedure: Right parathyroidectomy with intra-op PTH monitoring    Findings/Key Components: Right inferior adenoma - hypercellular on frozen  Time 0 PTH - 301  Time 5 mins PTH - 71  Time 20 mins PTH - 50    Estimated Blood Loss: 5mL         Specimens:   Specimen (12h ago, onward)    None          Discharge Note    SUMMARY     Admit Date: 12/11/2019    Discharge Date and Time:  12/11/2019 1:06 PM    Hospital Course (synopsis of major diagnoses, care, treatment, and services provided during the course of the hospital stay): The patient was admitted and underwent the above listed procedures without apparent complication. Patient discharged home in stable condition.        Final Diagnosis: Post-Op Diagnosis Codes:     * Primary hyperparathyroidism [E21.0]    Disposition: Home or Self Care    Follow Up/Patient Instructions:     Medications:  Reconciled Home Medications:      Medication List      START taking these medications    HYDROcodone-acetaminophen 5-325 mg per tablet  Commonly known as:  NORCO  Take 1 tablet by mouth every 6 (six) hours as needed for Pain.        CHANGE how you take these medications    hydrOXYzine HCl 25 MG tablet  Commonly known as:  ATARAX  Take 1 tablet (25 mg total) by mouth 3 (three) times daily.  What changed:    · when to take this  · reasons to take this     spironolactone 25 MG tablet  Commonly known as:  ALDACTONE  Take 1 tablet (25 mg total) by mouth once daily.  What changed:  when to take  this        CONTINUE taking these medications    acetaminophen 500 MG tablet  Commonly known as:  TYLENOL  Take 2 tablets (1,000 mg total) by mouth every 6 (six) hours as needed.     amLODIPine 10 MG tablet  Commonly known as:  NORVASC  Take 1 tablet (10 mg total) by mouth once daily.     cetirizine 10 MG tablet  Commonly known as:  ZYRTEC  Take 1 tablet (10 mg total) by mouth daily as needed for Allergies.     cholecalciferol (vitamin D3) 50,000 unit capsule  Take 1 capsule (50,000 Units total) by mouth once a week.     Fluzone Quad [id number] (PF) 60 mcg (15 mcg x 4)/0.5 mL Syrg  Generic drug:  flu vacc [id number] 6mos up(PF)  PHARMACIST ADMINISTERED IMMUNIZATION ADMINISTERED AT TIME OF DISPENSING     furosemide 20 MG tablet  Commonly known as:  LASIX  Take 1 tablet (20 mg total) by mouth once daily.     losartan 100 MG tablet  Commonly known as:  COZAAR  Take 1 tablet (100 mg total) by mouth every evening.     metoprolol tartrate 25 MG tablet  Commonly known as:  LOPRESSOR  Take 0.5 tablets (12.5 mg total) by mouth 2 (two) times daily.          Discharge Procedure Orders   Diet Adult Regular     Notify your health care provider if you experience any of the following:  redness, tenderness, or signs of infection (pain, swelling, redness, odor or green/yellow discharge around incision site)     Notify your health care provider if you experience any of the following:  severe uncontrolled pain     Notify your health care provider if you experience any of the following:  persistent nausea and vomiting or diarrhea     Notify your health care provider if you experience any of the following:  temperature >100.4     Notify your health care provider if you experience any of the following:   Order Comments: Numbness or tingling on the finger tips or around mouth.   Muscles cramps.     No dressing needed   Order Comments: No dressing needed to cover the incision(s). There is purple skin glue that was placed during surgery. This  will fall off in 7-10 days.   You can shower in 24 hours with regular soap and water. Please do not soak in a bathtub or pool for at least 2 weeks after surgery.     Activity as tolerated     Follow-up Information     Kiko Wilson MD In 2 weeks.    Specialty:  General Surgery  Why:  For wound re-check  Contact information:  [address]  [facility] [address]  [phone]

## 2019-12-11 NOTE — DISCHARGE INSTRUCTIONS
Incision Care  Remember: Follow-up visits allow your healthcare provider to make sure your incision is healing well. Be sure to keep your appointments.     Stitches (sutures), surgical staples, special strips of surgical tape, or surgical skin glue may be used to close incisions. They also help stop bleeding and speed healing. To help your incision heal, follow the tips on this handout.  Home care  Tips for home care include the following:  · Always wash your hands before touching your incision.  · Keep your incision clean and dry.  · Avoid doing things that could cause dirt or sweat to get on your incision.  · Dont pick at scabs. They help protect the wound.  · Keep your incision out of water.  · Take a sponge bath to avoid getting your incision wet, unless your healthcare provider tells you otherwise.  · Ask your provider when can you take a shower or bathe.  · Ask your provider about the best way to keep your incision dry when bathing or showering.  · Pat stitches dry if they get wet. Dont rub.  · Leave the bandage (dressing) in place until you are told to remove it or change it. Change it only as directed, using clean hands.  · After the first 12 hours, change your dressing every 24 hours, or as directed by your healthcare provider.  · Change your dressing if it gets wet or soiled.  Care for specific closures  Follow these guidelines unless your healthcare provider tells you otherwise:  · Stitches or staples. Once you no longer need to keep these dry, clean the wound daily. First remove the bandage using clean hands. Then wash the area gently with soap and warm water. Use a wet cotton swab to loosen and remove any blood or crust that forms. After cleaning, put a thin layer of antibiotic ointment on. Then put on a new bandage.  · Skin glue. Dont put liquid, ointment, or cream on your wound while the glue is in place. Avoid activities that cause heavy sweating. Protect the wound from sunlight. Do not scratch,  rub, or pick at the glue. Do not put tape directly over the glue. The glue should peel off within 5 to 10 days.  · Surgical tape. Keep the area dry. If it gets wet, blot the area dry with a clean towel. Surgical tape usually falls off within 7 to 10 days. If it has not fallen off after 10 days, contact your healthcare provider before taking it off yourself. If you are told to remove the tape, put mineral oil or petroleum jelly on a cotton ball. Gently rub the tape until it is removed.  Changing your dressing  Leave the dressing (bandage) in place until you are told to remove it or change it. Follow the instructions below unless told otherwise by your healthcare provider:  · Always wash your hands before changing your dressing.  · After the first 48 hours, the incision wound usually will have closed. At this point, leave the incision uncovered and open to the air. If the incision has not closed keep it covered.  · Cover your incision only if your clothing is rubbing it or causing irritation.  · Change your dressing if it gets wet or soiled.  Follow-up care  Follow up with your healthcare provider to ask how long sutures or staples should be left in place. Be sure to return for stitch or staple removal as directed. If dissolving stitches were used in your mouth, these will not need to be removed. They should fall out or dissolve on their own.  If tape closures were used, remove them yourself when your provider recommends if they have not fallen off on their own. If skin glue was used, the glue will wear off by itself.  When to seek medical care  Call your healthcare provider if you have any of the following:  · More pain, redness, swelling, bleeding, or foul-smelling discharge around the incision area  · Fever of 100.4°F (38ºC) or higher, or as directed by your healthcare provider  · Shaking chills  · Vomiting or nausea that doesn't go away  · Numbness, coldness, or tingling around the incision area, or changes in  skin color  · Opening of the sutures or wound  · Stitches or staples come apart or fall out or surgical tape falls off before 7 days or as directed by your healthcare provider   Date Last Reviewed: 12/1/2016  © 2129-7373 [employer]. [address] [place] [address], [place] [address]. All rights reserved. This information is not intended as a substitute for professional medical care. Always follow your healthcare professional's instructions.

## 2019-12-12 NOTE — OP NOTE
DATE OF PROCEDURE:  12/11/2019    PRIMARY SURGEON:  Kiko Wilson M.D.    ASSISTANT SURGEON:  Félix Perez M.D. (RES), Surgery resident.    PREOPERATIVE DIAGNOSIS:  Primary hyperparathyroidism with suspected parathyroid   gland adenoma of the right lower inferior parathyroid gland.    POSTOPERATIVE DIAGNOSIS:  Primary hyperparathyroidism with suspected parathyroid   gland adenoma of the right lower inferior parathyroid gland with intraoperative   findings consistent with single gland parathyroid adenoma of the right lower   inferior parathyroid gland.    PROCEDURES PERFORMED:  Right neck exploration with parathyroidectomy of the   right lower inferior parathyroid gland with intraoperative intact parathyroid   hormone levels x3; excisional lymph node biopsy of the right neck.    PROCEDURE IN DETAIL:  The patient underwent informed consent.  The history and   physical examination was reviewed and updated.  The 4D CT scan parathyroid   images of the neck were reviewed.  The patient was brought to the Operating   Room.  She underwent general endotracheal anesthesia.  She was placed in a   supine position.  The head and neck were extended with a transverse roll behind   her shoulder blades.  Both arms were tucked.  A 4 to 5 cm incision was marked   within a natural skin crease in a curvilinear fashion transversely along the   lower neck, just above the sternal notch.  We marked the crease bilaterally, but   plan to incise on the right side only.  After sterile prep and drape of the   right neck, the skin incision was made in the right lower neck transversely as   marked above.  The skin was divided sharply.  The subcutaneous tissue was   divided with electrocautery as was the platysmal layer.  The subplatysmal flaps   were raised superiorly and inferiorly.  A self-retaining retractor was placed.    We  the strap muscles longitudinally in the midline and retracted the   right-sided sternohyoid and sternothyroid  muscles laterally to the right.  We   rolled the right thyroid lobe anteromedially keeping our dissection in the   extracapsular thyroidal plane.  We divided the middle thyroid vein and rolled   the thyroid gland further anteromedially.  We found the greater than 1 cm   reddish-brown well rounded parathyroid gland adenoma in the right lower inferior   position, just anterior, medial and inferior to the course of the recurrent   laryngeal nerve, which was identified and preserved.  The gland was removed,   dividing its polar vessel with the Harmonic scalpel Focus device.  It was sent   to Pathology for frozen section.  At this point, we telma a time 0 baseline level   for intraoperative intact parathyroid hormone from the right internal jugular   vein.  While we were waiting to draw the additional levels, we dissected to see   if we could find the right superior gland.  We found an extrathyroidal segment   of tissue, which was likely lymph node versus parathyroid versus thyroid tissue   and this was submitted to Pathology for frozen section after it was removed and   it was consistent with lymph node tissue.  We also found a very normal size 3 to   4 mm flat deeper yellow colored normal appearing parathyroid gland in the right   upper superior position, just superior, posterior and lateral to the course of   the nerve.  This confirmed our findings that we were likely dealing with a   single gland adenoma.  Frozen section did reveal a hypercellular parathyroid   gland with specimen #1 and benign lymph node tissue with specimen #2.  With the   normal appearing upper gland, we stopped our dissection and we did not dissect   the left side.  The left side was never dissected during this operation.  We   then telma a 5-minute post-excision level for intraoperative intact parathyroid   hormone level as well as a 20-minute post-excision level, all from the right   internal jugular vein.  The time 0 baseline level came back at  301, the 5-minute   post-excision level was already within the normal limits at 71 and the   20-minute post-excision was down even further down to a level of 50.  With this,   we had clinical findings, gross clinical findings and biochemical findings   consistent with surgical correction of primary hyperparathyroidism by removing a   solitary single right lower inferior parathyroid gland adenoma, which was   completed.  We kept the right upper superior gland in its normal position after   we grossly identified it.  With this, the procedure was completed.  The wound   was irrigated.  There was no evidence of bleeding.  Surgical Fibrillar was   placed along the right tracheoesophageal groove.  No drain was placed.  The   strap muscles were reapproximated in the midline with interrupted Vicryl   sutures.  The platysma was closed with a running 3-0 Vicryl suture and the skin   was closed with a running 4-0 Monocryl subcuticular skin closure.  Sterile skin   Dermabond was applied.  The patient was turned over to Anesthesia for extubation   and transported to the Recovery Area in a satisfactory condition.  Estimated   blood loss was minimal.  All needle, instrument and sponge counts were correct.      JANETH/IN  dd: 12/12/2019 07:15:33 (CST)  td: 12/12/2019 11:32:18 (CST)  Doc ID   #[id number]  Job ID #[id number]    CC:

## 2019-12-12 NOTE — ANESTHESIA POSTPROCEDURE EVALUATION
Anesthesia Post Evaluation    Patient: Pastora Cota    Procedure(s) Performed: Procedure(s) (LRB):  PARATHYROIDECTOMY-with intra-op iPTH levels possible subtotal (Right)    Final Anesthesia Type: general    Patient location during evaluation: PACU  Patient participation: Yes- Able to Participate  Level of consciousness: awake and alert  Post-procedure vital signs: reviewed and stable  Pain management: adequate  Airway patency: patent    PONV status at discharge: No PONV  Anesthetic complications: no      Cardiovascular status: blood pressure returned to baseline  Respiratory status: unassisted, spontaneous ventilation and room air  Hydration status: euvolemic  Follow-up not needed.          Vitals Value Taken Time   /65 12/11/2019  3:10 PM   Temp 36.6 °C (97.9 °F) 12/11/2019  3:10 PM   Pulse 80 12/11/2019  3:10 PM   Resp 18 12/11/2019  3:10 PM   SpO2 100 % 12/11/2019  3:10 PM         Event Time     Out of Recovery 13:31:00          Pain/Lissette Score: Lissette Score: 10 (12/11/2019  3:05 PM)

## 2019-12-19 ENCOUNTER — OFFICE VISIT (OUTPATIENT)
Dept: SURGERY | Facility: CLINIC | Age: 54
End: 2019-12-19
Payer: COMMERCIAL

## 2019-12-19 VITALS
SYSTOLIC BLOOD PRESSURE: 145 MMHG | HEART RATE: 92 BPM | BODY MASS INDEX: 27.85 KG/M2 | DIASTOLIC BLOOD PRESSURE: 70 MMHG | WEIGHT: 167.13 LBS | HEIGHT: 65 IN

## 2019-12-19 DIAGNOSIS — E21.0 PRIMARY HYPERPARATHYROIDISM: Primary | ICD-10-CM

## 2019-12-19 PROCEDURE — 99024 POSTOP FOLLOW-UP VISIT: CPT | Mod: S$GLB,,, | Performed by: SURGERY

## 2019-12-19 PROCEDURE — 99999 PR PBB SHADOW E&M-EST. PATIENT-LVL III: ICD-10-PCS | Mod: PBBFAC,,, | Performed by: SURGERY

## 2019-12-19 PROCEDURE — 99024 PR POST-OP FOLLOW-UP VISIT: ICD-10-PCS | Mod: S$GLB,,, | Performed by: SURGERY

## 2019-12-19 PROCEDURE — 99999 PR PBB SHADOW E&M-EST. PATIENT-LVL III: CPT | Mod: PBBFAC,,, | Performed by: SURGERY

## 2019-12-19 NOTE — PROGRESS NOTES
GENERAL SURGERY CLINIC   POST-OP NOTE    HPI:  Pastora Cota is a 54 y.o. female s/p right inferior parathyroidectomy on 12/11/19 for primary hyperparathyroidism who returns to clinic today for follow up visit. She has done well since surgery. Her pain is minimal. She is tolerating a regular diet and having normal bowel function. Incisions are healing appropriately.   Her voice is at baseline. She denies any symptoms of hypocalcemia.     Final pathology is still pending.     ROS: Negative except as stated in HPI.     Past Medical History:   Diagnosis Date    Abnormal Pap smear     repeat normal    Abnormal Pap smear of cervix     Abnormal Pap smear of vagina     Allergy     seasonal    AR (allergic rhinitis)     Hypercalcemia 9/10/2015    Hypertension     IGT (impaired glucose tolerance) 9/9/2014    Pneumonia 12/2016       Past Surgical History:   Procedure Laterality Date    ABDOMINAL SURGERY      CHOLECYSTECTOMY      1993    COLONOSCOPY N/A 9/24/2016    Procedure: COLONOSCOPY;  Surgeon: Johnny Clayton MD;  [facility]  Service: Endoscopy;  Laterality: N/A;    COLONOSCOPY N/A 12/27/2017    Procedure: COLONOSCOPY;  Surgeon: Johnny Clayton MD;  [facility]  Service: Endoscopy;  Laterality: N/A;    COLPOSCOPY      DILATION AND CURETTAGE OF UTERUS      HYSTERECTOMY      2007 tahbso     OOPHORECTOMY      PARATHYROIDECTOMY Right 12/11/2019    Procedure: PARATHYROIDECTOMY-with intra-op iPTH levels possible subtotal;  Surgeon: Kiko Wilson MD;  Location: [address];  Service: General;  Laterality: Right;       Social History     Socioeconomic History    Marital status:      Spouse name: Not on file    Number of children: Not on file    Years of education: Not on file    Highest education level: Not on file   Occupational History    Occupation:      Employer: [employer]    Social Needs    Financial resource  strain: Not very hard    Food insecurity:     Worry: Never true     Inability: Never true    Transportation needs:     Medical: Not on file     Non-medical: No   Tobacco Use    Smoking status: Never Smoker    Smokeless tobacco: Never Used   Substance and Sexual Activity    Alcohol use: Yes     Alcohol/week: 0.0 standard drinks     Types: 1 Standard drinks or equivalent per week     Frequency: Monthly or less     Drinks per session: 1 or 2     Binge frequency: Never     Comment: socailly    Drug use: No    Sexual activity: Yes     Partners: Male     Birth control/protection: None, See Surgical Hx     Comment: ; hysterectomy   Lifestyle    Physical activity:     Days per week: 0 days     Minutes per session: 0 min    Stress: To some extent   Relationships    Social connections:     Talks on phone: Not on file     Gets together: Once a week     Attends L[employer]-day service: Not on file     Active member of club or organization: No     Attends meetings of clubs or organizations: Not on file     Relationship status:    Other Topics Concern    Are you pregnant or think you may be? Not Asked    Breast-feeding Not Asked   Social History Narrative    No regular exercise     with 1 child       Review of patient's allergies indicates:   Allergen Reactions    Sulfa (sulfonamide antibiotics) Hives and Edema       PHYSICAL EXAM:  Vitals:    12/19/19 1339   BP: (!) 145/70   Pulse: 92       General: NAD  Neuro: AAOx3  Cardio: S1 and S2, RRR  Resp: CTAB, breathing even and unlabored  Abd: Soft, ND, NT  Ext: Warm and well perfused  Incisions: Anterior neck incision is c/d/i with some Dermabond still in place. No hematoma. No evidence of infection    Labs:  Reviewed.     Imaging:  Reviewed.     Pathology:  Pending.     ASSESSMENT/PLAN:  Pastora Cota is doing well s/p right inferior parathyroidectomy on 12/11/19 for primary hyperparathyroidism     - We will call her when final pathology becomes  available  - Can f/u with Dr. Kaba in 6 months   - Activity: As tolerated  - RTC KEN Perez MD  General Surgery PGYIII  [phone]    I have personally taken the history and examined this patient and agree with the resident's note as stated above.  The patient presents postoperatively status post a right lower inferior parathyroidectomy with normalization of intact parathyroid hormone levels intraoperatively from over 300 down to 51.  Postoperatively she is doing quite well and denies any subjective symptoms.  She denies any signs or symptoms of hypocalcemia or hungry bone syndrome.  She did not take any supplemental calcium carbonate postoperatively.  Her incision is well-healed.  She has no postoperative hoarseness.    Assessment and plan:  Doing well status post surgical correction of primary hyperparathyroidism following removal of a right lower inferior parathyroid gland adenoma.  She was given notice to return to work full time on 12/30/2019.  Will phone review her final pathology report when it becomes available.  She will otherwise follow up with me p.r.n.

## 2019-12-19 NOTE — LETTER
December 19, 2019      [facility]  [address]  [facility] [address]  Phone: [phone]       Patient: Pastora Cota   YOB: 1965  Date of Visit: 12/19/2019    To Whom It May Concern:    Sveta Cota  was at [facility] on 12/19/2019.She may return to work/school on 12/30/19 with no restrictions. If you have any questions or concerns, or if I can be of further assistance, please do not hesitate to contact me.    Sincerely,    Selene Carrizales RN

## 2019-12-30 ENCOUNTER — OFFICE VISIT (OUTPATIENT)
Dept: URGENT CARE | Facility: CLINIC | Age: 54
End: 2019-12-30
Payer: COMMERCIAL

## 2019-12-30 VITALS
TEMPERATURE: 99 F | RESPIRATION RATE: 16 BRPM | HEIGHT: 65 IN | DIASTOLIC BLOOD PRESSURE: 91 MMHG | WEIGHT: 167 LBS | SYSTOLIC BLOOD PRESSURE: 147 MMHG | BODY MASS INDEX: 27.82 KG/M2 | HEART RATE: 84 BPM | OXYGEN SATURATION: 97 %

## 2019-12-30 DIAGNOSIS — Z20.818 EXPOSURE TO STREP THROAT: ICD-10-CM

## 2019-12-30 DIAGNOSIS — J02.9 PHARYNGITIS, UNSPECIFIED ETIOLOGY: Primary | ICD-10-CM

## 2019-12-30 LAB
CTP QC/QA: YES
FINAL PATHOLOGIC DIAGNOSIS: NORMAL
FROZEN SECTION DIAGNOSIS: NORMAL
FROZEN SECTION FOOTNOTE: NORMAL
GROSS: NORMAL
S PYO RRNA THROAT QL PROBE: NEGATIVE

## 2019-12-30 PROCEDURE — 87880 POCT RAPID STREP A: ICD-10-PCS | Mod: QW,S$GLB,, | Performed by: FAMILY MEDICINE

## 2019-12-30 PROCEDURE — 99214 PR OFFICE/OUTPT VISIT, EST, LEVL IV, 30-39 MIN: ICD-10-PCS | Mod: S$GLB,,, | Performed by: FAMILY MEDICINE

## 2019-12-30 PROCEDURE — 99000 SPECIMEN HANDLING OFFICE-LAB: CPT | Mod: S$GLB,,, | Performed by: FAMILY MEDICINE

## 2019-12-30 PROCEDURE — 99000 PR SPECIMEN HANDLING,DR OFF->LAB: ICD-10-PCS | Mod: S$GLB,,, | Performed by: FAMILY MEDICINE

## 2019-12-30 PROCEDURE — 87081 CULTURE SCREEN ONLY: CPT

## 2019-12-30 PROCEDURE — 99214 OFFICE O/P EST MOD 30 MIN: CPT | Mod: S$GLB,,, | Performed by: FAMILY MEDICINE

## 2019-12-30 PROCEDURE — 87880 STREP A ASSAY W/OPTIC: CPT | Mod: QW,S$GLB,, | Performed by: FAMILY MEDICINE

## 2019-12-30 NOTE — PATIENT INSTRUCTIONS
PLEASE READ YOUR DISCHARGE INSTRUCTIONS ENTIRELY AS IT CONTAINS IMPORTANT INFORMATION.    A culture of your throat was sent. You will be contacted once it results in about 3 days and appropriate action will be taken.     Sore throat recommendations: Warm fluids, warm salt water gargles, throat lozenges, tea, honey, soup, rest, hydration.      Tylenol and ibuprofen        Please return or see your primary care doctor if you develop new or worsening symptoms.     Please arrange follow up with your primary medical clinic as soon as possible. You must understand that you've received an Urgent Care treatment only and that you may be released before all of your medical problems are known or treated. You, the patient, will arrange for follow up as instructed. If your symptoms worsen or fail to improve you should go to the Emergency Room.    Pharyngitis (Sore Throat), Report Pending    Pharyngitis (sore throat) is often due to a virus. It can also be caused by the streptococcus, or strep, bacterium, often called strep throat. Both viral and strep infections can cause throat pain that is worse when swallowing, aching all over with headache, and fever. Both types of infections are contagious. They may be spread by coughing, kissing, or touching others after touching your mouth or nose.  A test has been done to find out whether you (or your child, if your child is the patient) have strep throat. Call this facility or your healthcare provider if you were not given your test results. If the test is positive for strep infection, you will need to take antibiotic medicines. A prescription can be called into your pharmacy at that time. If the test is negative, you probably have a viral pharyngitis. This does not need to be treated with antibiotics. Until you receive the results of the strep test, you should stay home from work. If your child is being tested, he or she should stay home from school.  Home care  · Rest at home. Drink  plenty of fluids so you won't get dehydrated.  · If the test is positive for strep, don't go to work or school for the first 2 days of taking the antibiotics. After this time, you will not be contagious. You can then return to work or school if you are feeling better.   · Take the antibiotic medicine for the full 10 days, even if you feel better. This is very important to make sure the infection is treated. It is also important to prevent drug-resistant germs from developing. If you were given an antibiotic shot, you won't need more antibiotics.  · For children: Use acetaminophen for fever, fussiness, or discomfort. In infants older than 6 months of age, you may use ibuprofen instead of acetaminophen. Talk with your child's healthcare provider before giving these medicines if your child has chronic liver or kidney disease or ever had a stomach ulcer or GI bleeding. Never give aspirin to a child under 18 years of age who is ill with a fever. It may cause severe liver damage.  · For adults: Use acetaminophen or ibuprofen to control pain or fever, unless another medicine was prescribed for this. Talk with your healthcare provider before taking these medicines if you have chronic liver or kidney disease or ever had a stomach ulcer or GI bleeding.  · Use throat lozenges or numbing throat sprays to help reduce pain. Gargling with warm salt water will also help reduce throat pain. For this, dissolve 1/2 teaspoon of salt in 1 glass of warm water. To help soothe a sore throat, children can sip on juice or a popsicle. Children 5 years and older can also suck on a lollipop or hard candy.  · Don't eat salty or spicy foods. These can irritate the throat.  Follow-up care  Follow up with your healthcare provider or our staff if you don't get better over the next week.  When to seek medical advice  Call your healthcare provider right away if any of these occur:  · Fever as directed by your healthcare provider. For children, seek  care if:  ¨ Your child is of any age and has repeated fevers above 104°F (40°C).  ¨ Your child is younger than 2 years of age and has a fever of 100.4°F (38°C) that continues for more than 1 day.  ¨ Your child is 2 years old or older and has a fever of 100.4°F (38°C) that continues for more than 3 days.  · New or worsening ear pain, sinus pain, or headache  · Painful lumps in the back of neck  · Stiff neck  · Lymph nodes are getting larger  · Inability to swallow liquids, excessive drooling, or inability to open mouth wide due to throat pain  · Signs of dehydration (very dark urine or no urine, sunken eyes, dizziness)  · Trouble breathing or noisy breathing  · Muffled voice  · New rash  · Child appears to be getting sicker  Date Last Reviewed: 4/13/2015  © 8894-7697 [employer]. [address] [place] [address], [place] [address]. All rights reserved. This information is not intended as a substitute for professional medical care. Always follow your healthcare professional's instructions.

## 2019-12-30 NOTE — PROGRESS NOTES
"Subjective:       Patient ID: Pastora Cota is a 54 y.o. female.    Vitals:  height is 5' 5" (1.651 m) and weight is 75.8 kg (167 lb). Her oral temperature is 98.6 °F (37 °C). Her blood pressure is 147/91 (abnormal) and her pulse is 84. Her respiration is 16 and oxygen saturation is 97%.     Chief Complaint: Sore Throat    Patient reports exposure to strep by niece.Patient reports sore throat started yesterday     Sore Throat    This is a new problem. The current episode started yesterday. The problem has been gradually worsening. Neither side of throat is experiencing more pain than the other. There has been no fever. The pain is at a severity of 10/10. Associated symptoms include congestion and headaches. Pertinent negatives include no coughing, ear pain, shortness of breath, stridor, trouble swallowing or vomiting. She has had exposure to strep. She has had no exposure to mono. She has tried gargles and oral narcotic analgesics for the symptoms. The treatment provided no relief.       Constitution: Negative for chills, sweating, fatigue and fever.   HENT: Positive for congestion and sore throat. Negative for ear pain, sinus pain, sinus pressure, trouble swallowing and voice change.    Neck: Negative for painful lymph nodes.   Eyes: Negative for eye redness.   Respiratory: Negative for chest tightness, cough, sputum production, bloody sputum, COPD, shortness of breath, stridor, wheezing and asthma.    Gastrointestinal: Negative for nausea and vomiting.   Musculoskeletal: Negative for muscle ache.   Skin: Negative for rash.   Allergic/Immunologic: Negative for seasonal allergies and asthma.   Neurological: Positive for headaches.   Hematologic/Lymphatic: Negative for swollen lymph nodes.       Objective:      Physical Exam   Constitutional: She is oriented to person, place, and time. She appears well-developed and well-nourished. She is cooperative.  Non-toxic appearance. She does not have a sickly " appearance. She does not appear ill. No distress.   HENT:   Head: Normocephalic and atraumatic.   Right Ear: Hearing, tympanic membrane, external ear and ear canal normal.   Left Ear: Hearing, tympanic membrane, external ear and ear canal normal.   Nose: Nose normal. No mucosal edema, rhinorrhea or nasal deformity. No epistaxis. Right sinus exhibits no maxillary sinus tenderness and no frontal sinus tenderness. Left sinus exhibits no maxillary sinus tenderness and no frontal sinus tenderness.   Mouth/Throat: Uvula is midline and mucous membranes are normal. No trismus in the jaw. Normal dentition. No uvula swelling. Posterior oropharyngeal erythema (mild) present. No oropharyngeal exudate or posterior oropharyngeal edema.   Eyes: Conjunctivae and lids are normal. No scleral icterus.   Neck: Trachea normal, full passive range of motion without pain and phonation normal. Neck supple. No neck rigidity. No edema and no erythema present.   Cardiovascular: Normal rate, regular rhythm, normal heart sounds, intact distal pulses and normal pulses.   Pulmonary/Chest: Effort normal and breath sounds normal. No respiratory distress. She has no decreased breath sounds. She has no wheezes. She has no rhonchi.   Abdominal: Normal appearance.   Musculoskeletal: Normal range of motion. She exhibits no edema or deformity.   Lymphadenopathy:     She has no cervical adenopathy.   Neurological: She is alert and oriented to person, place, and time. She exhibits normal muscle tone. Coordination normal.   Skin: Skin is warm, dry, intact, not diaphoretic and not pale.   Psychiatric: She has a normal mood and affect. Her speech is normal and behavior is normal. Judgment and thought content normal. Cognition and memory are normal.   Nursing note and vitals reviewed.    Results for orders placed or performed in visit on 12/30/19   POCT rapid strep A   Result Value Ref Range    Rapid Strep A Screen Negative Negative      Acceptable Yes            Assessment:       1. Pharyngitis, unspecified etiology    2. Exposure to strep throat        Plan:         Pharyngitis, unspecified etiology  -     POCT rapid strep A    Exposure to strep throat  -     Strep A culture, throat    sx treatment until culture results    Patient Instructions   PLEASE READ YOUR DISCHARGE INSTRUCTIONS ENTIRELY AS IT CONTAINS IMPORTANT INFORMATION.    A culture of your throat was sent. You will be contacted once it results in about 3 days and appropriate action will be taken.     Sore throat recommendations: Warm fluids, warm salt water gargles, throat lozenges, tea, honey, soup, rest, hydration.      Tylenol and ibuprofen        Please return or see your primary care doctor if you develop new or worsening symptoms.     Please arrange follow up with your primary medical clinic as soon as possible. You must understand that you've received an Urgent Care treatment only and that you may be released before all of your medical problems are known or treated. You, the patient, will arrange for follow up as instructed. If your symptoms worsen or fail to improve you should go to the Emergency Room.    Pharyngitis (Sore Throat), Report Pending    Pharyngitis (sore throat) is often due to a virus. It can also be caused by the streptococcus, or strep, bacterium, often called strep throat. Both viral and strep infections can cause throat pain that is worse when swallowing, aching all over with headache, and fever. Both types of infections are contagious. They may be spread by coughing, kissing, or touching others after touching your mouth or nose.  A test has been done to find out whether you (or your child, if your child is the patient) have strep throat. Call this facility or your healthcare provider if you were not given your test results. If the test is positive for strep infection, you will need to take antibiotic medicines. A prescription can be called into your pharmacy  at that time. If the test is negative, you probably have a viral pharyngitis. This does not need to be treated with antibiotics. Until you receive the results of the strep test, you should stay home from work. If your child is being tested, he or she should stay home from school.  Home care  · Rest at home. Drink plenty of fluids so you won't get dehydrated.  · If the test is positive for strep, don't go to work or school for the first 2 days of taking the antibiotics. After this time, you will not be contagious. You can then return to work or school if you are feeling better.   · Take the antibiotic medicine for the full 10 days, even if you feel better. This is very important to make sure the infection is treated. It is also important to prevent drug-resistant germs from developing. If you were given an antibiotic shot, you won't need more antibiotics.  · For children: Use acetaminophen for fever, fussiness, or discomfort. In infants older than 6 months of age, you may use ibuprofen instead of acetaminophen. Talk with your child's healthcare provider before giving these medicines if your child has chronic liver or kidney disease or ever had a stomach ulcer or GI bleeding. Never give aspirin to a child under 18 years of age who is ill with a fever. It may cause severe liver damage.  · For adults: Use acetaminophen or ibuprofen to control pain or fever, unless another medicine was prescribed for this. Talk with your healthcare provider before taking these medicines if you have chronic liver or kidney disease or ever had a stomach ulcer or GI bleeding.  · Use throat lozenges or numbing throat sprays to help reduce pain. Gargling with warm salt water will also help reduce throat pain. For this, dissolve 1/2 teaspoon of salt in 1 glass of warm water. To help soothe a sore throat, children can sip on juice or a popsicle. Children 5 years and older can also suck on a lollipop or hard candy.  · Don't eat salty or spicy  foods. These can irritate the throat.  Follow-up care  Follow up with your healthcare provider or our staff if you don't get better over the next week.  When to seek medical advice  Call your healthcare provider right away if any of these occur:  · Fever as directed by your healthcare provider. For children, seek care if:  ¨ Your child is of any age and has repeated fevers above 104°F (40°C).  ¨ Your child is younger than 2 years of age and has a fever of 100.4°F (38°C) that continues for more than 1 day.  ¨ Your child is 2 years old or older and has a fever of 100.4°F (38°C) that continues for more than 3 days.  · New or worsening ear pain, sinus pain, or headache  · Painful lumps in the back of neck  · Stiff neck  · Lymph nodes are getting larger  · Inability to swallow liquids, excessive drooling, or inability to open mouth wide due to throat pain  · Signs of dehydration (very dark urine or no urine, sunken eyes, dizziness)  · Trouble breathing or noisy breathing  · Muffled voice  · New rash  · Child appears to be getting sicker  Date Last Reviewed: 4/13/2015  © 7501-2127 [email or web address]. [address] [place] [address], [place] [address]. All rights reserved. This information is not intended as a substitute for professional medical care. Always follow your healthcare professional's instructions.

## 2020-01-02 LAB — BACTERIA THROAT CULT: NORMAL

## 2020-01-07 ENCOUNTER — PATIENT OUTREACH (OUTPATIENT)
Dept: ADMINISTRATIVE | Facility: OTHER | Age: 55
End: 2020-01-07

## 2020-01-08 ENCOUNTER — OFFICE VISIT (OUTPATIENT)
Dept: OBSTETRICS AND GYNECOLOGY | Facility: CLINIC | Age: 55
End: 2020-01-08
Attending: OBSTETRICS & GYNECOLOGY
Payer: COMMERCIAL

## 2020-01-08 VITALS
WEIGHT: 170 LBS | SYSTOLIC BLOOD PRESSURE: 104 MMHG | HEIGHT: 64 IN | BODY MASS INDEX: 29.02 KG/M2 | DIASTOLIC BLOOD PRESSURE: 68 MMHG

## 2020-01-08 DIAGNOSIS — Z01.419 ENCOUNTER FOR GYNECOLOGICAL EXAMINATION WITHOUT ABNORMAL FINDING: Primary | ICD-10-CM

## 2020-01-08 PROCEDURE — 99396 PREV VISIT EST AGE 40-64: CPT | Mod: S$GLB,,, | Performed by: OBSTETRICS & GYNECOLOGY

## 2020-01-08 PROCEDURE — 3078F PR MOST RECENT DIASTOLIC BLOOD PRESSURE < 80 MM HG: ICD-10-PCS | Mod: CPTII,S$GLB,, | Performed by: OBSTETRICS & GYNECOLOGY

## 2020-01-08 PROCEDURE — 3078F DIAST BP <80 MM HG: CPT | Mod: CPTII,S$GLB,, | Performed by: OBSTETRICS & GYNECOLOGY

## 2020-01-08 PROCEDURE — 3074F PR MOST RECENT SYSTOLIC BLOOD PRESSURE < 130 MM HG: ICD-10-PCS | Mod: CPTII,S$GLB,, | Performed by: OBSTETRICS & GYNECOLOGY

## 2020-01-08 PROCEDURE — 3074F SYST BP LT 130 MM HG: CPT | Mod: CPTII,S$GLB,, | Performed by: OBSTETRICS & GYNECOLOGY

## 2020-01-08 PROCEDURE — 99396 PR PREVENTIVE VISIT,EST,40-64: ICD-10-PCS | Mod: S$GLB,,, | Performed by: OBSTETRICS & GYNECOLOGY

## 2020-01-08 RX ORDER — TIZANIDINE 4 MG/1
TABLET ORAL
COMMUNITY
Start: 2020-01-02 | End: 2020-06-15

## 2020-01-08 RX ORDER — DOXYCYCLINE HYCLATE 100 MG
TABLET ORAL
COMMUNITY
Start: 2020-01-02 | End: 2020-06-15

## 2020-01-08 NOTE — PROGRESS NOTES
SUBJECTIVE:   54 y.o. female   for annual routine  checkup. No LMP recorded. Patient has had a hysterectomy..  She recently had a parathyroidectomy- she is being followed by Endocrinology.  She reports occasional hot flashes, no vaginal dryness.        Past Medical History:   Diagnosis Date    Abnormal Pap smear     repeat normal    Abnormal Pap smear of cervix     Abnormal Pap smear of vagina     Allergy     seasonal    AR (allergic rhinitis)     Hypercalcemia 9/10/2015    Hypertension     IGT (impaired glucose tolerance) 2014    Pneumonia 2016     Past Surgical History:   Procedure Laterality Date    ABDOMINAL SURGERY      CHOLECYSTECTOMY          COLONOSCOPY N/A 2016    Procedure: COLONOSCOPY;  Surgeon: Johnny Clayton MD;  Location: [facility] ([address] [place] [address]);  Service: Endoscopy;  Laterality: N/A;    COLONOSCOPY N/A 2017    Procedure: COLONOSCOPY;  Surgeon: Johnny Clayton MD;  [facility]  Service: Endoscopy;  Laterality: N/A;    COLPOSCOPY      DILATION AND CURETTAGE OF UTERUS      HYSTERECTOMY       tahbso     OOPHORECTOMY      PARATHYROIDECTOMY Right 2019    Procedure: PARATHYROIDECTOMY-with intra-op iPTH levels possible subtotal;  Surgeon: Kiko Wilson MD;  Location: [facility] OR [address] [place] [address];  Service: General;  Laterality: Right;     Social History     Socioeconomic History    Marital status:      Spouse name: Not on file    Number of children: Not on file    Years of education: Not on file    Highest education level: Not on file   Occupational History    Occupation:      Employer: [employer]    Social Needs    Financial resource strain: Not very hard    Food insecurity:     Worry: Never true     Inability: Never true    Transportation needs:     Medical: Not on file     Non-medical: No   Tobacco Use    Smoking status: Never Smoker    Smokeless tobacco: Never Used   Substance and Sexual Activity     Alcohol use: Yes     Alcohol/week: 0.0 standard drinks     Types: 1 Standard drinks or equivalent per week     Frequency: Monthly or less     Drinks per session: 1 or 2     Binge frequency: Never     Comment: socailly    Drug use: No    Sexual activity: Yes     Partners: Male     Birth control/protection: None, See Surgical Hx     Comment: ; hysterectomy   Lifestyle    Physical activity:     Days per week: 0 days     Minutes per session: 0 min    Stress: To some extent   Relationships    Social connections:     Talks on phone: Not on file     Gets together: Once a week     Attends Sabianist service: Not on file     Active member of club or organization: No     Attends meetings of clubs or organizations: Not on file     Relationship status:    Other Topics Concern    Are you pregnant or think you may be? Not Asked    Breast-feeding Not Asked   Social History Narrative    No regular exercise     with 1 child     Family History   Problem Relation Age of Onset    Hypertension Mother     Diabetes Mother     Glaucoma Mother     Hypertension Sister         four sisters    Breast cancer Sister 44    Glaucoma Father     No Known Problems Brother     No Known Problems Maternal Aunt     No Known Problems Maternal Uncle     No Known Problems Paternal Aunt     No Known Problems Paternal Uncle     No Known Problems Maternal Grandmother     No Known Problems Maternal Grandfather     No Known Problems Paternal Grandmother     No Known Problems Paternal Grandfather     Thyroid cancer Neg Hx     Cancer Neg Hx     Colon cancer Neg Hx     Ovarian cancer Neg Hx     Melanoma Neg Hx     Psoriasis Neg Hx     Lupus Neg Hx     Amblyopia Neg Hx     Blindness Neg Hx     Cataracts Neg Hx     Macular degeneration Neg Hx     Retinal detachment Neg Hx     Strabismus Neg Hx     Stroke Neg Hx     Thyroid disease Neg Hx     Esophageal cancer Neg Hx      OB History    Para Term   AB Living   3 2 2   1     SAB TAB Ectopic Multiple Live Births   1              # Outcome Date GA Lbr Leonardo/2nd Weight Sex Delivery Anes PTL Lv   3 Term            2 Term            1 SAB                    Current Outpatient Medications   Medication Sig Dispense Refill    acetaminophen (TYLENOL) 500 MG tablet Take 2 tablets (1,000 mg total) by mouth every 6 (six) hours as needed. (Patient not taking: Reported on 12/30/2019)  0    amLODIPine (NORVASC) 10 MG tablet Take 1 tablet (10 mg total) by mouth once daily. 90 tablet 3    cholecalciferol, vitamin D3, 50,000 unit capsule Take 1 capsule (50,000 Units total) by mouth once a week. 12 capsule 3    doxycycline (VIBRA-TABS) 100 MG tablet TAKE 1 TABLET BY MOUTH TWICE DAILY AFTER A MEAL      furosemide (LASIX) 20 MG tablet Take 1 tablet (20 mg total) by mouth once daily. 90 tablet 3    hydrOXYzine HCl (ATARAX) 25 MG tablet Take 1 tablet (25 mg total) by mouth 3 (three) times daily. (Patient taking differently: Take 25 mg by mouth 3 (three) times daily as needed. ) 45 tablet 1    losartan (COZAAR) 100 MG tablet Take 1 tablet (100 mg total) by mouth every evening. (Patient not taking: Reported on 12/30/2019) 90 tablet 3    metoprolol tartrate (LOPRESSOR) 25 MG tablet Take 0.5 tablets (12.5 mg total) by mouth 2 (two) times daily. 90 tablet 2    spironolactone (ALDACTONE) 25 MG tablet Take 1 tablet (25 mg total) by mouth once daily. (Patient taking differently: Take 25 mg by mouth every evening. ) 90 tablet 2    VIRTUSSIN AC  mg/5 mL syrup TK 5 TO 10 ML PO Q 6 H PRN COU       No current facility-administered medications for this visit.      Allergies: Sulfa (sulfonamide antibiotics)     The 10-year ASCVD risk score (Lebanonbaron MCCLELLAN Jr., et al., 2013) is: 2.5%    Values used to calculate the score:      Age: 54 years      Sex: Female      Is Non- : Yes      Diabetic: No      Tobacco smoker: No      Systolic Blood Pressure: 104 mmHg      Is BP  treated: Yes      HDL Cholesterol: 38 mg/dL      Total Cholesterol: 130 mg/dL      ROS:  Constitutional: no weight loss, weight gain, fever, fatigue  Eyes:  No vision changes, glasses/contacts  ENT/Mouth: No ulcers, sinus problems, ears ringing, headache  Cardiovascular: No inability to lie flat, chest pain, exercise intolerance, swelling, heart palpitations  Respiratory: No wheezing, coughing blood, shortness of breath, or cough  Gastrointestinal: No diarrhea, bloody stool, nausea/vomiting, constipation, gas, hemorrhoids  Genitourinary: No blood in urine, painful urination, urgency of urination, frequency of urination, incomplete emptying, incontinence, abnormal bleeding, painful periods, heavy periods, vaginal discharge, vaginal odor, painful intercourse, sexual problems, bleeding after intercourse.  Musculoskeletal: No muscle weakness  Skin/Breast: No painful breasts, nipple discharge, masses, rash, ulcers  Neurological: No passing out, seizures, numbness, headache  Endocrine: No diabetes, hypothyroid, hyperthyroid, hot flashes, hair loss, abnormal hair growth, acne, +osteoporosis  Psychiatric: No depression, crying  Hematologic: No bruises, bleeding, swollen lymph nodes, anemia.      Physical Exam:   Constitutional: She is oriented to person, place, and time. She appears well-developed and well-nourished.      Neck: Normal range of motion. No tracheal deviation present. No thyromegaly present.    Cardiovascular: Exam reveals no edema.     Pulmonary/Chest: Effort normal. She exhibits no mass, no tenderness, no deformity and no retraction. Right breast exhibits no inverted nipple, no mass, no nipple discharge, no skin change, no tenderness, presence, no bleeding and no swelling. Left breast exhibits no inverted nipple, no mass, no nipple discharge, no skin change, no tenderness, presence, no bleeding and no swelling. Breasts are symmetrical.        Abdominal: Soft. She exhibits no distension and no mass. There is  no tenderness. There is no rebound and no guarding. No hernia. Hernia confirmed negative in the left inguinal area.     Genitourinary: Rectal exam shows no external hemorrhoid. There is no rash, tenderness or lesion on the right labia. There is no rash, tenderness or lesion on the left labia. Uterus is absent. No no adexnal prolapse. Right adnexum displays no mass, no tenderness and no fullness. Left adnexum displays no mass, no tenderness and no fullness. No tenderness, bleeding, rectocele, cystocele or unspecified prolapse of vaginal walls in the vagina. No vaginal discharge found. Vaginal cuff normal.Cervix exhibits absence.           Musculoskeletal: Normal range of motion and moves all extremeties. She exhibits no edema.      Lymphadenopathy:        Right: No inguinal adenopathy present.        Left: No inguinal adenopathy present.    Neurological: She is alert and oriented to person, place, and time.    Skin: No rash noted. No erythema. No pallor.    Psychiatric: She has a normal mood and affect. Her behavior is normal. Judgment and thought content normal.         ASSESSMENT:   well woman    PLAN:   mammogram  return annually or prn

## 2020-02-13 ENCOUNTER — PATIENT MESSAGE (OUTPATIENT)
Dept: ADMINISTRATIVE | Facility: OTHER | Age: 55
End: 2020-02-13

## 2020-02-27 DIAGNOSIS — I10 ESSENTIAL HYPERTENSION: Primary | ICD-10-CM

## 2020-02-28 RX ORDER — SPIRONOLACTONE 25 MG/1
25 TABLET ORAL DAILY
Qty: 90 TABLET | Refills: 3 | Status: SHIPPED | OUTPATIENT
Start: 2020-02-28 | End: 2021-03-09 | Stop reason: SDUPTHER

## 2020-03-09 ENCOUNTER — PATIENT OUTREACH (OUTPATIENT)
Dept: ADMINISTRATIVE | Facility: OTHER | Age: 55
End: 2020-03-09

## 2020-03-10 ENCOUNTER — TELEPHONE (OUTPATIENT)
Dept: OPTOMETRY | Facility: CLINIC | Age: 55
End: 2020-03-10

## 2020-03-10 NOTE — TELEPHONE ENCOUNTER
Giovana العلي ins benefits as of 3/10/20:    Member: SID GARCIA  YOB: 1965  Subscriber ID: [id number]  Product Name: ///  Plan Code: E1  Please Note: Member must be eligible at date of service to receive benefit.  In Network Coverage Frequency  Category Benefit Eligibility Frequency  Exam Available 1 every 12 month(s)  Maternity Exam Available 2 every 12 month(s)  Selection Contact Lens Fit Available 1 every 12 month(s)  Non-Selection Contact Lens Fit Available 1 every 12 month(s)  Frame Available 1 every 24 month(s)  Lenses Available 1 every 12 month(s)  Selection Contact Lenses - Daily Wearï Available Every 12 month(s)  Selection Contact Lenses - Monthly Wearï Available Every 12 month(s)  Non-Selection Contact Lensesï Available Every 12 month(s)  Maternity Replacement Frames Available on 05/09/2020 1 every 24 month(s)  Maternity Replacement All Lenses Available on 05/09/2020 1 every 12 month(s)   Dilated Fundus Exam: Patient History Currently Unavailable  ï Contact Lenses are in Lieu of Eyeglasses  In Network Coverage  [facility] Patient Responsibility  (includes applicable copay)  Professional Services  Exam $10.00  Maternity Exam $10.00  Non-Selection Contact Lens Fit 100% of Billed Charges  Selection Contact Lens Fit Covered-in-Full

## 2020-03-15 ENCOUNTER — PATIENT OUTREACH (OUTPATIENT)
Dept: ADMINISTRATIVE | Facility: OTHER | Age: 55
End: 2020-03-15

## 2020-03-16 NOTE — PROGRESS NOTES
Chart reviewed.   Immunizations: Triggered Imm Registry     Orders placed: n/a  Upcoming appts to satisfy CESAR topics: n/a

## 2020-04-22 ENCOUNTER — TELEPHONE (OUTPATIENT)
Dept: OPTOMETRY | Facility: CLINIC | Age: 55
End: 2020-04-22

## 2020-04-22 NOTE — TELEPHONE ENCOUNTER
Rescheduling routine and non-emergency visit until next month   Person spoke with ?Pastora Cota  Rescheduled 05/18 @3pm B[facility]   Spoke with ? Message? S/w   Mychart message? N/a

## 2020-05-07 ENCOUNTER — TELEPHONE (OUTPATIENT)
Dept: OPTOMETRY | Facility: CLINIC | Age: 55
End: 2020-05-07

## 2020-06-11 ENCOUNTER — PATIENT MESSAGE (OUTPATIENT)
Dept: ENDOCRINOLOGY | Facility: CLINIC | Age: 55
End: 2020-06-11

## 2020-06-16 ENCOUNTER — OFFICE VISIT (OUTPATIENT)
Dept: ENDOCRINOLOGY | Facility: CLINIC | Age: 55
End: 2020-06-16
Payer: COMMERCIAL

## 2020-06-16 DIAGNOSIS — I10 ESSENTIAL HYPERTENSION: ICD-10-CM

## 2020-06-16 DIAGNOSIS — M81.0 OSTEOPOROSIS, UNSPECIFIED OSTEOPOROSIS TYPE, UNSPECIFIED PATHOLOGICAL FRACTURE PRESENCE: ICD-10-CM

## 2020-06-16 DIAGNOSIS — R73.02 IGT (IMPAIRED GLUCOSE TOLERANCE): ICD-10-CM

## 2020-06-16 DIAGNOSIS — E04.2 MULTINODULAR GOITER: ICD-10-CM

## 2020-06-16 DIAGNOSIS — E21.0 PRIMARY HYPERPARATHYROIDISM: Primary | ICD-10-CM

## 2020-06-16 PROCEDURE — 99214 PR OFFICE/OUTPT VISIT, EST, LEVL IV, 30-39 MIN: ICD-10-PCS | Mod: 95,,, | Performed by: INTERNAL MEDICINE

## 2020-06-16 PROCEDURE — 99214 OFFICE O/P EST MOD 30 MIN: CPT | Mod: 95,,, | Performed by: INTERNAL MEDICINE

## 2020-06-16 NOTE — PROGRESS NOTES
Subjective:      Patient ID: Pastora Cota is a 54 y.o. female.    Chief Complaint:  Primary hyperparathyroidism      History of Present Illness  With regards to Primary hyperparathyroidism     She has episodic hypercalcemia     She takes 1200 mg calcium daily plus 3-4 dairy servings.  She is  off HCTZ.  Denies constipation, bone pain, weakness.       s/p right inferior parathyroidectomy on 12/11/19 for primary hyperparathyroidism - Dr Wilson   No s/s of hypocalcemia       With regards to the Osteoporosis     dx on bmd 4/25/19  Impression       Osteoporosis of the distal 1/3 forearm      noted that there also has been l spine ahd hip bone loss when compared to the last study     No recent falls or fractures     With regards to the MNG    In 2011 she presented with palpitations   She had a ct of the chest  The goiter was noted at that time  She had an u/s done and was advised to f/u     Last thyroid ultrasound 4/25/19    COMPARISON:  Neck ultrasound dated 9/2/2015.  When compared to the prior study an additional nodule is now noted in the left inferior pole.      Impression       1.)  Thyroid gland is normal in size with homogeneous echotexture and normal vascularity    2.) 0.68 x 0.49 x 0.63 cm solid, heterogeneous predominately hypoechoic nodule seen in the right inferior pole    3.) 0.93 x 0.48 x 0.66 cm hypoechoic lesion seen posterior the mid lobe of the thyroid which could represent a parathyroid adenoma    4.) 1.50 x 0.59 x 1.07 cm colloid-containing cyst seen in the left superior pole    5.) 1.32 x 0.78 x 1.09 cm solid, heterogeneous predominately hypoechoic nodule seen in the left inferior pole    RECOMMENDATIONS:  Recommend FNA of left inferior pole nodule.      fna done 6/5/19  THYROID, LEFT INFERIOR NODULE, FINE-NEEDLE ASPIRATION:  Satisfactory for interpretation  [place] [facility] thyroid cytology category: Benign  Benign follicular epithelial cells, abundant macrophages and watery colloid  consistent with benign colloid nodule  with cystic degeneration    she does not notice the goiter  No voice changes, no difficulty swallowing or breathing  no FH of thyroid disease or disease   no h/o radiation treatment or exposure  Did have light therapy for acne           With regards to   igt        Ref. Range 8/15/2018 15:15 3/7/2019 09:39   Hemoglobin A1C External Latest Ref Range: 4.0 - 5.6 % 5.7 (H) 5.8 (H)     Denies symptoms of hyperglycemia such as polyuria, polydipsia, nocturia, unexplained weight loss or blurred vision  Gained a few lbs with covid          Review of Systems   Constitutional: Negative for unexpected weight change.   Eyes: Negative for visual disturbance.   Respiratory: Negative for shortness of breath.    Cardiovascular: Negative for chest pain.   Gastrointestinal: Negative for abdominal pain.   Musculoskeletal: Negative for arthralgias.   Skin: Negative for wound.   Neurological: Negative for headaches.   Hematological: Does not bruise/bleed easily.   Psychiatric/Behavioral: Negative for sleep disturbance.       Objective:   Physical Exam    There is no height or weight on file to calculate BMI.    Lab Review:   Lab Results   Component Value Date    HGBA1C 5.5 04/07/2019     Lab Results   Component Value Date    CHOL 130 04/07/2019    HDL 38 (L) 04/07/2019    LDLCALC 85.6 04/07/2019    TRIG 32 04/07/2019    CHOLHDL 29.2 04/07/2019     Lab Results   Component Value Date     11/21/2019    K 4.1 11/21/2019     11/21/2019    CO2 26 11/21/2019    GLU 85 11/21/2019    BUN 19 11/21/2019    CREATININE 0.9 11/21/2019    CALCIUM 10.8 (H) 11/21/2019    PROT 8.1 11/21/2019    ALBUMIN 3.8 11/21/2019    BILITOT 0.2 11/21/2019    ALKPHOS 104 11/21/2019    AST 16 11/21/2019    ALT 18 11/21/2019    ANIONGAP 7 (L) 11/21/2019    ESTGFRAFRICA >60.0 11/21/2019    EGFRNONAA >60.0 11/21/2019    TSH 0.906 07/05/2019       Assessment and Plan     Primary hyperparathyroidism  S/p Surgery      Recheck  labs and bone density      Osteoporosis  Anticipate improvement after parathyroidectomy.  Repeat bone density    Essential hypertension  Follow at home       Multinodular goiter  Follow      Discussed indications for a repeat FNA      Discussed  surgical indications (abnormal FNA, compressive symptoms or interval change)          IGT (impaired glucose tolerance)    alerted as to the increased risk for t2DM  Stressed diet and exercise   Periodic hba1c levels        The patient location is: home  The chief complaint leading to consultation is: above    Visit type: audiovisual    Face to Face time with patient: 30 minutes of total time spent on the encounter, which includes face to face time and non-face to face time preparing to see the patient (eg, review of tests), Obtaining and/or reviewing separately obtained history, Documenting clinical information in the electronic or other health record, Independently interpreting results (not separately reported) and communicating results to the patient/family/caregiver, or Care coordination (not separately reported).         Each patient to whom he or she provides medical services by telemedicine is:  (1) informed of the relationship between the physician and patient and the respective role of any other health care provider with respect to management of the patient; and (2) notified that he or she may decline to receive medical services by telemedicine and may withdraw from such care at any time.

## 2020-06-16 NOTE — PATIENT INSTRUCTIONS
Thank you for completing a virtual visit with me!     Per our conversation we will plan blood work, a thyroid ultrasound, and a bone density (if insurance approves it).    My staff will contact you to schedule the above.     Please let me know if you have any other questions.    Thank you,  Valerie Kaba MD

## 2020-06-16 NOTE — ASSESSMENT & PLAN NOTE
Follow      Discussed indications for a repeat FNA      Discussed  surgical indications (abnormal FNA, compressive symptoms or interval change)

## 2020-07-03 DIAGNOSIS — I10 ESSENTIAL HYPERTENSION: ICD-10-CM

## 2020-07-03 RX ORDER — LOSARTAN POTASSIUM 100 MG/1
100 TABLET ORAL NIGHTLY
Qty: 90 TABLET | Refills: 3 | Status: CANCELLED | OUTPATIENT
Start: 2020-07-03 | End: 2021-07-03

## 2020-07-06 RX ORDER — METOPROLOL TARTRATE 25 MG/1
12.5 TABLET, FILM COATED ORAL 2 TIMES DAILY
Qty: 90 TABLET | Refills: 2 | Status: SHIPPED | OUTPATIENT
Start: 2020-07-06 | End: 2021-03-09 | Stop reason: SDUPTHER

## 2020-07-07 DIAGNOSIS — I10 ESSENTIAL HYPERTENSION: ICD-10-CM

## 2020-07-07 RX ORDER — LOSARTAN POTASSIUM 100 MG/1
100 TABLET ORAL NIGHTLY
Qty: 90 TABLET | Refills: 3 | Status: SHIPPED | OUTPATIENT
Start: 2020-07-07 | End: 2021-06-13

## 2020-07-07 NOTE — TELEPHONE ENCOUNTER
----- Message from Christopher Mcbride sent at 7/7/2020  3:03 PM CDT -----  Regarding: Refill  Contact: Patient  Type: Patient Call Back    Who called:Patient    What is the request in detail: Patient is requesting a call back to discuss losartan.     Can the clinic reply by [facility]? No    Would the patient rather a call back or a response via [facility]? Call    Best call back number:[phone]    Additional Information:n/a

## 2020-07-08 ENCOUNTER — OFFICE VISIT (OUTPATIENT)
Dept: URGENT CARE | Facility: CLINIC | Age: 55
End: 2020-07-08
Payer: COMMERCIAL

## 2020-07-08 VITALS
WEIGHT: 169 LBS | OXYGEN SATURATION: 97 % | SYSTOLIC BLOOD PRESSURE: 165 MMHG | DIASTOLIC BLOOD PRESSURE: 92 MMHG | HEIGHT: 64 IN | HEART RATE: 84 BPM | BODY MASS INDEX: 28.85 KG/M2 | RESPIRATION RATE: 18 BRPM | TEMPERATURE: 98 F

## 2020-07-08 DIAGNOSIS — K52.9 GASTROENTERITIS: Primary | ICD-10-CM

## 2020-07-08 LAB
BILIRUB UR QL STRIP: NEGATIVE
GLUCOSE UR QL STRIP: NEGATIVE
KETONES UR QL STRIP: NEGATIVE
LEUKOCYTE ESTERASE UR QL STRIP: NEGATIVE
PH, POC UA: 7
POC BLOOD, URINE: NEGATIVE
POC NITRATES, URINE: NEGATIVE
PROT UR QL STRIP: NEGATIVE
SP GR UR STRIP: 1.01 (ref 1–1.03)
UROBILINOGEN UR STRIP-ACNC: NORMAL (ref 0.1–1.1)

## 2020-07-08 PROCEDURE — 81003 URINALYSIS AUTO W/O SCOPE: CPT | Mod: QW,S$GLB,, | Performed by: FAMILY MEDICINE

## 2020-07-08 PROCEDURE — 81003 POCT URINALYSIS, DIPSTICK, AUTOMATED, W/O SCOPE: ICD-10-PCS | Mod: QW,S$GLB,, | Performed by: FAMILY MEDICINE

## 2020-07-08 PROCEDURE — 99214 OFFICE O/P EST MOD 30 MIN: CPT | Mod: 25,S$GLB,, | Performed by: FAMILY MEDICINE

## 2020-07-08 PROCEDURE — 74019 XR ABDOMEN FLAT AND ERECT: ICD-10-PCS | Mod: S$GLB,,, | Performed by: RADIOLOGY

## 2020-07-08 PROCEDURE — 99214 PR OFFICE/OUTPT VISIT, EST, LEVL IV, 30-39 MIN: ICD-10-PCS | Mod: 25,S$GLB,, | Performed by: FAMILY MEDICINE

## 2020-07-08 PROCEDURE — 74019 RADEX ABDOMEN 2 VIEWS: CPT | Mod: S$GLB,,, | Performed by: RADIOLOGY

## 2020-07-08 RX ORDER — DICYCLOMINE HYDROCHLORIDE 10 MG/1
10 CAPSULE ORAL EVERY 6 HOURS PRN
Qty: 20 CAPSULE | Refills: 0 | Status: SHIPPED | OUTPATIENT
Start: 2020-07-08 | End: 2021-02-12

## 2020-07-08 RX ORDER — DICYCLOMINE HYDROCHLORIDE 20 MG/1
20 TABLET ORAL
Status: COMPLETED | OUTPATIENT
Start: 2020-07-08 | End: 2020-07-08

## 2020-07-08 RX ADMIN — DICYCLOMINE HYDROCHLORIDE 20 MG: 20 TABLET ORAL at 08:07

## 2020-07-09 NOTE — PATIENT INSTRUCTIONS
PLEASE READ YOUR DISCHARGE INSTRUCTIONS ENTIRELY AS IT CONTAINS IMPORTANT INFORMATION.    Take the  Bentyl as needed for stomach cramping may make you drowsy do not drive after      Use gatorade/pedialyte or rehydration packets to help stay hydrated. Vitamin water and plain water do not contain rehydrating electrolytes.  Increase clear liquids (water, gatorade, pedialyte, broths, jello, etc) Hold off on solids for 12-18 hours. Then advance to BRAT diet (banana, rice, applesauce, tea, toast/crackers), then advance further as tolerated. Avoid spicy or fatty foods.   Use Peptobismol to help alleviate your diarrhea symptoms.     Avoid imodium unless you have more than 6 loose stools in 24 hours.       Wash hands frequently while sick. Avoid ibuprofen or other NSAIDS until you are well.     Please go to the ER if you experience worsening pain, blood in your vomit or stool, high fever, dizziness, fainting, swelling of your abdomen, inability to pass gas or stool.     Follow-up with your primary care doctor as needed if symptoms not resolving but not worsening      Please arrange follow up with your primary medical clinic as soon as possible. You must understand that you've received an Urgent Care treatment only and that you may be released before all of your medical problems are known or treated. You, the patient, will arrange for follow up as instructed. If your symptoms worsen or fail to improve you should go to the Emergency Room.  WE CANNOT RULE OUT ALL POSSIBLE CAUSES OF YOUR SYMPTOMS IN THE URGENT CARE SETTING PLEASE GO TO THE ER IF YOU FEELS YOUR CONDITION IS WORSENING OR YOU WOULD LIKE EMERGENT EVALUATION.      Noninfectious Gastroenteritis (Ages 6 Years to Adult)    Gastroenteritis can cause nausea, vomiting, diarrhea, and abdominal cramping. This may occur as a result of food sensitivity, inflammation of your gastrointestinal tract, medicines, stress, or other causes not related to infection. Your symptoms  will usually last from 1 to 3 days, but can last longer. Antibiotics are not effective, but simple home treatment will be helpful.  Home care  Medicine  · You may use acetaminophen or NSAID medicines like ibuprofen or naproxen to control fever, unless another medicine is prescribed. (Note: If you have chronic liver or kidney disease, or ever had a stomach ulcer or gastrointestinalI bleeding, talk with your healthcare provider before using these medicines.) Aspirin should never be used in anyone under 18 years of age who is ill with a fever. It may cause severe liver damage. Don't increase your NSAID medicines if you are already taking these medicines for another condition (like arthritis). Don't use NSAIDS if you are on aspirin (such as for heart disease, or after a stroke).  · If medicines for diarrhea or vomiting are prescribed, take only as directed.  General care and preventing spread of the illness  · If symptoms are severe, rest at home for the next 24 hours or until you feel better.  · Hand washing with soap and water is the best way to prevent the spread of infection. Wash your hands after touching anyone who is sick.  · Wash your hands after using the toilet and before meals. Clean the toilet after each use.  · Caffeine, tobacco, and alcohol can make your diarrhea, cramping, and pain worse.  Diet  · Water and clear liquids are important so you do not get dehydrated. Drink a small amount at a time.  · Do not force yourself to eat, especially if you have cramps, vomiting, or diarrhea. When you finally decide to start eating, do not eat large amounts at a time, even if you are hungry.  · If you eat, avoid fatty, greasy, spicy, or fried foods.  · Do not eat dairy products if you have diarrhea; they can make the diarrhea worse.  During the first 24 hours (the first full day), follow the diet below:  · Beverages: Water, clear liquids, soft drinks without caffeine, like ginger ale; mineral water (plain or  flavored); decaffeinated tea and coffee.  · Soups: Clear broth, consommé, and bouillon Sports drinks aren't a good choice because they have too much sugar and not enough electrolytes. In this case, commercially available products called oral rehydration solutions are best.  · Desserts: Plain gelatin, popsicles, and fruit juice bars.  During the next 24 hours (the second day), you may add the following to the above if you have improved. If not, continue what you did the first day:  · Hot cereal, plain toast, bread, rolls, crackers  · Plain noodles, rice, mashed potatoes, chicken noodle or rice soup  · Unsweetened canned fruit (avoid pineapple), bananas  · Limit caffeine and chocolate. No spices or seasonings except salt.  During the next 24 hours  · Gradually resume a normal diet, as you feel better and your symptoms improve.  · If at any time your symptoms start getting worse, go back to clear liquids until you feel better.  Food preparation  · If you have diarrhea, you should not prepare food for others. When you  prepare food for yourself, wash your hands before and after.  · Wash your hands after using cutting boards, countertops, and knives that have been in contact with raw food.  · Keep uncooked meats away from cooked and ready-to-eat foods.  Follow-up care  Follow up with your healthcare provider if you are not improving over the next 2 to 3 days, or as advised. If a stool (diarrhea) sample was taken, call for the results as directed.  When to seek medical care  Call your healthcare provider right away if any of these occur:   · Increasing abdominal pain or constant lower right abdominal pain  · Continued vomiting (unable to keep liquids down)  · Frequent diarrhea (more than 5 times a day)  · Blood in vomit or stool (black or red color)  · Inability to tolerate solid food after a few days.  · Dark urine, reduced urine output  · Weakness, dizziness  · Drowsiness  · Fever of 100.4ºF (38.0ºC) or higher, or as  directed by your healthcare provider  · New rash  Call 911  Call 911 if any of these occur:  · Trouble breathing  · Chest pain  · Confusion  · Severe drowsiness or trouble awakening  · Seizure  · Stiff neck  Date Last Reviewed: 11/16/2015  © 1161-9405 [employer]. [address] [place] [address]. All rights reserved. This information is not intended as a substitute for professional medical care. Always follow your healthcare professional's instructions.

## 2020-07-09 NOTE — PROGRESS NOTES
"Subjective:       Patient ID: Pastora Cota is a 54 y.o. female.    Vitals:  height is 5' 4" (1.626 m) and weight is 76.7 kg (169 lb). Her temperature is 97.9 °F (36.6 °C). Her blood pressure is 165/92 (abnormal) and her pulse is 84. Her respiration is 18 and oxygen saturation is 97%.     Chief Complaint: Abdominal Pain (STARTED LAST NIGHT AFTER DINNER), Gas, and Bloated    Patient states few hours after eating dinner last night (steak and vegetable soup) she began feeling upper abdominal cramping - feels symptoms more with certain movements like bending down or taking in a deep breath however no chest pain or shortness of breath.  No nausea vomiting diarrhea.  She had a normal bowel movement this morning without blood.  Nothing dark black.  No fever cough shortness of breath chest pain.  No one at dinner sick with similar symptoms.  No recent travel antibiotic use raw food consumption.  She tried taking Mylanta.  She has history of infectious colitis this is not feel nearly as bad as that pain did.  No frequency urgency dysuria hematuria flank pain.  She did have some lower back pain..     Abdominal Pain  This is a new (AFTER EATING STEAK,SOUP) problem. The current episode started yesterday. The onset quality is sudden (AFTER DINNER AT 7PM AFTER EATING OUT ). The problem occurs constantly. The problem has been unchanged. The pain is at a severity of 4/10. The pain is mild. The abdominal pain does not radiate. Pertinent negatives include no constipation, diarrhea, dysuria, fever, nausea or vomiting. The pain is relieved by nothing. There is no history of abdominal surgery, GERD, pancreatitis or ulcerative colitis. Patient's medical history does not include kidney stones and UTI.       Constitution: Negative for appetite change, chills, sweating and fever.   HENT: Negative for trouble swallowing.    Cardiovascular: Negative for chest pain.   Respiratory: Negative for shortness of breath.    Gastrointestinal: " Positive for abdominal pain and abdominal bloating. Negative for abdominal trauma, history of abdominal surgery, nausea, vomiting, constipation, diarrhea, dark colored stools and heartburn.   Genitourinary: Negative for dysuria, missed menses and pelvic pain.   Musculoskeletal: Negative for back pain.       Objective:      Physical Exam   Constitutional: She is oriented to person, place, and time. She appears well-developed.   HENT:   Head: Normocephalic and atraumatic.   Right Ear: External ear normal.   Left Ear: External ear normal.   Nose: Nose normal.   Mouth/Throat: Mucous membranes are normal.   Eyes: Conjunctivae and lids are normal.   Neck: Trachea normal and full passive range of motion without pain. Neck supple.   Cardiovascular: Normal rate, regular rhythm and normal heart sounds.   Pulmonary/Chest: Effort normal and breath sounds normal. No respiratory distress.   Abdominal: Soft. Normal appearance and bowel sounds are normal. She exhibits no distension, no abdominal bruit, no pulsatile midline mass and no mass. There is abdominal tenderness (mild no rebound tenderness or guarding) in the epigastric area and periumbilical area. There is no rebound, no guarding, no tenderness at McBurney's point and negative Kauffman's sign.   Musculoskeletal: Normal range of motion.   Neurological: She is alert and oriented to person, place, and time. She has normal strength.   Skin: Skin is warm, dry, intact, not diaphoretic and not pale.   Psychiatric: Her speech is normal and behavior is normal. Judgment and thought content normal.   Nursing note and vitals reviewed.    Results for orders placed or performed in visit on 07/08/20   POCT Urinalysis, Dipstick, Automated, W/O Scope   Result Value Ref Range    POC Blood, Urine Negative Negative    POC Bilirubin, Urine Negative Negative    POC Urobilinogen, Urine normal 0.1 - 1.1    POC Ketones, Urine Negative Negative    POC Protein, Urine Negative Negative    POC Nitrates,  Urine Negative Negative    POC Glucose, Urine Negative Negative    pH, UA 7.0     POC Specific Gravity, Urine 1.010 1.003 - 1.029    POC Leukocytes, Urine Negative Negative     X-ray Abdomen Flat And Erect    Result Date: 7/8/2020  EXAMINATION: XR ABDOMEN FLAT AND ERECT CLINICAL HISTORY: Upper abdominal pain, unspecified TECHNIQUE: Flat and erect AP views of the abdomen were performed. COMPARISON: None FINDINGS: Two upright views, 2 supine views. No significant air-fluid levels on upright view.  Air and stool is seen within the large bowel and projected over the rectum noting moderate to large amount of stool projected over the right colon.  There are no calcifications to convincingly suggest nephrolithiasis.  Surgical changes are noted of cholecystectomy.  No large volume free air or pneumatosis.  The lower lung zones are grossly clear.  The osseous structures are intact.     1. Nonobstructive bowel gas pattern noting moderate to large amount of stool within the right colon. Electronically signed by: Jed Escobar MD Date:    07/08/2020 Time:    20:28        Assessment:       1. Gastroenteritis        Plan:         Gastroenteritis  -     POCT Urinalysis, Dipstick, Automated, W/O Scope  -     X-Ray Abdomen Flat And Erect; Future; Expected date: 07/08/2020  -     dicyclomine tablet 20 mg  -     dicyclomine (BENTYL) 10 MG capsule; Take 1 capsule (10 mg total) by mouth every 6 (six) hours as needed.  Dispense: 20 capsule; Refill: 0     Discussed ER precautions, symptomatic treatment at home if not improving follow-up with primary care patient agreeable  Patient's  driving her home tonight  Patient Instructions   PLEASE READ YOUR DISCHARGE INSTRUCTIONS ENTIRELY AS IT CONTAINS IMPORTANT INFORMATION.    Take the zofran for nausea (it dissolves under your tongue)       Use gatorade/pedialyte or rehydration packets to help stay hydrated. Vitamin water and plain water do not contain rehydrating  electrolytes.  Increase clear liquids (water, gatorade, pedialyte, broths, jello, etc) Hold off on solids for 12-18 hours. Then advance to BRAT diet (banana, rice, applesauce, tea, toast/crackers), then advance further as tolerated. Avoid spicy or fatty foods.   Use Peptobismol to help alleviate your diarrhea symptoms.     Avoid imodium unless you have more than 6 loose stools in 24 hours.       Wash hands frequently while sick. Avoid ibuprofen or other NSAIDS until you are well.     Please go to the ER if you experience worsening pain, blood in your vomit or stool, high fever, dizziness, fainting, swelling of your abdomen, inability to pass gas or stool.     Follow-up with your primary care doctor as needed if symptoms not resolving but not worsening      Please arrange follow up with your primary medical clinic as soon as possible. You must understand that you've received an Urgent Care treatment only and that you may be released before all of your medical problems are known or treated. You, the patient, will arrange for follow up as instructed. If your symptoms worsen or fail to improve you should go to the Emergency Room.  WE CANNOT RULE OUT ALL POSSIBLE CAUSES OF YOUR SYMPTOMS IN THE URGENT CARE SETTING PLEASE GO TO THE ER IF YOU FEELS YOUR CONDITION IS WORSENING OR YOU WOULD LIKE EMERGENT EVALUATION.      Noninfectious Gastroenteritis (Ages 6 Years to Adult)    Gastroenteritis can cause nausea, vomiting, diarrhea, and abdominal cramping. This may occur as a result of food sensitivity, inflammation of your gastrointestinal tract, medicines, stress, or other causes not related to infection. Your symptoms will usually last from 1 to 3 days, but can last longer. Antibiotics are not effective, but simple home treatment will be helpful.  Home care  Medicine  · You may use acetaminophen or NSAID medicines like ibuprofen or naproxen to control fever, unless another medicine is prescribed. (Note: If you have chronic  liver or kidney disease, or ever had a stomach ulcer or gastrointestinalI bleeding, talk with your healthcare provider before using these medicines.) Aspirin should never be used in anyone under 18 years of age who is ill with a fever. It may cause severe liver damage. Don't increase your NSAID medicines if you are already taking these medicines for another condition (like arthritis). Don't use NSAIDS if you are on aspirin (such as for heart disease, or after a stroke).  · If medicines for diarrhea or vomiting are prescribed, take only as directed.  General care and preventing spread of the illness  · If symptoms are severe, rest at home for the next 24 hours or until you feel better.  · Hand washing with soap and water is the best way to prevent the spread of infection. Wash your hands after touching anyone who is sick.  · Wash your hands after using the toilet and before meals. Clean the toilet after each use.  · Caffeine, tobacco, and alcohol can make your diarrhea, cramping, and pain worse.  Diet  · Water and clear liquids are important so you do not get dehydrated. Drink a small amount at a time.  · Do not force yourself to eat, especially if you have cramps, vomiting, or diarrhea. When you finally decide to start eating, do not eat large amounts at a time, even if you are hungry.  · If you eat, avoid fatty, greasy, spicy, or fried foods.  · Do not eat dairy products if you have diarrhea; they can make the diarrhea worse.  During the first 24 hours (the first full day), follow the diet below:  · Beverages: Water, clear liquids, soft drinks without caffeine, like ginger ale; mineral water (plain or flavored); decaffeinated tea and coffee.  · Soups: Clear broth, consommé, and bouillon Sports drinks aren't a good choice because they have too much sugar and not enough electrolytes. In this case, commercially available products called oral rehydration solutions are best.  · Desserts: Plain gelatin, popsicles, and  fruit juice bars.  During the next 24 hours (the second day), you may add the following to the above if you have improved. If not, continue what you did the first day:  · Hot cereal, plain toast, bread, rolls, crackers  · Plain noodles, rice, mashed potatoes, chicken noodle or rice soup  · Unsweetened canned fruit (avoid pineapple), bananas  · Limit caffeine and chocolate. No spices or seasonings except salt.  During the next 24 hours  · Gradually resume a normal diet, as you feel better and your symptoms improve.  · If at any time your symptoms start getting worse, go back to clear liquids until you feel better.  Food preparation  · If you have diarrhea, you should not prepare food for others. When you  prepare food for yourself, wash your hands before and after.  · Wash your hands after using cutting boards, countertops, and knives that have been in contact with raw food.  · Keep uncooked meats away from cooked and ready-to-eat foods.  Follow-up care  Follow up with your healthcare provider if you are not improving over the next 2 to 3 days, or as advised. If a stool (diarrhea) sample was taken, call for the results as directed.  When to seek medical care  Call your healthcare provider right away if any of these occur:   · Increasing abdominal pain or constant lower right abdominal pain  · Continued vomiting (unable to keep liquids down)  · Frequent diarrhea (more than 5 times a day)  · Blood in vomit or stool (black or red color)  · Inability to tolerate solid food after a few days.  · Dark urine, reduced urine output  · Weakness, dizziness  · Drowsiness  · Fever of 100.4ºF (38.0ºC) or higher, or as directed by your healthcare provider  · New rash  Call 911  Call 911 if any of these occur:  · Trouble breathing  · Chest pain  · Confusion  · Severe drowsiness or trouble awakening  · Seizure  · Stiff neck  Date Last Reviewed: 11/16/2015  © 8383-7975 [employer]. [address] [place] [address], [place]  [address]. All rights reserved. This information is not intended as a substitute for professional medical care. Always follow your healthcare professional's instructions.

## 2020-07-17 ENCOUNTER — PATIENT OUTREACH (OUTPATIENT)
Dept: ADMINISTRATIVE | Facility: OTHER | Age: 55
End: 2020-07-17

## 2020-07-20 ENCOUNTER — OFFICE VISIT (OUTPATIENT)
Dept: OPTOMETRY | Facility: CLINIC | Age: 55
End: 2020-07-20
Payer: COMMERCIAL

## 2020-07-20 DIAGNOSIS — H52.4 BILATERAL PRESBYOPIA: ICD-10-CM

## 2020-07-20 DIAGNOSIS — H25.13 NUCLEAR SCLEROSIS OF BOTH EYES: ICD-10-CM

## 2020-07-20 DIAGNOSIS — H04.123 DRY EYE SYNDROME OF BOTH EYES: Primary | ICD-10-CM

## 2020-07-20 PROCEDURE — 92014 PR EYE EXAM, EST PATIENT,COMPREHESV: ICD-10-PCS | Mod: S$GLB,,, | Performed by: OPTOMETRIST

## 2020-07-20 PROCEDURE — 99999 PR PBB SHADOW E&M-EST. PATIENT-LVL III: ICD-10-PCS | Mod: PBBFAC,,, | Performed by: OPTOMETRIST

## 2020-07-20 PROCEDURE — 99999 PR PBB SHADOW E&M-EST. PATIENT-LVL III: CPT | Mod: PBBFAC,,, | Performed by: OPTOMETRIST

## 2020-07-20 PROCEDURE — 92014 COMPRE OPH EXAM EST PT 1/>: CPT | Mod: S$GLB,,, | Performed by: OPTOMETRIST

## 2020-07-20 NOTE — PROGRESS NOTES
HPI     LINDA:07/2019  Glasses? Yes otc rdrs   Contacts? no  H/o eye surgery, injections or laser: no  H/o eye injury: no  Known eye conditions? no  Family h/o eye conditions? Father-cataract sx   Eye gtts?OTC rdrs     (+) Flashes h/o hasnt seen them in awhile  (+) Floaters h/o hasnt seen   them in awhile  (-) Mucous   (-) Tearing (-) Itching (+) Burning  OU infrequent   (-) Headaches (-) Eye Pain/discomfort (-) Irritation   (-) Redness (-) Double vision (+) Blurry vision when she wakes up in the   morning, takes a moment to focus     Diabetic? (-)  A1c?  (Hemoglobin A1C       Date                     Value               Ref Range             Status                04/07/2019               5.5                 4.0 - 5.6 %           Final              Comment:    ADA Screening Guidelines:  5.7-6.4%  Consistent with   prediabetes  >or=6.5%  Consistent with diabetes  High levels of fetal   hemoglobin interfere with the HbA1C  assay. Heterozygous hemoglobin   variants (HbS, HgC, etc)do  not significantly interfere with this assay.     However, presence of multiple variants may affect accuracy.         03/07/2019               5.8 (H)             4.0 - 5.6 %           Final              Comment:    ADA Screening Guidelines:  5.7-6.4%  Consistent with   prediabetes  >or=6.5%  Consistent with diabetes  High levels of fetal   hemoglobin interfere with the HbA1C  assay. Heterozygous hemoglobin   variants (HbS, HgC, etc)do  not significantly interfere with this assay.     However, presence of multiple variants may affect accuracy.         08/15/2018               5.7 (H)             4.0 - 5.6 %           Final              Comment:    ADA Screening Guidelines:  5.7-6.4%  Consistent with   prediabetes  >or=6.5%  Consistent with diabetes  High levels of fetal   hemoglobin interfere with the HbA1C  assay. Heterozygous hemoglobin   variants (HbS, HgC, etc)do  not significantly interfere with this assay.     However, presence of  multiple variants may affect accuracy.    ----------)          Last edited by Dinah Anglin on 7/20/2020  3:37 PM. (History)            Assessment /Plan     For exam results, see Encounter Report.    Dry eye syndrome of both eyes    Bilateral presbyopia    Nuclear sclerosis of both eyes      1. Recommend Systane Ultra or Refresh Optive BID-TID OU to aid with symptoms of dry eyes.  2. Cont w/OTC readers   3. Nuclear sclerotic cataract - not visually significant. Observe.

## 2020-07-24 ENCOUNTER — HOSPITAL ENCOUNTER (OUTPATIENT)
Dept: RADIOLOGY | Facility: CLINIC | Age: 55
Discharge: HOME | End: 2020-07-24
Attending: INTERNAL MEDICINE
Payer: COMMERCIAL

## 2020-07-24 ENCOUNTER — HOSPITAL ENCOUNTER (OUTPATIENT)
Dept: ENDOCRINOLOGY | Facility: CLINIC | Age: 55
Discharge: HOME | End: 2020-07-24
Attending: INTERNAL MEDICINE
Payer: COMMERCIAL

## 2020-07-24 DIAGNOSIS — E04.2 MULTINODULAR GOITER: ICD-10-CM

## 2020-07-24 DIAGNOSIS — E21.0 PRIMARY HYPERPARATHYROIDISM: ICD-10-CM

## 2020-07-24 PROCEDURE — 77080 DEXA BONE DENSITY SPINE HIP: ICD-10-PCS | Mod: 26,,, | Performed by: INTERNAL MEDICINE

## 2020-07-24 PROCEDURE — 77080 DXA BONE DENSITY AXIAL: CPT | Mod: TC

## 2020-07-24 PROCEDURE — 76536 US EXAM OF HEAD AND NECK: CPT | Mod: S$GLB,,, | Performed by: INTERNAL MEDICINE

## 2020-07-24 PROCEDURE — 76536 US SOFT TISSUE HEAD NECK THYROID: ICD-10-PCS | Mod: S$GLB,,, | Performed by: INTERNAL MEDICINE

## 2020-07-24 PROCEDURE — 77080 DXA BONE DENSITY AXIAL: CPT | Mod: 26,,, | Performed by: INTERNAL MEDICINE

## 2020-08-18 ENCOUNTER — PATIENT MESSAGE (OUTPATIENT)
Dept: ENDOCRINOLOGY | Facility: CLINIC | Age: 55
End: 2020-08-18

## 2020-08-24 ENCOUNTER — PATIENT OUTREACH (OUTPATIENT)
Dept: ADMINISTRATIVE | Facility: OTHER | Age: 55
End: 2020-08-24

## 2020-08-24 NOTE — PROGRESS NOTES
Care Everywhere: updated  Immunization: updated  Health Maintenance: updated  Media Review:   Legacy Review:   Order placed:   Upcoming appts:  Scheduling ticket for annual primary care visit sent to patient's portal

## 2020-08-25 ENCOUNTER — OFFICE VISIT (OUTPATIENT)
Dept: ENDOCRINOLOGY | Facility: CLINIC | Age: 55
End: 2020-08-25
Payer: COMMERCIAL

## 2020-08-25 DIAGNOSIS — E04.2 MULTINODULAR GOITER: Primary | ICD-10-CM

## 2020-08-25 DIAGNOSIS — E21.0 PRIMARY HYPERPARATHYROIDISM: ICD-10-CM

## 2020-08-25 DIAGNOSIS — I10 ESSENTIAL HYPERTENSION: ICD-10-CM

## 2020-08-25 DIAGNOSIS — M81.0 OSTEOPOROSIS, UNSPECIFIED OSTEOPOROSIS TYPE, UNSPECIFIED PATHOLOGICAL FRACTURE PRESENCE: ICD-10-CM

## 2020-08-25 DIAGNOSIS — R73.02 IGT (IMPAIRED GLUCOSE TOLERANCE): ICD-10-CM

## 2020-08-25 PROCEDURE — 99214 PR OFFICE/OUTPT VISIT, EST, LEVL IV, 30-39 MIN: ICD-10-PCS | Mod: 95,,, | Performed by: INTERNAL MEDICINE

## 2020-08-25 PROCEDURE — 99214 OFFICE O/P EST MOD 30 MIN: CPT | Mod: 95,,, | Performed by: INTERNAL MEDICINE

## 2020-08-25 RX ORDER — ACETAMINOPHEN 500 MG
1 TABLET ORAL DAILY
Start: 2020-08-25

## 2020-08-25 NOTE — PROGRESS NOTES
Subjective:      Patient ID: Pastora Cota is a 54 y.o. female.    Chief Complaint:  Primary hyperparathyroidism      History of Present Illness  With regards to Primary hyperparathyroidism     She has episodic hypercalcemia     She takes 1200 mg calcium daily plus 3-4 dairy servings.  She is  off HCTZ.  Denies constipation, bone pain, weakness.   on ergo 50 k weekly     s/p right inferior parathyroidectomy on 12/11/19 for primary hyperparathyroidism - Dr Wilson   No s/s of hypocalcemia       With regards to the Osteoporosis     dx on bmd 4/25/19  Impression       Osteoporosis of the distal 1/3 forearm       Last bmd 7/24/20  Impression:     1. Osteoporosis  2. Compared with previous DXA, BMD at the lumbar spine has remained stable, BMD at the left forearm has declined by 5.1%, and the BMD at the total hip has increased by 3.7%.      No recent falls or fractures     With regards to the MNG    In 2011 she presented with palpitations   She had a ct of the chest  The goiter was noted at that time  She had an u/s done and was advised to f/u     Last thyroid ultrasound 7/24/20  Impression:     1.  Thyroid gland is normal in size with homogenous echotexture.     2.  Multiple nodules in the right thyroid.  None meet criteria for fine-needle aspiration.     3.  Multiple nodules in the left thyroid. Left inferior nodule meets criteria for fine-needle aspiration.     RECOMMENDATIONS:  Repeat FNA of Left Inferior thyroid nodule.  Previous biopsy revealed benign pathology however this nodule is noted to be taller than wide and very hypoechoic on today's exam.        fna done 6/5/19    THYROID, LEFT INFERIOR NODULE, FINE-NEEDLE ASPIRATION:  Satisfactory for interpretation  [place] [facility] thyroid cytology category: Benign  Benign follicular epithelial cells, abundant macrophages and watery colloid consistent with benign colloid nodule  with cystic degeneration    she does not notice the goiter  No voice changes, no  difficulty swallowing or breathing  no FH of thyroid disease or disease   no h/o radiation treatment or exposure  Did have light therapy for acne           With regards to   igt  ....        Denies symptoms of hyperglycemia such as polyuria, polydipsia, nocturia, unexplained weight loss or blurred vision  Gained a few lbs with covid          Review of Systems   Constitutional: Negative for unexpected weight change.   Eyes: Negative for visual disturbance.   Respiratory: Negative for shortness of breath.    Cardiovascular: Negative for chest pain.   Gastrointestinal: Negative for abdominal pain.   Musculoskeletal: Negative for arthralgias.   Skin: Negative for wound.   Neurological: Negative for headaches.   Hematological: Does not bruise/bleed easily.   Psychiatric/Behavioral: Negative for sleep disturbance.       Objective:   Physical Exam  Psychiatric:         Attention and Perception: Attention normal.         Mood and Affect: Mood normal.         Speech: Speech normal.         Behavior: Behavior normal.         Thought Content: Thought content normal.         Judgment: Judgment normal.         There is no height or weight on file to calculate BMI.    Lab Review:   Lab Results   Component Value Date    HGBA1C 5.8 (H) 07/24/2020     Lab Results   Component Value Date    CHOL 130 04/07/2019    HDL 38 (L) 04/07/2019    LDLCALC 85.6 04/07/2019    TRIG 32 04/07/2019    CHOLHDL 29.2 04/07/2019     Lab Results   Component Value Date     07/24/2020    K 4.4 07/24/2020     07/24/2020    CO2 26 07/24/2020    GLU 90 07/24/2020    BUN 14 07/24/2020    CREATININE 0.9 07/24/2020    CALCIUM 9.7 07/24/2020    PROT 8.1 11/21/2019    ALBUMIN 4.0 07/24/2020    BILITOT 0.2 11/21/2019    ALKPHOS 104 11/21/2019    AST 16 11/21/2019    ALT 18 11/21/2019    ANIONGAP 9 07/24/2020    ESTGFRAFRICA >60.0 07/24/2020    EGFRNONAA >60.0 07/24/2020    TSH 0.586 07/24/2020       Assessment and Plan     Multinodular goiter  I have  reviewed management options including observation, FNA or surgery.  All of the patients questions were answered and handout was provided.     Will proceed with FNA     Reviewed that a non diagnostic or FLUS would require a repeat FNA       Discussed surgical indications (abnormal FNA, compressive symptoms or interval change)       If FNA is negative then plan follow up in 1 year with TSH and thyroid u/s prior       Osteoporosis  Anticipate improvement after parathyroidectomy.  Repeat bone density 2 years  10 yr frax is low     stop ergo       Essential hypertension  Follow at home       IGT (impaired glucose tolerance)    alerted as to the increased risk for t2DM  Stressed diet and exercise   Periodic hba1c levels      Primary hyperparathyroidism  resloved   S/p Surgery           The patient location is: home  The chief complaint leading to consultation is: above    Visit type: audiovisual    Face to Face time with patient: 30 minutes of total time spent on the encounter, which includes face to face time and non-face to face time preparing to see the patient (eg, review of tests), Obtaining and/or reviewing separately obtained history, Documenting clinical information in the electronic or other health record, Independently interpreting results (not separately reported) and communicating results to the patient/family/caregiver, or Care coordination (not separately reported).         Each patient to whom he or she provides medical services by telemedicine is:  (1) informed of the relationship between the physician and patient and the respective role of any other health care provider with respect to management of the patient; and (2) notified that he or she may decline to receive medical services by telemedicine and may withdraw from such care at any time.

## 2020-08-25 NOTE — ASSESSMENT & PLAN NOTE
I have reviewed management options including observation, FNA or surgery.  All of the patients questions were answered and handout was provided.     Will proceed with FNA     Reviewed that a non diagnostic or FLUS would require a repeat FNA       Discussed surgical indications (abnormal FNA, compressive symptoms or interval change)       If FNA is negative then plan follow up in 1 year with TSH and thyroid u/s prior

## 2020-08-25 NOTE — PATIENT INSTRUCTIONS
Thank you for completing a virtual visit with me!     Per our conversation your medication changes are as follows: stop the prescription vitamin D.   We will plan a thyroid biopsy soon.     We will plan a telemedicine or an in-clinic visit in  12 months with labs and a thyroid ultrasound prior to that appointment.    My staff will contact you to schedule the above.     Please let me know if you have any other questions.    Thank you,  Valerie Kaba MD

## 2020-08-25 NOTE — ASSESSMENT & PLAN NOTE
Anticipate improvement after parathyroidectomy.  Repeat bone density 2 years  10 yr frax is low     stop ergo

## 2020-08-26 ENCOUNTER — HOSPITAL ENCOUNTER (OUTPATIENT)
Dept: ENDOCRINOLOGY | Facility: CLINIC | Age: 55
Discharge: HOME | End: 2020-08-26
Attending: INTERNAL MEDICINE
Payer: COMMERCIAL

## 2020-08-26 DIAGNOSIS — E04.1 THYROID NODULE: Primary | ICD-10-CM

## 2020-08-26 DIAGNOSIS — E04.2 MULTINODULAR GOITER: ICD-10-CM

## 2020-08-26 PROCEDURE — 88173 CYTOPATH EVAL FNA REPORT: CPT | Mod: 26,,, | Performed by: PATHOLOGY

## 2020-08-26 PROCEDURE — 88173 CYTOPATH EVAL FNA REPORT: CPT | Performed by: PATHOLOGY

## 2020-08-26 PROCEDURE — 10005 US FINE NEEDLE ASPIRATION BIOPSY, FIRST LESION: ICD-10-PCS | Mod: S$GLB,,, | Performed by: INTERNAL MEDICINE

## 2020-08-26 PROCEDURE — 88173 PR  INTERPRETATION OF FNA SMEAR: ICD-10-PCS | Mod: 26,,, | Performed by: PATHOLOGY

## 2020-08-26 PROCEDURE — 10005 FNA BX W/US GDN 1ST LES: CPT | Mod: S$GLB,,, | Performed by: INTERNAL MEDICINE

## 2020-08-28 LAB — FINAL PATHOLOGIC DIAGNOSIS: NORMAL

## 2020-09-01 ENCOUNTER — PATIENT MESSAGE (OUTPATIENT)
Dept: ENDOCRINOLOGY | Facility: CLINIC | Age: 55
End: 2020-09-01

## 2020-09-01 DIAGNOSIS — E04.2 MULTINODULAR GOITER: Primary | ICD-10-CM

## 2020-09-01 DIAGNOSIS — R73.02 IGT (IMPAIRED GLUCOSE TOLERANCE): ICD-10-CM

## 2020-09-01 DIAGNOSIS — M81.0 OSTEOPOROSIS, UNSPECIFIED OSTEOPOROSIS TYPE, UNSPECIFIED PATHOLOGICAL FRACTURE PRESENCE: ICD-10-CM

## 2020-10-04 ENCOUNTER — PATIENT MESSAGE (OUTPATIENT)
Dept: OBSTETRICS AND GYNECOLOGY | Facility: CLINIC | Age: 55
End: 2020-10-04

## 2020-10-22 ENCOUNTER — PATIENT MESSAGE (OUTPATIENT)
Dept: OBSTETRICS AND GYNECOLOGY | Facility: CLINIC | Age: 55
End: 2020-10-22

## 2020-10-26 ENCOUNTER — PATIENT OUTREACH (OUTPATIENT)
Dept: ADMINISTRATIVE | Facility: OTHER | Age: 55
End: 2020-10-26

## 2020-10-27 ENCOUNTER — OFFICE VISIT (OUTPATIENT)
Dept: OBSTETRICS AND GYNECOLOGY | Facility: CLINIC | Age: 55
End: 2020-10-27
Payer: COMMERCIAL

## 2020-10-27 VITALS
SYSTOLIC BLOOD PRESSURE: 128 MMHG | WEIGHT: 167.31 LBS | HEIGHT: 64 IN | DIASTOLIC BLOOD PRESSURE: 82 MMHG | BODY MASS INDEX: 28.56 KG/M2

## 2020-10-27 DIAGNOSIS — N89.8 VAGINAL DISCHARGE: Primary | ICD-10-CM

## 2020-10-27 DIAGNOSIS — N76.0 VAGINOSIS: ICD-10-CM

## 2020-10-27 PROCEDURE — 87481 CANDIDA DNA AMP PROBE: CPT | Mod: 59

## 2020-10-27 PROCEDURE — 99213 PR OFFICE/OUTPT VISIT, EST, LEVL III, 20-29 MIN: ICD-10-PCS | Mod: S$GLB,,, | Performed by: PHYSICIAN ASSISTANT

## 2020-10-27 PROCEDURE — 87801 DETECT AGNT MULT DNA AMPLI: CPT

## 2020-10-27 PROCEDURE — 3008F BODY MASS INDEX DOCD: CPT | Mod: CPTII,S$GLB,, | Performed by: PHYSICIAN ASSISTANT

## 2020-10-27 PROCEDURE — 87480 CANDIDA DNA DIR PROBE: CPT

## 2020-10-27 PROCEDURE — 99213 OFFICE O/P EST LOW 20 MIN: CPT | Mod: S$GLB,,, | Performed by: PHYSICIAN ASSISTANT

## 2020-10-27 PROCEDURE — 3008F PR BODY MASS INDEX (BMI) DOCUMENTED: ICD-10-PCS | Mod: CPTII,S$GLB,, | Performed by: PHYSICIAN ASSISTANT

## 2020-10-27 PROCEDURE — 3074F SYST BP LT 130 MM HG: CPT | Mod: CPTII,S$GLB,, | Performed by: PHYSICIAN ASSISTANT

## 2020-10-27 PROCEDURE — 87510 GARDNER VAG DNA DIR PROBE: CPT

## 2020-10-27 PROCEDURE — 3079F DIAST BP 80-89 MM HG: CPT | Mod: CPTII,S$GLB,, | Performed by: PHYSICIAN ASSISTANT

## 2020-10-27 PROCEDURE — 3079F PR MOST RECENT DIASTOLIC BLOOD PRESSURE 80-89 MM HG: ICD-10-PCS | Mod: CPTII,S$GLB,, | Performed by: PHYSICIAN ASSISTANT

## 2020-10-27 PROCEDURE — 3074F PR MOST RECENT SYSTOLIC BLOOD PRESSURE < 130 MM HG: ICD-10-PCS | Mod: CPTII,S$GLB,, | Performed by: PHYSICIAN ASSISTANT

## 2020-10-27 RX ORDER — METRONIDAZOLE 500 MG/1
500 TABLET ORAL EVERY 12 HOURS
Qty: 14 TABLET | Refills: 0 | Status: SHIPPED | OUTPATIENT
Start: 2020-10-27 | End: 2020-11-03

## 2020-10-27 NOTE — PROGRESS NOTES
Subjective:      Pastora Cota is a 54 y.o. female who presents for vaginal discharge. Having on and off x 3-4 months. Sometimes it is thick, sometimes it is thin. Will occasionally have an odor and slight itch, but not bothersome. Denies recent antibiotic use or changes in soaps or detergents. Takes mostly showers. Denies douching. A1c of 5.8 on 7/24/20 with endo. Admits to eating more sugar recently than usual.     Hospital Outpatient Visit on 08/26/2020   Component Date Value Ref Range Status    Final Pathologic Diagnosis 08/26/2020    Final                    Value:Thyroid, left inferior (fine needle aspiration):  [facility] Thyroid Cytology Category: Benign  Consistent with a benign  follicular nodule.  Other findings and comments:  Benign follicular epithelial cells.  Blood present.  Colloid present.         Past Medical History:   Diagnosis Date    Abnormal Pap smear     repeat normal    Abnormal Pap smear of cervix     Abnormal Pap smear of vagina     Allergy     seasonal    AR (allergic rhinitis)     Hypercalcemia 9/10/2015    Hypertension     IGT (impaired glucose tolerance) 9/9/2014    Pneumonia 12/2016     Past Surgical History:   Procedure Laterality Date    ABDOMINAL SURGERY      CHOLECYSTECTOMY      1993    COLONOSCOPY N/A 9/24/2016    Procedure: COLONOSCOPY;  Surgeon: Johnny Clayton MD;  Location: [facility] (4TH FLR);  Service: Endoscopy;  Laterality: N/A;    COLONOSCOPY N/A 12/27/2017    Procedure: COLONOSCOPY;  Surgeon: Johnny Clayton MD;  Location: [facility] (4TH FLR);  Service: Endoscopy;  Laterality: N/A;    COLPOSCOPY      DILATION AND CURETTAGE OF UTERUS      HYSTERECTOMY      2007 tahbso     OOPHORECTOMY      PARATHYROIDECTOMY Right 12/11/2019    Procedure: PARATHYROIDECTOMY-with intra-op iPTH levels possible subtotal;  Surgeon: Kiko Wilson MD;  Location: [address];  Service: General;  Laterality: Right;     Social History     Tobacco Use    Smoking  status: Never Smoker    Smokeless tobacco: Never Used   Substance Use Topics    Alcohol use: Yes     Alcohol/week: 0.0 standard drinks     Types: 1 Standard drinks or equivalent per week     Frequency: Monthly or less     Drinks per session: 1 or 2     Binge frequency: Never     Comment: socailly    Drug use: No     Family History   Problem Relation Age of Onset    Hypertension Mother     Diabetes Mother     Glaucoma Mother     Hypertension Sister         four sisters    Breast cancer Sister 44    Glaucoma Father     No Known Problems Brother     No Known Problems Maternal Aunt     No Known Problems Maternal Uncle     No Known Problems Paternal Aunt     No Known Problems Paternal Uncle     No Known Problems Maternal Grandmother     No Known Problems Maternal Grandfather     No Known Problems Paternal Grandmother     No Known Problems Paternal Grandfather     Thyroid cancer Neg Hx     Cancer Neg Hx     Colon cancer Neg Hx     Ovarian cancer Neg Hx     Melanoma Neg Hx     Psoriasis Neg Hx     Lupus Neg Hx     Amblyopia Neg Hx     Blindness Neg Hx     Cataracts Neg Hx     Macular degeneration Neg Hx     Retinal detachment Neg Hx     Strabismus Neg Hx     Stroke Neg Hx     Thyroid disease Neg Hx     Esophageal cancer Neg Hx      OB History    Para Term  AB Living   3 2 2   1     SAB TAB Ectopic Multiple Live Births   1              # Outcome Date GA Lbr Leonardo/2nd Weight Sex Delivery Anes PTL Lv   3 Term            2 Term            1 SAB                Current Outpatient Medications:     amLODIPine (NORVASC) 10 MG tablet, Take 1 tablet (10 mg total) by mouth once daily., Disp: 90 tablet, Rfl: 3    cholecalciferol, vitamin D3, (VITAMIN D3) 50 mcg (2,000 unit) Cap, Take 1 capsule (2,000 Units total) by mouth once daily., Disp: , Rfl:     furosemide (LASIX) 20 MG tablet, Take 1 tablet (20 mg total) by mouth once daily., Disp: 90 tablet, Rfl: 3    losartan (COZAAR) 100 MG  "tablet, Take 1 tablet (100 mg total) by mouth every evening., Disp: 90 tablet, Rfl: 3    metoprolol tartrate (LOPRESSOR) 25 MG tablet, Take 0.5 tablets (12.5 mg total) by mouth 2 (two) times daily., Disp: 90 tablet, Rfl: 2    spironolactone (ALDACTONE) 25 MG tablet, Take 1 tablet (25 mg total) by mouth once daily., Disp: 90 tablet, Rfl: 3    dicyclomine (BENTYL) 10 MG capsule, Take 1 capsule (10 mg total) by mouth every 6 (six) hours as needed., Disp: 20 capsule, Rfl: 0    hydrOXYzine HCl (ATARAX) 25 MG tablet, Take 1 tablet (25 mg total) by mouth 3 (three) times daily., Disp: 45 tablet, Rfl: 1    metroNIDAZOLE (FLAGYL) 500 MG tablet, Take 1 tablet (500 mg total) by mouth every 12 (twelve) hours. for 7 days, Disp: 14 tablet, Rfl: 0    The 10-year ASCVD risk score ([place] ELEUTERIO Jr., et al., 2013) is: 4.9%    Values used to calculate the score:      Age: 54 years      Sex: Female      Is Non- : Yes      Diabetic: No      Tobacco smoker: No      Systolic Blood Pressure: 128 mmHg      Is BP treated: Yes      HDL Cholesterol: 38 mg/dL      Total Cholesterol: 130 mg/dL    Review of Systems:  General: No fever, chills, or weight loss.  Chest: No chest pain, shortness of breath, or palpitations.  Breast: No pain, masses, or nipple discharge.  Vulva: No pain, lesions, or itching.  Vagina: No relaxation, or lesions. +Discharge  Abdomen: No pain, nausea, vomiting, diarrhea, or constipation.  Urinary: No incontinence, nocturia, frequency, or dysuria.  Extremities:  No leg cramps, edema, or calf pain.  Neurologic: No headaches, dizziness, or visual changes.    Objective:     Vitals:    10/27/20 1435   BP: 128/82   Weight: 75.9 kg (167 lb 5.3 oz)   Height: 5' 4" (1.626 m)   PainSc: 0-No pain     Body mass index is 28.72 kg/m².    PHYSICAL EXAM:  APPEARANCE: Well nourished, well developed, in no acute distress.  AFFECT: WNL, alert and oriented x 3  PELVIC: Normal external genitalia without lesions.  Normal " hair distribution.  Adequate perineal body, normal urethral meatus.  Vagina moist and well rugated without lesions, +thin white discharge Cervix pink, without lesions, discharge or tenderness.  No significant cystocele or rectocele.  Bimanual exam shows uterus to be normal size, regular, mobile and nontender.  Adnexa without masses or tenderness.    EXTREMITIES: No edema.    Assessment:       Vaginosis  -     Vaginosis Screen by DNA Probe  -     metroNIDAZOLE (FLAGYL) 500 MG tablet; Take 1 tablet (500 mg total) by mouth every 12 (twelve) hours. for 7 days  Dispense: 14 tablet; Refill: 0        Plan:   Start flagyl. Avoid alcohol with medication.  Send vaginosis screen.  Counseled on preventative measures of vaginosis.  She is scheduling follow up with PCP for routine annual exam and to check A1c as well.   Discussed boric acid suppositories if symptoms continue to return.  Follow up if not improving.     Instructed patient to call if she experiences any side effects or has any questions.

## 2020-10-28 LAB
CANDIDA RRNA VAG QL PROBE: NEGATIVE
G VAGINALIS RRNA GENITAL QL PROBE: POSITIVE
T VAGINALIS RRNA GENITAL QL PROBE: NEGATIVE

## 2020-11-03 ENCOUNTER — PATIENT MESSAGE (OUTPATIENT)
Dept: OBSTETRICS AND GYNECOLOGY | Facility: CLINIC | Age: 55
End: 2020-11-03

## 2020-11-03 DIAGNOSIS — N76.0 VAGINOSIS: Primary | ICD-10-CM

## 2020-11-03 RX ORDER — FLUCONAZOLE 150 MG/1
150 TABLET ORAL DAILY
Qty: 2 TABLET | Refills: 0 | Status: SHIPPED | OUTPATIENT
Start: 2020-11-03 | End: 2020-11-04

## 2020-11-09 ENCOUNTER — PATIENT MESSAGE (OUTPATIENT)
Dept: OBSTETRICS AND GYNECOLOGY | Facility: CLINIC | Age: 55
End: 2020-11-09

## 2020-11-09 ENCOUNTER — TELEPHONE (OUTPATIENT)
Dept: OBSTETRICS AND GYNECOLOGY | Facility: CLINIC | Age: 55
End: 2020-11-09

## 2020-11-09 DIAGNOSIS — Z12.31 ENCOUNTER FOR SCREENING MAMMOGRAM FOR MALIGNANT NEOPLASM OF BREAST: Primary | ICD-10-CM

## 2020-11-17 ENCOUNTER — HOSPITAL ENCOUNTER (OUTPATIENT)
Dept: RADIOLOGY | Facility: OTHER | Age: 55
Discharge: HOME | End: 2020-11-17
Attending: OBSTETRICS & GYNECOLOGY
Payer: COMMERCIAL

## 2020-11-17 DIAGNOSIS — Z12.31 ENCOUNTER FOR SCREENING MAMMOGRAM FOR MALIGNANT NEOPLASM OF BREAST: ICD-10-CM

## 2020-11-17 PROCEDURE — 77067 SCR MAMMO BI INCL CAD: CPT | Mod: TC

## 2020-11-17 PROCEDURE — 77063 BREAST TOMOSYNTHESIS BI: CPT | Mod: 26,,, | Performed by: RADIOLOGY

## 2020-11-17 PROCEDURE — 77067 MAMMO DIGITAL SCREENING BILAT WITH TOMO: ICD-10-PCS | Mod: 26,,, | Performed by: RADIOLOGY

## 2020-11-17 PROCEDURE — 77063 MAMMO DIGITAL SCREENING BILAT WITH TOMO: ICD-10-PCS | Mod: 26,,, | Performed by: RADIOLOGY

## 2020-11-17 PROCEDURE — 77067 SCR MAMMO BI INCL CAD: CPT | Mod: 26,,, | Performed by: RADIOLOGY

## 2020-11-24 ENCOUNTER — PATIENT MESSAGE (OUTPATIENT)
Dept: OBSTETRICS AND GYNECOLOGY | Facility: CLINIC | Age: 55
End: 2020-11-24

## 2020-11-24 DIAGNOSIS — I10 HYPERTENSION, UNSPECIFIED TYPE: ICD-10-CM

## 2020-11-24 RX ORDER — FUROSEMIDE 20 MG/1
20 TABLET ORAL DAILY
Qty: 90 TABLET | Refills: 2 | Status: SHIPPED | OUTPATIENT
Start: 2020-11-24 | End: 2021-08-10

## 2020-11-24 RX ORDER — AMLODIPINE BESYLATE 10 MG/1
10 TABLET ORAL DAILY
Qty: 90 TABLET | Refills: 2 | Status: SHIPPED | OUTPATIENT
Start: 2020-11-24 | End: 2021-08-10

## 2020-11-25 ENCOUNTER — TELEPHONE (OUTPATIENT)
Dept: OBSTETRICS AND GYNECOLOGY | Facility: CLINIC | Age: 55
End: 2020-11-25

## 2020-11-25 ENCOUNTER — OFFICE VISIT (OUTPATIENT)
Dept: OBSTETRICS AND GYNECOLOGY | Facility: CLINIC | Age: 55
End: 2020-11-25
Payer: COMMERCIAL

## 2020-11-25 VITALS
HEIGHT: 64 IN | DIASTOLIC BLOOD PRESSURE: 82 MMHG | WEIGHT: 164.44 LBS | BODY MASS INDEX: 28.07 KG/M2 | SYSTOLIC BLOOD PRESSURE: 126 MMHG

## 2020-11-25 DIAGNOSIS — N76.0 VAGINOSIS: Primary | ICD-10-CM

## 2020-11-25 PROCEDURE — 87510 GARDNER VAG DNA DIR PROBE: CPT

## 2020-11-25 PROCEDURE — 1126F AMNT PAIN NOTED NONE PRSNT: CPT | Mod: S$GLB,,, | Performed by: PHYSICIAN ASSISTANT

## 2020-11-25 PROCEDURE — 3074F PR MOST RECENT SYSTOLIC BLOOD PRESSURE < 130 MM HG: ICD-10-PCS | Mod: CPTII,S$GLB,, | Performed by: PHYSICIAN ASSISTANT

## 2020-11-25 PROCEDURE — 87481 CANDIDA DNA AMP PROBE: CPT | Mod: 59

## 2020-11-25 PROCEDURE — 3008F PR BODY MASS INDEX (BMI) DOCUMENTED: ICD-10-PCS | Mod: CPTII,S$GLB,, | Performed by: PHYSICIAN ASSISTANT

## 2020-11-25 PROCEDURE — 3079F DIAST BP 80-89 MM HG: CPT | Mod: CPTII,S$GLB,, | Performed by: PHYSICIAN ASSISTANT

## 2020-11-25 PROCEDURE — 87480 CANDIDA DNA DIR PROBE: CPT

## 2020-11-25 PROCEDURE — 3074F SYST BP LT 130 MM HG: CPT | Mod: CPTII,S$GLB,, | Performed by: PHYSICIAN ASSISTANT

## 2020-11-25 PROCEDURE — 1126F PR PAIN SEVERITY QUANTIFIED, NO PAIN PRESENT: ICD-10-PCS | Mod: S$GLB,,, | Performed by: PHYSICIAN ASSISTANT

## 2020-11-25 PROCEDURE — 99213 PR OFFICE/OUTPT VISIT, EST, LEVL III, 20-29 MIN: ICD-10-PCS | Mod: S$GLB,,, | Performed by: PHYSICIAN ASSISTANT

## 2020-11-25 PROCEDURE — 3008F BODY MASS INDEX DOCD: CPT | Mod: CPTII,S$GLB,, | Performed by: PHYSICIAN ASSISTANT

## 2020-11-25 PROCEDURE — 99213 OFFICE O/P EST LOW 20 MIN: CPT | Mod: S$GLB,,, | Performed by: PHYSICIAN ASSISTANT

## 2020-11-25 PROCEDURE — 3079F PR MOST RECENT DIASTOLIC BLOOD PRESSURE 80-89 MM HG: ICD-10-PCS | Mod: CPTII,S$GLB,, | Performed by: PHYSICIAN ASSISTANT

## 2020-11-25 NOTE — PROGRESS NOTES
Subjective:      Pastora Cota is a 55 y.o. female who presents for follow up of vaginosis. Was having on and off vaginal discharge and odor. Treated for BV one month ago with Flagyl and symptoms resolved. Seemed to develop secondary yeast infection from antibiotic so took Diflucan 150mg x 1. Symptoms had resolved until 2 days ago when developed thick discharge with slightly pinkish tint. Has had a hysterectomy. Took another diflucan 2 days ago and symptoms are improving. Still has slight thin white discharge and irritation. No odor or pain. Denies all UTI symptoms. No changes in soaps or detergents. Avoids baths and using gentle products.     Office Visit on 10/27/2020   Component Date Value Ref Range Status    Trichomonas vaginalis 10/27/2020 Negative  Negative Final    Gardnerella vaginalis 10/27/2020 Positive* Negative Final    Candida sp 10/27/2020 Negative  Negative Final   Hospital Outpatient Visit on 08/26/2020   Component Date Value Ref Range Status    Final Pathologic Diagnosis 08/26/2020    Final                    Value:Thyroid, left inferior (fine needle aspiration):  [facility] Thyroid Cytology Category: Benign  Consistent with a benign  follicular nodule.  Other findings and comments:  Benign follicular epithelial cells.  Blood present.  Colloid present.         Past Medical History:   Diagnosis Date    Abnormal Pap smear     repeat normal    Abnormal Pap smear of cervix     Abnormal Pap smear of vagina     Allergy     seasonal    AR (allergic rhinitis)     Hypercalcemia 9/10/2015    Hypertension     IGT (impaired glucose tolerance) 9/9/2014    Pneumonia 12/2016     Past Surgical History:   Procedure Laterality Date    ABDOMINAL SURGERY      CHOLECYSTECTOMY      1993    COLONOSCOPY N/A 9/24/2016    Procedure: COLONOSCOPY;  Surgeon: Johnny Clayton MD;  Location: [address];  Service: Endoscopy;  Laterality: N/A;    COLONOSCOPY N/A 12/27/2017    Procedure:  COLONOSCOPY;  Surgeon: Johnny Clayton MD;  Location: [facility] (4TH FLR);  Service: Endoscopy;  Laterality: N/A;    COLPOSCOPY      DILATION AND CURETTAGE OF UTERUS      HYSTERECTOMY       tahbso     OOPHORECTOMY      PARATHYROIDECTOMY Right 2019    Procedure: PARATHYROIDECTOMY-with intra-op iPTH levels possible subtotal;  Surgeon: Kiko Wilson MD;  Location: [facility] OR 2ND FLR;  Service: General;  Laterality: Right;     Social History     Tobacco Use    Smoking status: Never Smoker    Smokeless tobacco: Never Used   Substance Use Topics    Alcohol use: Yes     Alcohol/week: 0.0 standard drinks     Types: 1 Standard drinks or equivalent per week     Frequency: Monthly or less     Drinks per session: 1 or 2     Binge frequency: Never     Comment: socailly    Drug use: No     Family History   Problem Relation Age of Onset    Hypertension Mother     Diabetes Mother     Glaucoma Mother     Hypertension Sister         four sisters    Breast cancer Sister 44    Glaucoma Father     No Known Problems Brother     No Known Problems Maternal Aunt     No Known Problems Maternal Uncle     No Known Problems Paternal Aunt     No Known Problems Paternal Uncle     No Known Problems Maternal Grandmother     No Known Problems Maternal Grandfather     No Known Problems Paternal Grandmother     No Known Problems Paternal Grandfather     Thyroid cancer Neg Hx     Cancer Neg Hx     Colon cancer Neg Hx     Ovarian cancer Neg Hx     Melanoma Neg Hx     Psoriasis Neg Hx     Lupus Neg Hx     Amblyopia Neg Hx     Blindness Neg Hx     Cataracts Neg Hx     Macular degeneration Neg Hx     Retinal detachment Neg Hx     Strabismus Neg Hx     Stroke Neg Hx     Thyroid disease Neg Hx     Esophageal cancer Neg Hx      OB History    Para Term  AB Living   3 2 2   1     SAB TAB Ectopic Multiple Live Births   1              # Outcome Date GA Lbr Leonardo/2nd Weight Sex Delivery Anes PTL Lv   3  Term            2 Term            1 SAB                Current Outpatient Medications:     amLODIPine (NORVASC) 10 MG tablet, Take 1 tablet (10 mg total) by mouth once daily., Disp: 90 tablet, Rfl: 2    cholecalciferol, vitamin D3, (VITAMIN D3) 50 mcg (2,000 unit) Cap, Take 1 capsule (2,000 Units total) by mouth once daily., Disp: , Rfl:     dicyclomine (BENTYL) 10 MG capsule, Take 1 capsule (10 mg total) by mouth every 6 (six) hours as needed., Disp: 20 capsule, Rfl: 0    furosemide (LASIX) 20 MG tablet, Take 1 tablet (20 mg total) by mouth once daily., Disp: 90 tablet, Rfl: 2    hydrOXYzine HCl (ATARAX) 25 MG tablet, Take 1 tablet (25 mg total) by mouth 3 (three) times daily., Disp: 45 tablet, Rfl: 1    losartan (COZAAR) 100 MG tablet, Take 1 tablet (100 mg total) by mouth every evening., Disp: 90 tablet, Rfl: 3    metoprolol tartrate (LOPRESSOR) 25 MG tablet, Take 0.5 tablets (12.5 mg total) by mouth 2 (two) times daily., Disp: 90 tablet, Rfl: 2    spironolactone (ALDACTONE) 25 MG tablet, Take 1 tablet (25 mg total) by mouth once daily., Disp: 90 tablet, Rfl: 3    The 10-year ASCVD risk score ([place] MCCLELLAN Jr., et al., 2013) is: 5.1%    Values used to calculate the score:      Age: 55 years      Sex: Female      Is Non- : Yes      Diabetic: No      Tobacco smoker: No      Systolic Blood Pressure: 128 mmHg      Is BP treated: Yes      HDL Cholesterol: 38 mg/dL      Total Cholesterol: 130 mg/dL    Review of Systems:  General: No fever, chills, or weight loss.  Chest: No chest pain, shortness of breath, or palpitations.  Breast: No pain, masses, or nipple discharge.  Vulva: No pain, lesions, or itching.  Vagina: No relaxation. +discharge, irritation  Abdomen: No pain, nausea, vomiting, diarrhea, or constipation.  Urinary: No incontinence, nocturia, frequency, or dysuria.  Extremities:  No leg cramps, edema, or calf pain.  Neurologic: No headaches, dizziness, or visual  "changes.    Objective:     Vitals:    11/25/20 1123   BP: 126/82   Weight: 74.6 kg (164 lb 7.4 oz)   Height: 5' 4" (1.626 m)   PainSc: 0-No pain     Body mass index is 28.23 kg/m².    PHYSICAL EXAM:  APPEARANCE: Well nourished, well developed, in no acute distress.  AFFECT: WNL, alert and oriented x 3  PELVIC: Normal external genitalia without lesions.  Normal hair distribution.  Adequate perineal body, normal urethral meatus.  Vagina moist and well rugated without lesions, +thick white discharge.   EXTREMITIES: No edema.    Assessment:    Vaginosis: Treated yeast again with diflucan 2 days ago. Will repeat vaginosis and start boric acid to balance pH.  -     Vaginosis Screen by DNA Probe  -     boric acid 650mg suppository; Place 1 suppository intravaginally nightly x 1 week, then weekly as directed. refridgerate, this compound expires in 180 days  Dispense: 58.53 g; Refill: 2        Plan:   Send vaginosis screen.  Start boric acid suppositories nightly x 1 week, then reduce to once a week.  Reviewed preventative measures.  Follow up if symptoms do not continue to improve or return.    Instructed patient to call if she experiences any side effects or has any questions.        "

## 2020-11-28 LAB
CANDIDA RRNA VAG QL PROBE: POSITIVE
G VAGINALIS RRNA GENITAL QL PROBE: POSITIVE
T VAGINALIS RRNA GENITAL QL PROBE: NEGATIVE

## 2020-12-03 ENCOUNTER — PATIENT OUTREACH (OUTPATIENT)
Dept: ADMINISTRATIVE | Facility: HOSPITAL | Age: 55
End: 2020-12-03

## 2020-12-03 DIAGNOSIS — E78.5 HYPERLIPIDEMIA, UNSPECIFIED HYPERLIPIDEMIA TYPE: Primary | ICD-10-CM

## 2021-01-05 ENCOUNTER — PATIENT MESSAGE (OUTPATIENT)
Dept: ADMINISTRATIVE | Facility: HOSPITAL | Age: 56
End: 2021-01-05

## 2021-01-19 ENCOUNTER — PATIENT MESSAGE (OUTPATIENT)
Dept: INTERNAL MEDICINE | Facility: CLINIC | Age: 56
End: 2021-01-19

## 2021-01-28 ENCOUNTER — OFFICE VISIT (OUTPATIENT)
Dept: INTERNAL MEDICINE | Facility: CLINIC | Age: 56
End: 2021-01-28
Attending: INTERNAL MEDICINE
Payer: COMMERCIAL

## 2021-01-28 ENCOUNTER — LAB VISIT (OUTPATIENT)
Dept: LAB | Facility: OTHER | Age: 56
End: 2021-01-28
Attending: INTERNAL MEDICINE
Payer: COMMERCIAL

## 2021-01-28 VITALS
SYSTOLIC BLOOD PRESSURE: 131 MMHG | HEART RATE: 97 BPM | DIASTOLIC BLOOD PRESSURE: 67 MMHG | BODY MASS INDEX: 28.71 KG/M2 | HEIGHT: 64 IN | OXYGEN SATURATION: 99 % | WEIGHT: 168.19 LBS

## 2021-01-28 DIAGNOSIS — N28.0 RENAL INFARCT: ICD-10-CM

## 2021-01-28 DIAGNOSIS — E78.2 MIXED HYPERLIPIDEMIA: ICD-10-CM

## 2021-01-28 DIAGNOSIS — E21.0 PRIMARY HYPERPARATHYROIDISM: ICD-10-CM

## 2021-01-28 DIAGNOSIS — Z11.4 SCREENING FOR HIV (HUMAN IMMUNODEFICIENCY VIRUS): ICD-10-CM

## 2021-01-28 DIAGNOSIS — E78.5 HYPERLIPIDEMIA, UNSPECIFIED HYPERLIPIDEMIA TYPE: ICD-10-CM

## 2021-01-28 DIAGNOSIS — E04.2 MULTINODULAR GOITER: ICD-10-CM

## 2021-01-28 DIAGNOSIS — R73.03 PRE-DIABETES: Primary | ICD-10-CM

## 2021-01-28 DIAGNOSIS — I10 ESSENTIAL HYPERTENSION: ICD-10-CM

## 2021-01-28 DIAGNOSIS — R73.03 PRE-DIABETES: ICD-10-CM

## 2021-01-28 LAB
ALBUMIN SERPL BCP-MCNC: 4 G/DL (ref 3.5–5.2)
ALP SERPL-CCNC: 87 U/L (ref 55–135)
ALT SERPL W/O P-5'-P-CCNC: 22 U/L (ref 10–44)
ANION GAP SERPL CALC-SCNC: 7 MMOL/L (ref 8–16)
AST SERPL-CCNC: 17 U/L (ref 10–40)
BILIRUB SERPL-MCNC: 0.2 MG/DL (ref 0.1–1)
BUN SERPL-MCNC: 21 MG/DL (ref 6–20)
CALCIUM SERPL-MCNC: 9.4 MG/DL (ref 8.7–10.5)
CHLORIDE SERPL-SCNC: 105 MMOL/L (ref 95–110)
CO2 SERPL-SCNC: 29 MMOL/L (ref 23–29)
CREAT SERPL-MCNC: 1.2 MG/DL (ref 0.5–1.4)
EST. GFR  (AFRICAN AMERICAN): 58 ML/MIN/1.73 M^2
EST. GFR  (NON AFRICAN AMERICAN): 50 ML/MIN/1.73 M^2
GLUCOSE SERPL-MCNC: 86 MG/DL (ref 70–110)
POTASSIUM SERPL-SCNC: 4.1 MMOL/L (ref 3.5–5.1)
PROT SERPL-MCNC: 8.2 G/DL (ref 6–8.4)
SODIUM SERPL-SCNC: 141 MMOL/L (ref 136–145)

## 2021-01-28 PROCEDURE — 99214 PR OFFICE/OUTPT VISIT, EST, LEVL IV, 30-39 MIN: ICD-10-PCS | Mod: S$GLB,,, | Performed by: INTERNAL MEDICINE

## 2021-01-28 PROCEDURE — 3078F DIAST BP <80 MM HG: CPT | Mod: CPTII,S$GLB,, | Performed by: INTERNAL MEDICINE

## 2021-01-28 PROCEDURE — 99999 PR PBB SHADOW E&M-EST. PATIENT-LVL III: CPT | Mod: PBBFAC,,, | Performed by: INTERNAL MEDICINE

## 2021-01-28 PROCEDURE — 3075F SYST BP GE 130 - 139MM HG: CPT | Mod: CPTII,S$GLB,, | Performed by: INTERNAL MEDICINE

## 2021-01-28 PROCEDURE — 99999 PR PBB SHADOW E&M-EST. PATIENT-LVL III: ICD-10-PCS | Mod: PBBFAC,,, | Performed by: INTERNAL MEDICINE

## 2021-01-28 PROCEDURE — 99214 OFFICE O/P EST MOD 30 MIN: CPT | Mod: S$GLB,,, | Performed by: INTERNAL MEDICINE

## 2021-01-28 PROCEDURE — 3008F PR BODY MASS INDEX (BMI) DOCUMENTED: ICD-10-PCS | Mod: CPTII,S$GLB,, | Performed by: INTERNAL MEDICINE

## 2021-01-28 PROCEDURE — 36415 COLL VENOUS BLD VENIPUNCTURE: CPT

## 2021-01-28 PROCEDURE — 80061 LIPID PANEL: CPT

## 2021-01-28 PROCEDURE — 3075F PR MOST RECENT SYSTOLIC BLOOD PRESS GE 130-139MM HG: ICD-10-PCS | Mod: CPTII,S$GLB,, | Performed by: INTERNAL MEDICINE

## 2021-01-28 PROCEDURE — 83036 HEMOGLOBIN GLYCOSYLATED A1C: CPT

## 2021-01-28 PROCEDURE — 1126F AMNT PAIN NOTED NONE PRSNT: CPT | Mod: S$GLB,,, | Performed by: INTERNAL MEDICINE

## 2021-01-28 PROCEDURE — 3008F BODY MASS INDEX DOCD: CPT | Mod: CPTII,S$GLB,, | Performed by: INTERNAL MEDICINE

## 2021-01-28 PROCEDURE — 1126F PR PAIN SEVERITY QUANTIFIED, NO PAIN PRESENT: ICD-10-PCS | Mod: S$GLB,,, | Performed by: INTERNAL MEDICINE

## 2021-01-28 PROCEDURE — 3078F PR MOST RECENT DIASTOLIC BLOOD PRESSURE < 80 MM HG: ICD-10-PCS | Mod: CPTII,S$GLB,, | Performed by: INTERNAL MEDICINE

## 2021-01-28 PROCEDURE — 86703 HIV-1/HIV-2 1 RESULT ANTBDY: CPT

## 2021-01-28 PROCEDURE — 80053 COMPREHEN METABOLIC PANEL: CPT

## 2021-01-28 RX ORDER — HYDROXYZINE HYDROCHLORIDE 25 MG/1
25 TABLET, FILM COATED ORAL 3 TIMES DAILY
Qty: 45 TABLET | Refills: 1 | Status: SHIPPED | OUTPATIENT
Start: 2021-01-28 | End: 2022-05-19 | Stop reason: SDUPTHER

## 2021-01-29 LAB
CHOLEST SERPL-MCNC: 196 MG/DL (ref 120–199)
CHOLEST/HDLC SERPL: 4.9 {RATIO} (ref 2–5)
ESTIMATED AVG GLUCOSE: 114 MG/DL (ref 68–131)
HBA1C MFR BLD: 5.6 % (ref 4–5.6)
HDLC SERPL-MCNC: 40 MG/DL (ref 40–75)
HDLC SERPL: 20.4 % (ref 20–50)
HIV 1+2 AB+HIV1 P24 AG SERPL QL IA: NEGATIVE
LDLC SERPL CALC-MCNC: 135.6 MG/DL (ref 63–159)
NONHDLC SERPL-MCNC: 156 MG/DL
TRIGL SERPL-MCNC: 102 MG/DL (ref 30–150)

## 2021-02-02 ENCOUNTER — PATIENT OUTREACH (OUTPATIENT)
Dept: ADMINISTRATIVE | Facility: OTHER | Age: 56
End: 2021-02-02

## 2021-02-03 ENCOUNTER — PATIENT MESSAGE (OUTPATIENT)
Dept: INTERNAL MEDICINE | Facility: CLINIC | Age: 56
End: 2021-02-03

## 2021-02-03 ENCOUNTER — OFFICE VISIT (OUTPATIENT)
Dept: OBSTETRICS AND GYNECOLOGY | Facility: CLINIC | Age: 56
End: 2021-02-03
Attending: OBSTETRICS & GYNECOLOGY
Payer: COMMERCIAL

## 2021-02-03 VITALS
DIASTOLIC BLOOD PRESSURE: 62 MMHG | WEIGHT: 169.06 LBS | HEIGHT: 64 IN | BODY MASS INDEX: 28.86 KG/M2 | SYSTOLIC BLOOD PRESSURE: 118 MMHG

## 2021-02-03 DIAGNOSIS — Z01.419 ENCOUNTER FOR GYNECOLOGICAL EXAMINATION WITHOUT ABNORMAL FINDING: Primary | ICD-10-CM

## 2021-02-03 PROCEDURE — 3008F BODY MASS INDEX DOCD: CPT | Mod: CPTII,S$GLB,, | Performed by: OBSTETRICS & GYNECOLOGY

## 2021-02-03 PROCEDURE — 3074F PR MOST RECENT SYSTOLIC BLOOD PRESSURE < 130 MM HG: ICD-10-PCS | Mod: CPTII,S$GLB,, | Performed by: OBSTETRICS & GYNECOLOGY

## 2021-02-03 PROCEDURE — 1126F PR PAIN SEVERITY QUANTIFIED, NO PAIN PRESENT: ICD-10-PCS | Mod: S$GLB,,, | Performed by: OBSTETRICS & GYNECOLOGY

## 2021-02-03 PROCEDURE — 3078F PR MOST RECENT DIASTOLIC BLOOD PRESSURE < 80 MM HG: ICD-10-PCS | Mod: CPTII,S$GLB,, | Performed by: OBSTETRICS & GYNECOLOGY

## 2021-02-03 PROCEDURE — 99396 PR PREVENTIVE VISIT,EST,40-64: ICD-10-PCS | Mod: S$GLB,,, | Performed by: OBSTETRICS & GYNECOLOGY

## 2021-02-03 PROCEDURE — 1126F AMNT PAIN NOTED NONE PRSNT: CPT | Mod: S$GLB,,, | Performed by: OBSTETRICS & GYNECOLOGY

## 2021-02-03 PROCEDURE — 3074F SYST BP LT 130 MM HG: CPT | Mod: CPTII,S$GLB,, | Performed by: OBSTETRICS & GYNECOLOGY

## 2021-02-03 PROCEDURE — 3008F PR BODY MASS INDEX (BMI) DOCUMENTED: ICD-10-PCS | Mod: CPTII,S$GLB,, | Performed by: OBSTETRICS & GYNECOLOGY

## 2021-02-03 PROCEDURE — 99396 PREV VISIT EST AGE 40-64: CPT | Mod: S$GLB,,, | Performed by: OBSTETRICS & GYNECOLOGY

## 2021-02-03 PROCEDURE — 3078F DIAST BP <80 MM HG: CPT | Mod: CPTII,S$GLB,, | Performed by: OBSTETRICS & GYNECOLOGY

## 2021-02-12 PROBLEM — R73.03 PRE-DIABETES: Status: ACTIVE | Noted: 2021-02-12

## 2021-02-26 ENCOUNTER — LAB VISIT (OUTPATIENT)
Dept: LAB | Facility: OTHER | Age: 56
End: 2021-02-26
Attending: INTERNAL MEDICINE
Payer: COMMERCIAL

## 2021-02-26 DIAGNOSIS — N17.9 AKI (ACUTE KIDNEY INJURY): ICD-10-CM

## 2021-02-26 LAB
ANION GAP SERPL CALC-SCNC: 10 MMOL/L (ref 8–16)
BUN SERPL-MCNC: 16 MG/DL (ref 6–20)
CALCIUM SERPL-MCNC: 9.5 MG/DL (ref 8.7–10.5)
CHLORIDE SERPL-SCNC: 105 MMOL/L (ref 95–110)
CO2 SERPL-SCNC: 26 MMOL/L (ref 23–29)
CREAT SERPL-MCNC: 1 MG/DL (ref 0.5–1.4)
EST. GFR  (AFRICAN AMERICAN): >60 ML/MIN/1.73 M^2
EST. GFR  (NON AFRICAN AMERICAN): >60 ML/MIN/1.73 M^2
GLUCOSE SERPL-MCNC: 86 MG/DL (ref 70–110)
POTASSIUM SERPL-SCNC: 3.7 MMOL/L (ref 3.5–5.1)
SODIUM SERPL-SCNC: 141 MMOL/L (ref 136–145)

## 2021-02-26 PROCEDURE — 36415 COLL VENOUS BLD VENIPUNCTURE: CPT

## 2021-02-26 PROCEDURE — 80048 BASIC METABOLIC PNL TOTAL CA: CPT

## 2021-03-05 ENCOUNTER — IMMUNIZATION (OUTPATIENT)
Dept: FAMILY MEDICINE | Facility: CLINIC | Age: 56
End: 2021-03-05
Payer: COMMERCIAL

## 2021-03-05 DIAGNOSIS — Z23 NEED FOR VACCINATION: Primary | ICD-10-CM

## 2021-03-05 PROCEDURE — 91300 COVID-19, MRNA, LNP-S, PF, 30 MCG/0.3 ML DOSE VACCINE: CPT | Mod: PBBFAC | Performed by: FAMILY MEDICINE

## 2021-03-09 DIAGNOSIS — I10 ESSENTIAL HYPERTENSION: ICD-10-CM

## 2021-03-10 RX ORDER — SPIRONOLACTONE 25 MG/1
25 TABLET ORAL DAILY
Qty: 90 TABLET | Refills: 3 | Status: SHIPPED | OUTPATIENT
Start: 2021-03-10 | End: 2022-03-28 | Stop reason: SDUPTHER

## 2021-03-10 RX ORDER — METOPROLOL TARTRATE 25 MG/1
12.5 TABLET, FILM COATED ORAL 2 TIMES DAILY
Qty: 90 TABLET | Refills: 2 | Status: SHIPPED | OUTPATIENT
Start: 2021-03-10 | End: 2022-03-28 | Stop reason: SDUPTHER

## 2021-03-26 ENCOUNTER — IMMUNIZATION (OUTPATIENT)
Dept: FAMILY MEDICINE | Facility: CLINIC | Age: 56
End: 2021-03-26
Payer: COMMERCIAL

## 2021-03-26 DIAGNOSIS — Z23 NEED FOR VACCINATION: Primary | ICD-10-CM

## 2021-03-26 PROCEDURE — 91300 COVID-19, MRNA, LNP-S, PF, 30 MCG/0.3 ML DOSE VACCINE: CPT | Mod: PBBFAC | Performed by: FAMILY MEDICINE

## 2021-03-26 PROCEDURE — 0002A COVID-19, MRNA, LNP-S, PF, 30 MCG/0.3 ML DOSE VACCINE: CPT | Mod: PBBFAC | Performed by: FAMILY MEDICINE

## 2021-05-01 ENCOUNTER — PATIENT MESSAGE (OUTPATIENT)
Dept: OBSTETRICS AND GYNECOLOGY | Facility: CLINIC | Age: 56
End: 2021-05-01

## 2021-05-10 ENCOUNTER — PATIENT MESSAGE (OUTPATIENT)
Dept: ENDOCRINOLOGY | Facility: CLINIC | Age: 56
End: 2021-05-10

## 2021-09-20 ENCOUNTER — LAB VISIT (OUTPATIENT)
Dept: LAB | Facility: HOSPITAL | Age: 56
End: 2021-09-20
Attending: INTERNAL MEDICINE
Payer: COMMERCIAL

## 2021-09-20 DIAGNOSIS — M81.0 OSTEOPOROSIS, UNSPECIFIED OSTEOPOROSIS TYPE, UNSPECIFIED PATHOLOGICAL FRACTURE PRESENCE: ICD-10-CM

## 2021-09-20 DIAGNOSIS — E04.2 MULTINODULAR GOITER: ICD-10-CM

## 2021-09-20 PROCEDURE — 82306 VITAMIN D 25 HYDROXY: CPT | Performed by: INTERNAL MEDICINE

## 2021-09-20 PROCEDURE — 84443 ASSAY THYROID STIM HORMONE: CPT | Performed by: INTERNAL MEDICINE

## 2021-09-20 PROCEDURE — 36415 COLL VENOUS BLD VENIPUNCTURE: CPT | Mod: PN | Performed by: INTERNAL MEDICINE

## 2021-09-21 LAB
25(OH)D3+25(OH)D2 SERPL-MCNC: 65 NG/ML (ref 30–96)
TSH SERPL DL<=0.005 MIU/L-ACNC: 0.63 UIU/ML (ref 0.4–4)

## 2021-09-27 ENCOUNTER — OFFICE VISIT (OUTPATIENT)
Dept: ENDOCRINOLOGY | Facility: CLINIC | Age: 56
End: 2021-09-27
Payer: COMMERCIAL

## 2021-09-27 DIAGNOSIS — E04.2 MULTINODULAR GOITER: Primary | ICD-10-CM

## 2021-09-27 DIAGNOSIS — I10 ESSENTIAL HYPERTENSION: ICD-10-CM

## 2021-09-27 DIAGNOSIS — R73.02 IGT (IMPAIRED GLUCOSE TOLERANCE): ICD-10-CM

## 2021-09-27 DIAGNOSIS — M81.0 OSTEOPOROSIS, UNSPECIFIED OSTEOPOROSIS TYPE, UNSPECIFIED PATHOLOGICAL FRACTURE PRESENCE: ICD-10-CM

## 2021-09-27 PROBLEM — E21.0 PRIMARY HYPERPARATHYROIDISM: Status: RESOLVED | Noted: 2018-01-10 | Resolved: 2021-09-27

## 2021-09-27 PROCEDURE — 1159F MED LIST DOCD IN RCRD: CPT | Mod: CPTII,95,, | Performed by: INTERNAL MEDICINE

## 2021-09-27 PROCEDURE — 1159F PR MEDICATION LIST DOCUMENTED IN MEDICAL RECORD: ICD-10-PCS | Mod: CPTII,95,, | Performed by: INTERNAL MEDICINE

## 2021-09-27 PROCEDURE — 3044F HG A1C LEVEL LT 7.0%: CPT | Mod: CPTII,95,, | Performed by: INTERNAL MEDICINE

## 2021-09-27 PROCEDURE — 1160F RVW MEDS BY RX/DR IN RCRD: CPT | Mod: CPTII,95,, | Performed by: INTERNAL MEDICINE

## 2021-09-27 PROCEDURE — 3044F PR MOST RECENT HEMOGLOBIN A1C LEVEL <7.0%: ICD-10-PCS | Mod: CPTII,95,, | Performed by: INTERNAL MEDICINE

## 2021-09-27 PROCEDURE — 99214 OFFICE O/P EST MOD 30 MIN: CPT | Mod: 95,,, | Performed by: INTERNAL MEDICINE

## 2021-09-27 PROCEDURE — 4010F ACE/ARB THERAPY RXD/TAKEN: CPT | Mod: CPTII,95,, | Performed by: INTERNAL MEDICINE

## 2021-09-27 PROCEDURE — 99214 PR OFFICE/OUTPT VISIT, EST, LEVL IV, 30-39 MIN: ICD-10-PCS | Mod: 95,,, | Performed by: INTERNAL MEDICINE

## 2021-09-27 PROCEDURE — 1160F PR REVIEW ALL MEDS BY PRESCRIBER/CLIN PHARMACIST DOCUMENTED: ICD-10-PCS | Mod: CPTII,95,, | Performed by: INTERNAL MEDICINE

## 2021-09-27 PROCEDURE — 4010F PR ACE/ARB THEARPY RXD/TAKEN: ICD-10-PCS | Mod: CPTII,95,, | Performed by: INTERNAL MEDICINE

## 2021-10-04 ENCOUNTER — TELEPHONE (OUTPATIENT)
Dept: ENDOCRINOLOGY | Facility: CLINIC | Age: 56
End: 2021-10-04

## 2021-10-13 ENCOUNTER — LAB VISIT (OUTPATIENT)
Dept: LAB | Facility: HOSPITAL | Age: 56
End: 2021-10-13
Attending: INTERNAL MEDICINE
Payer: COMMERCIAL

## 2021-10-13 DIAGNOSIS — E04.2 MULTINODULAR GOITER: ICD-10-CM

## 2021-10-13 DIAGNOSIS — R73.02 IGT (IMPAIRED GLUCOSE TOLERANCE): ICD-10-CM

## 2021-10-13 LAB
ALBUMIN SERPL BCP-MCNC: 4 G/DL (ref 3.5–5.2)
ALP SERPL-CCNC: 81 U/L (ref 55–135)
ALT SERPL W/O P-5'-P-CCNC: 23 U/L (ref 10–44)
ANION GAP SERPL CALC-SCNC: 13 MMOL/L (ref 8–16)
AST SERPL-CCNC: 21 U/L (ref 10–40)
BILIRUB SERPL-MCNC: 0.3 MG/DL (ref 0.1–1)
BUN SERPL-MCNC: 16 MG/DL (ref 6–20)
CALCIUM SERPL-MCNC: 9.3 MG/DL (ref 8.7–10.5)
CHLORIDE SERPL-SCNC: 103 MMOL/L (ref 95–110)
CO2 SERPL-SCNC: 21 MMOL/L (ref 23–29)
CREAT SERPL-MCNC: 0.8 MG/DL (ref 0.5–1.4)
EST. GFR  (AFRICAN AMERICAN): >60 ML/MIN/1.73 M^2
EST. GFR  (NON AFRICAN AMERICAN): >60 ML/MIN/1.73 M^2
ESTIMATED AVG GLUCOSE: 111 MG/DL (ref 68–131)
GLUCOSE SERPL-MCNC: 90 MG/DL (ref 70–110)
HBA1C MFR BLD: 5.5 % (ref 4–5.6)
POTASSIUM SERPL-SCNC: 3.6 MMOL/L (ref 3.5–5.1)
PROT SERPL-MCNC: 7.9 G/DL (ref 6–8.4)
SODIUM SERPL-SCNC: 137 MMOL/L (ref 136–145)
TSH SERPL DL<=0.005 MIU/L-ACNC: 1.42 UIU/ML (ref 0.4–4)

## 2021-10-13 PROCEDURE — 80053 COMPREHEN METABOLIC PANEL: CPT | Performed by: INTERNAL MEDICINE

## 2021-10-13 PROCEDURE — 83036 HEMOGLOBIN GLYCOSYLATED A1C: CPT | Performed by: INTERNAL MEDICINE

## 2021-10-13 PROCEDURE — 84443 ASSAY THYROID STIM HORMONE: CPT | Performed by: INTERNAL MEDICINE

## 2021-10-13 PROCEDURE — 36415 COLL VENOUS BLD VENIPUNCTURE: CPT | Mod: PN | Performed by: INTERNAL MEDICINE

## 2021-10-23 ENCOUNTER — IMMUNIZATION (OUTPATIENT)
Dept: INTERNAL MEDICINE | Facility: CLINIC | Age: 56
End: 2021-10-23
Payer: COMMERCIAL

## 2021-10-23 DIAGNOSIS — Z23 NEED FOR VACCINATION: Primary | ICD-10-CM

## 2021-10-23 PROCEDURE — 0003A COVID-19, MRNA, LNP-S, PF, 30 MCG/0.3 ML DOSE VACCINE: CPT | Mod: CV19,PBBFAC | Performed by: INTERNAL MEDICINE

## 2021-10-23 PROCEDURE — 91300 COVID-19, MRNA, LNP-S, PF, 30 MCG/0.3 ML DOSE VACCINE: CPT | Mod: PBBFAC | Performed by: INTERNAL MEDICINE

## 2021-10-25 ENCOUNTER — HOSPITAL ENCOUNTER (OUTPATIENT)
Dept: RADIOLOGY | Facility: HOSPITAL | Age: 56
Discharge: HOME | End: 2021-10-25
Attending: INTERNAL MEDICINE
Payer: COMMERCIAL

## 2021-10-25 DIAGNOSIS — E04.2 MULTINODULAR GOITER: ICD-10-CM

## 2021-10-25 PROCEDURE — 76536 US SOFT TISSUE HEAD NECK THYROID: ICD-10-PCS | Mod: 26,,, | Performed by: RADIOLOGY

## 2021-10-25 PROCEDURE — 76536 US EXAM OF HEAD AND NECK: CPT | Mod: TC

## 2021-10-25 PROCEDURE — 76536 US EXAM OF HEAD AND NECK: CPT | Mod: 26,,, | Performed by: RADIOLOGY

## 2022-01-03 ENCOUNTER — TELEPHONE (OUTPATIENT)
Dept: OBSTETRICS AND GYNECOLOGY | Facility: CLINIC | Age: 57
End: 2022-01-03
Payer: COMMERCIAL

## 2022-01-03 DIAGNOSIS — Z12.31 ENCOUNTER FOR SCREENING MAMMOGRAM FOR MALIGNANT NEOPLASM OF BREAST: Primary | ICD-10-CM

## 2022-01-03 NOTE — TELEPHONE ENCOUNTER
----- Message from Polo Pompa sent at 1/3/2022 11:23 AM CST -----  PLEASE PUT ORDERS IN FOR PT MAMMO PT CAN BE REACHED @ [phone]

## 2022-01-06 ENCOUNTER — OFFICE VISIT (OUTPATIENT)
Dept: URGENT CARE | Facility: CLINIC | Age: 57
End: 2022-01-06
Payer: COMMERCIAL

## 2022-01-06 ENCOUNTER — HOSPITAL ENCOUNTER (OUTPATIENT)
Dept: RADIOLOGY | Facility: HOSPITAL | Age: 57
Discharge: HOME | End: 2022-01-06
Attending: OBSTETRICS & GYNECOLOGY
Payer: COMMERCIAL

## 2022-01-06 VITALS
DIASTOLIC BLOOD PRESSURE: 96 MMHG | SYSTOLIC BLOOD PRESSURE: 161 MMHG | RESPIRATION RATE: 17 BRPM | HEIGHT: 64 IN | WEIGHT: 169 LBS | TEMPERATURE: 98 F | HEART RATE: 95 BPM | BODY MASS INDEX: 28.85 KG/M2 | OXYGEN SATURATION: 97 %

## 2022-01-06 VITALS — BODY MASS INDEX: 28.85 KG/M2 | WEIGHT: 169 LBS | HEIGHT: 64 IN

## 2022-01-06 DIAGNOSIS — J02.9 SORE THROAT: ICD-10-CM

## 2022-01-06 DIAGNOSIS — Z12.31 ENCOUNTER FOR SCREENING MAMMOGRAM FOR MALIGNANT NEOPLASM OF BREAST: ICD-10-CM

## 2022-01-06 DIAGNOSIS — U07.1 COVID-19: Primary | ICD-10-CM

## 2022-01-06 LAB
CTP QC/QA: YES
SARS-COV-2 RDRP RESP QL NAA+PROBE: POSITIVE

## 2022-01-06 PROCEDURE — 3080F PR MOST RECENT DIASTOLIC BLOOD PRESSURE >= 90 MM HG: ICD-10-PCS | Mod: CPTII,S$GLB,, | Performed by: NURSE PRACTITIONER

## 2022-01-06 PROCEDURE — 77063 BREAST TOMOSYNTHESIS BI: CPT | Mod: 26,,, | Performed by: RADIOLOGY

## 2022-01-06 PROCEDURE — 1159F PR MEDICATION LIST DOCUMENTED IN MEDICAL RECORD: ICD-10-PCS | Mod: CPTII,S$GLB,, | Performed by: NURSE PRACTITIONER

## 2022-01-06 PROCEDURE — 3077F PR MOST RECENT SYSTOLIC BLOOD PRESSURE >= 140 MM HG: ICD-10-PCS | Mod: CPTII,S$GLB,, | Performed by: NURSE PRACTITIONER

## 2022-01-06 PROCEDURE — 99213 OFFICE O/P EST LOW 20 MIN: CPT | Mod: S$GLB,,, | Performed by: NURSE PRACTITIONER

## 2022-01-06 PROCEDURE — 1160F RVW MEDS BY RX/DR IN RCRD: CPT | Mod: CPTII,S$GLB,, | Performed by: NURSE PRACTITIONER

## 2022-01-06 PROCEDURE — 77063 BREAST TOMOSYNTHESIS BI: CPT | Mod: TC

## 2022-01-06 PROCEDURE — 77063 MAMMO DIGITAL SCREENING BILAT WITH TOMO: ICD-10-PCS | Mod: 26,,, | Performed by: RADIOLOGY

## 2022-01-06 PROCEDURE — 77067 SCR MAMMO BI INCL CAD: CPT | Mod: 26,,, | Performed by: RADIOLOGY

## 2022-01-06 PROCEDURE — U0002: ICD-10-PCS | Mod: QW,S$GLB,, | Performed by: NURSE PRACTITIONER

## 2022-01-06 PROCEDURE — 3080F DIAST BP >= 90 MM HG: CPT | Mod: CPTII,S$GLB,, | Performed by: NURSE PRACTITIONER

## 2022-01-06 PROCEDURE — 1160F PR REVIEW ALL MEDS BY PRESCRIBER/CLIN PHARMACIST DOCUMENTED: ICD-10-PCS | Mod: CPTII,S$GLB,, | Performed by: NURSE PRACTITIONER

## 2022-01-06 PROCEDURE — U0002 COVID-19 LAB TEST NON-CDC: HCPCS | Mod: QW,S$GLB,, | Performed by: NURSE PRACTITIONER

## 2022-01-06 PROCEDURE — 77067 SCR MAMMO BI INCL CAD: CPT | Mod: TC

## 2022-01-06 PROCEDURE — 1159F MED LIST DOCD IN RCRD: CPT | Mod: CPTII,S$GLB,, | Performed by: NURSE PRACTITIONER

## 2022-01-06 PROCEDURE — 3008F PR BODY MASS INDEX (BMI) DOCUMENTED: ICD-10-PCS | Mod: CPTII,S$GLB,, | Performed by: NURSE PRACTITIONER

## 2022-01-06 PROCEDURE — 3077F SYST BP >= 140 MM HG: CPT | Mod: CPTII,S$GLB,, | Performed by: NURSE PRACTITIONER

## 2022-01-06 PROCEDURE — 99213 PR OFFICE/OUTPT VISIT, EST, LEVL III, 20-29 MIN: ICD-10-PCS | Mod: S$GLB,,, | Performed by: NURSE PRACTITIONER

## 2022-01-06 PROCEDURE — 77067 MAMMO DIGITAL SCREENING BILAT WITH TOMO: ICD-10-PCS | Mod: 26,,, | Performed by: RADIOLOGY

## 2022-01-06 PROCEDURE — 3008F BODY MASS INDEX DOCD: CPT | Mod: CPTII,S$GLB,, | Performed by: NURSE PRACTITIONER

## 2022-01-07 NOTE — PATIENT INSTRUCTIONS
Drink plenty of fluids   Get lots of rest  Tylenol or ibuprofen for pain/fever  Mucinex DM for daytime cough  Saline nasal rinses to irrigate sinus cavities  Warm salt water gargles for sore throat  Do not take medications that contain decongestants, as these can raise your blood pressure.       Isolation:   Stay home for 5 days.  If you have no symptoms or your symptoms are resolving after 5 days, you can leave your house.  Continue to wear a mask around others for 5 additional days.  If you have a fever, continue to stay home until your fever resolves.      Patient Education       COVID-19 Discharge Instructions   About this topic   Coronavirus disease 2019 is also known as COVID-19. It is a viral illness that infects the lungs. It is caused by a virus called SARS-associated coronavirus (SARS-CoV-2).  The signs of COVID-19 most often start a few days after you have been infected. In some people, it takes longer to show signs. Others never show signs of the infection. You may have a cough, fever, shaking chills and it may be hard to breathe. You may be very tired, have muscle aches, a headache or sore throat. Some people have an upset stomach or loose stools. Others lose their sense of smell or taste. You may not have these signs all the time and they may come and go while you are sick.  The virus spreads easily through droplets when you talk, sneeze, or cough. You can pass the virus to others when you are talking close together, singing, hugging, sharing food, or shaking hands. Doctors believe the germs also survive on surfaces like tables, door handles, and telephones. However, this is not a common way that COVID-19 spreads. Doctors believe you can also spread the infection even if you dont have any symptoms, but they do not know how that happens. This is why getting vaccinated is one of the best ways to keep you healthy and slow the spread of the virus.  Some people have a mild case of COVID-19 and are able to  stay at home and away from others until they feel better. Others may need to be in the hospital if they are very sick. Some people with COVID-19 can have some symptoms for weeks or months. People with COVID-19 must isolate themselves. You can start to be around others when your doctor says it is safe to do so.       What care is needed at home?   · Ask your doctor what you need to do when you go home. Make sure you ask questions if you do not understand what the doctor says.  · Drink lots of water, juice, or broth to replace fluids lost from a fever.  · You may use cool mist humidifiers to help ease congestion and coughing.  · Use 2 to 3 pillows to prop yourself up when you lie down to make it easier to breathe and sleep.  · Do not smoke and do not drink beer, wine, and mixed drinks (alcohol).  · To lower the chance of passing the infection to others, get a COVID-19 vaccine after your infection has resolved.  · If you have not been fully vaccinated:  ? Wear a mask over your mouth and nose if you are around others who are not sick. Cloth masks work best if they have more than one layer of fabric.  ? Wash your hands often.  ? Stay home in a separate room, if possible, away from others. Only go out to get medical care.  ? Use a separate bathroom if possible.  ? Do not make food for others.  What follow-up care is needed?   · Your doctor may ask you to make visits to the office to check on your progress. Be sure to keep these visits. Make sure you wear a mask at these visits.  · If you can, tell the staff you have COVID-19 ahead of time so they can take extra care to stop the disease from spreading.  · It may take a few weeks before your health returns to normal.  What drugs may be needed?   The doctor may order drugs to:  · Help with breathing  · Help with fever  · Help with swelling in your airways and lungs  · Control coughing  · Ease a sore throat  · Help a runny or stuffy nose  Will physical activity be limited?    You may have to limit your physical activity. Talk to your doctor about the right amount of activity for you. If you have been very sick with COVID-19, it can take some time to get your strength back.  Will there be any other care needed?   Doctors do not know how long you can pass the virus on to others after you are sick. This is why it is important to stay in a separate room, if possible, when you are sick. For now, doctors are giving general guidelines for you to follow after you have been sick. Before you go around other people, you should:  · Be fever free for 24 hours without taking any drugs to lower the fever  · Have no symptoms of cough or shortness of breath  · Wait at least 10 days after first having symptoms or your first positive test, and you need to be symptom free as above. Some experts suggest waiting 20 days if you have had a more severe infection.  Talk with your doctor about getting a COVID-19 vaccine.  What problems could happen?   · Fluid loss. This is dehydration.  · Short-term or long-term lung damage  · Heart problems  · Death  When do I need to call the doctor?   · You are having so much trouble breathing that you can only say one or two words at a time.  · You need to sit upright at all times to be able to breathe and/or cannot lie down.  · You are very confused or cannot stay awake.  · Your lips or skin start to turn blue or grey.  · You think you might be having a medical emergency. Some examples of medical emergencies are:  ? Severe chest pain.  ? Not able to speak or move normally.  · You have trouble breathing when talking or sitting still.  · You have new shortness of breath.  · You become weak or dizzy.  · You have very dark urine or do not pass urine for more than 8 hours.  · You have new or worsening COVID-19 symptoms like:  ? Fever  ? Cough  ? Feeling very tired  ? Shaking chills  ? Headache  ? Trouble swallowing  ? Throwing up  ? Loose stools  ? Reddish purple spots on your  fingers or toes  Teach Back: Helping You Understand   The Teach Back Method helps you understand the information we are giving you. After you talk with the staff, tell them in your own words what you learned. This helps to make sure the staff has described each thing clearly. It also helps to explain things that may have been confusing. Before going home, make sure you can do these:  · I can tell you about my condition.  · I can tell you what may help ease my breathing.  · I can tell you what I can do to help avoid passing the infection to others.  · I can tell you what I will do if I have trouble breathing; feel sleepy or confused; or my fingertips, fingernails, skin, or lips are blue.  Where can I learn more?   Centers for Disease Control and Prevention  https://www.cdc.gov/coronavirus/2019-ncov/about/index.html   Centers for Disease Control and Prevention  https://www.cdc.gov/coronavirus/2019-ncov/hcp/disposition-in-home-patients.html   World Health Organization  https://www.who.int/news-room/q-a-detail/i-s-haapslytfmwsz   Last Reviewed Date   2021-10-05  Consumer Information Use and Disclaimer   This information is not specific medical advice and does not replace information you receive from your health care provider. This is only a brief summary of general information. It does NOT include all information about conditions, illnesses, injuries, tests, procedures, treatments, therapies, discharge instructions or life-style choices that may apply to you. You must talk with your health care provider for complete information about your health and treatment options. This information should not be used to decide whether or not to accept your health care providers advice, instructions or recommendations. Only your health care provider has the knowledge and training to provide advice that is right for you.  Copyright   Copyright © 2021 [employer] and its affiliates and/or licensors. All rights reserved.

## 2022-01-07 NOTE — PROGRESS NOTES
"Subjective:       Patient ID: Pastora Cota is a 56 y.o. female.    Vitals:  height is 5' 4" (1.626 m) and weight is 76.7 kg (169 lb). Her temperature is 97.7 °F (36.5 °C). Her blood pressure is 161/96 (abnormal) and her pulse is 95. Her respiration is 17 and oxygen saturation is 97%.     Chief Complaint: Sore Throat    This is a 56 y.o. female who presents today with a chief complaint of   Sore throat, congestion, headache, and cough. Symptoms for 3 days taking OTC decongestants for symptoms. Denies fever. No known sick contacts. Covid vaccinated and boosted    Sore Throat   This is a new problem. The current episode started in the past 7 days. The problem has been unchanged. Neither side of throat is experiencing more pain than the other. There has been no fever. The pain is at a severity of 8/10. Associated symptoms include congestion and headaches. Treatments tried: sinus meds  The treatment provided mild relief.       Constitution: Positive for chills.   HENT: Positive for congestion and sore throat.    Respiratory: Positive for sputum production.    Neurological: Positive for headaches.       Objective:      Physical Exam   Constitutional: She is oriented to person, place, and time. She appears well-developed and well-nourished. She is cooperative.  Non-toxic appearance. She does not have a sickly appearance. She does not appear ill. No distress.   HENT:   Head: Normocephalic and atraumatic.   Ears:   Right Ear: Hearing, tympanic membrane, external ear and ear canal normal.   Left Ear: Hearing, tympanic membrane, external ear and ear canal normal.   Nose: Mucosal edema present. No rhinorrhea or nasal deformity. No epistaxis. Right sinus exhibits maxillary sinus tenderness. Right sinus exhibits no frontal sinus tenderness. Left sinus exhibits maxillary sinus tenderness. Left sinus exhibits no frontal sinus tenderness.   Mouth/Throat: Uvula is midline, oropharynx is clear and moist and mucous membranes " are normal. No trismus in the jaw. Normal dentition. No uvula swelling. No oropharyngeal exudate, posterior oropharyngeal edema or posterior oropharyngeal erythema.   Eyes: Conjunctivae and lids are normal. No scleral icterus.   Neck: Trachea normal and phonation normal. Neck supple. No edema present. No erythema present. No neck rigidity present.   Cardiovascular: Normal rate, regular rhythm, normal heart sounds, intact distal pulses and normal pulses.   Pulmonary/Chest: Effort normal and breath sounds normal. No respiratory distress. She has no decreased breath sounds. She has no rhonchi.   Abdominal: Normal appearance.   Musculoskeletal: Normal range of motion.         General: No deformity or edema. Normal range of motion.   Neurological: She is alert and oriented to person, place, and time. She exhibits normal muscle tone. Coordination normal.   Skin: Skin is warm, dry, intact, not diaphoretic and not pale.   Psychiatric: She has a normal mood and affect. Her speech is normal and behavior is normal. Judgment and thought content normal. Cognition and memory  Nursing note and vitals reviewed.           Results for orders placed or performed in visit on 01/06/22   POCT COVID-19 Rapid Screening   Result Value Ref Range    POC Rapid COVID Positive (A) Negative     Acceptable Yes              Assessment:       1. COVID-19    2. Sore throat          Plan:         COVID-19    Sore throat  -     POCT COVID-19 Rapid Screening      Patient Instructions   Drink plenty of fluids   Get lots of rest  Tylenol or ibuprofen for pain/fever  Mucinex DM for daytime cough  Saline nasal rinses to irrigate sinus cavities  Warm salt water gargles for sore throat  Do not take medications that contain decongestants, as these can raise your blood pressure.       Isolation:   Stay home for 5 days.  If you have no symptoms or your symptoms are resolving after 5 days, you can leave your house.  Continue to wear a mask around  others for 5 additional days.  If you have a fever, continue to stay home until your fever resolves.      Patient Education       COVID-19 Discharge Instructions   About this topic   Coronavirus disease 2019 is also known as COVID-19. It is a viral illness that infects the lungs. It is caused by a virus called SARS-associated coronavirus (SARS-CoV-2).  The signs of COVID-19 most often start a few days after you have been infected. In some people, it takes longer to show signs. Others never show signs of the infection. You may have a cough, fever, shaking chills and it may be hard to breathe. You may be very tired, have muscle aches, a headache or sore throat. Some people have an upset stomach or loose stools. Others lose their sense of smell or taste. You may not have these signs all the time and they may come and go while you are sick.  The virus spreads easily through droplets when you talk, sneeze, or cough. You can pass the virus to others when you are talking close together, singing, hugging, sharing food, or shaking hands. Doctors believe the germs also survive on surfaces like tables, door handles, and telephones. However, this is not a common way that COVID-19 spreads. Doctors believe you can also spread the infection even if you dont have any symptoms, but they do not know how that happens. This is why getting vaccinated is one of the best ways to keep you healthy and slow the spread of the virus.  Some people have a mild case of COVID-19 and are able to stay at home and away from others until they feel better. Others may need to be in the hospital if they are very sick. Some people with COVID-19 can have some symptoms for weeks or months. People with COVID-19 must isolate themselves. You can start to be around others when your doctor says it is safe to do so.       What care is needed at home?   · Ask your doctor what you need to do when you go home. Make sure you ask questions if you do not understand  what the doctor says.  · Drink lots of water, juice, or broth to replace fluids lost from a fever.  · You may use cool mist humidifiers to help ease congestion and coughing.  · Use 2 to 3 pillows to prop yourself up when you lie down to make it easier to breathe and sleep.  · Do not smoke and do not drink beer, wine, and mixed drinks (alcohol).  · To lower the chance of passing the infection to others, get a COVID-19 vaccine after your infection has resolved.  · If you have not been fully vaccinated:  ? Wear a mask over your mouth and nose if you are around others who are not sick. Cloth masks work best if they have more than one layer of fabric.  ? Wash your hands often.  ? Stay home in a separate room, if possible, away from others. Only go out to get medical care.  ? Use a separate bathroom if possible.  ? Do not make food for others.  What follow-up care is needed?   · Your doctor may ask you to make visits to the office to check on your progress. Be sure to keep these visits. Make sure you wear a mask at these visits.  · If you can, tell the staff you have COVID-19 ahead of time so they can take extra care to stop the disease from spreading.  · It may take a few weeks before your health returns to normal.  What drugs may be needed?   The doctor may order drugs to:  · Help with breathing  · Help with fever  · Help with swelling in your airways and lungs  · Control coughing  · Ease a sore throat  · Help a runny or stuffy nose  Will physical activity be limited?   You may have to limit your physical activity. Talk to your doctor about the right amount of activity for you. If you have been very sick with COVID-19, it can take some time to get your strength back.  Will there be any other care needed?   Doctors do not know how long you can pass the virus on to others after you are sick. This is why it is important to stay in a separate room, if possible, when you are sick. For now, doctors are giving general  guidelines for you to follow after you have been sick. Before you go around other people, you should:  · Be fever free for 24 hours without taking any drugs to lower the fever  · Have no symptoms of cough or shortness of breath  · Wait at least 10 days after first having symptoms or your first positive test, and you need to be symptom free as above. Some experts suggest waiting 20 days if you have had a more severe infection.  Talk with your doctor about getting a COVID-19 vaccine.  What problems could happen?   · Fluid loss. This is dehydration.  · Short-term or long-term lung damage  · Heart problems  · Death  When do I need to call the doctor?   · You are having so much trouble breathing that you can only say one or two words at a time.  · You need to sit upright at all times to be able to breathe and/or cannot lie down.  · You are very confused or cannot stay awake.  · Your lips or skin start to turn blue or grey.  · You think you might be having a medical emergency. Some examples of medical emergencies are:  ? Severe chest pain.  ? Not able to speak or move normally.  · You have trouble breathing when talking or sitting still.  · You have new shortness of breath.  · You become weak or dizzy.  · You have very dark urine or do not pass urine for more than 8 hours.  · You have new or worsening COVID-19 symptoms like:  ? Fever  ? Cough  ? Feeling very tired  ? Shaking chills  ? Headache  ? Trouble swallowing  ? Throwing up  ? Loose stools  ? Reddish purple spots on your fingers or toes  Teach Back: Helping You Understand   The Teach Back Method helps you understand the information we are giving you. After you talk with the staff, tell them in your own words what you learned. This helps to make sure the staff has described each thing clearly. It also helps to explain things that may have been confusing. Before going home, make sure you can do these:  · I can tell you about my condition.  · I can tell you what may  help ease my breathing.  · I can tell you what I can do to help avoid passing the infection to others.  · I can tell you what I will do if I have trouble breathing; feel sleepy or confused; or my fingertips, fingernails, skin, or lips are blue.  Where can I learn more?   Centers for Disease Control and Prevention  https://www.cdc.gov/coronavirus/2019-ncov/about/index.html   Centers for Disease Control and Prevention  https://www.cdc.gov/coronavirus/2019-ncov/hcp/disposition-in-home-patients.html   World Health Organization  https://www.who.int/news-room/q-a-detail/h-q-gllpyyqvahiot   Last Reviewed Date   2021-10-05  Consumer Information Use and Disclaimer   This information is not specific medical advice and does not replace information you receive from your health care provider. This is only a brief summary of general information. It does NOT include all information about conditions, illnesses, injuries, tests, procedures, treatments, therapies, discharge instructions or life-style choices that may apply to you. You must talk with your health care provider for complete information about your health and treatment options. This information should not be used to decide whether or not to accept your health care providers advice, instructions or recommendations. Only your health care provider has the knowledge and training to provide advice that is right for you.  Copyright   Copyright © 2021 [employer] and its affiliates and/or licensors. All rights reserved.

## 2022-01-10 ENCOUNTER — TELEPHONE (OUTPATIENT)
Dept: OPTOMETRY | Facility: CLINIC | Age: 57
End: 2022-01-10
Payer: COMMERCIAL

## 2022-01-10 NOTE — TELEPHONE ENCOUNTER
----- Message from Cassidy Rachel sent at 1/10/2022 11:53 AM CST -----  Regarding: appt info  Contact: Pt  Patient has covid and her  waiting period ends tomorrow. Please contact the pt @ [phone] Patient want to know if she can still come for her appt tomorrow.

## 2022-01-20 ENCOUNTER — OFFICE VISIT (OUTPATIENT)
Dept: OPTOMETRY | Facility: CLINIC | Age: 57
End: 2022-01-20
Payer: COMMERCIAL

## 2022-01-20 DIAGNOSIS — H25.13 NUCLEAR SCLEROSIS OF BOTH EYES: ICD-10-CM

## 2022-01-20 DIAGNOSIS — H52.4 BILATERAL PRESBYOPIA: ICD-10-CM

## 2022-01-20 DIAGNOSIS — H04.123 DRY EYE SYNDROME OF BOTH EYES: Primary | ICD-10-CM

## 2022-01-20 PROCEDURE — 92014 COMPRE OPH EXAM EST PT 1/>: CPT | Mod: S$GLB,,, | Performed by: OPTOMETRIST

## 2022-01-20 PROCEDURE — 92014 PR EYE EXAM, EST PATIENT,COMPREHESV: ICD-10-PCS | Mod: S$GLB,,, | Performed by: OPTOMETRIST

## 2022-01-20 PROCEDURE — 99999 PR PBB SHADOW E&M-EST. PATIENT-LVL III: ICD-10-PCS | Mod: PBBFAC,,, | Performed by: OPTOMETRIST

## 2022-01-20 PROCEDURE — 99999 PR PBB SHADOW E&M-EST. PATIENT-LVL III: CPT | Mod: PBBFAC,,, | Performed by: OPTOMETRIST

## 2022-01-20 RX ORDER — AZITHROMYCIN 250 MG/1
TABLET, FILM COATED ORAL
COMMUNITY
Start: 2021-08-24 | End: 2022-05-19

## 2022-01-20 RX ORDER — SCOLOPAMINE TRANSDERMAL SYSTEM 1 MG/1
1 PATCH, EXTENDED RELEASE TRANSDERMAL
COMMUNITY
Start: 2021-10-26 | End: 2022-05-19

## 2022-01-20 RX ORDER — AMOXICILLIN 500 MG/1
500 CAPSULE ORAL 3 TIMES DAILY
COMMUNITY
Start: 2021-10-28 | End: 2022-05-19

## 2022-01-20 RX ORDER — INFLUENZA A VIRUS A/VICTORIA/2570/2019 IVR-215 (H1N1) ANTIGEN (FORMALDEHYDE INACTIVATED), INFLUENZA A VIRUS A/TASMANIA/503/2020 IVR-221 (H3N2) ANTIGEN (FORMALDEHYDE INACTIVATED), INFLUENZA B VIRUS B/PHUKET/3073/2013 ANTIGEN (FORMALDEHYDE INACTIVATED), AND INFLUENZA B VIRUS B/WASHINGTON/02/2019 ANTIGEN (FORMALDEHYDE INACTIVATED) 15; 15; 15; 15 UG/.5ML; UG/.5ML; UG/.5ML; UG/.5ML
INJECTION, SUSPENSION INTRAMUSCULAR
COMMUNITY
Start: 2021-10-30 | End: 2022-05-30

## 2022-01-20 RX ORDER — TRAMADOL HYDROCHLORIDE 50 MG/1
TABLET ORAL
COMMUNITY
Start: 2021-10-28 | End: 2022-05-19

## 2022-01-20 NOTE — PROGRESS NOTES
HPI     LINDA: 07/20  Chief complaint (CC): Patient hasn't noticed any vision changes.  Glasses? OTC +1.75 or +2.00  Contacts? -  H/o eye surgery, injections or laser: -  H/o eye injury: -  Known eye conditions? See above  Family h/o eye conditions? -  Eye gtts? Systane once a day      (-) Flashes (-)  Floaters (-) Mucous   (-)  Tearing (-) Itching (+) Burning   (-) Headaches (-) Eye Pain/discomfort (-) Irritation   (-)  Redness (-) Double vision (-) Blurry vision    Diabetic? -  A1c? -          Last edited by Winnie Cramer on 1/20/2022  4:03 PM. (History)            Assessment /Plan     For exam results, see Encounter Report.    Dry eye syndrome of both eyes    Bilateral presbyopia    Nuclear sclerosis of both eyes      1. Recommend Systane Ultra or Refresh Optive BID-TID OU to aid with symptoms of dry eyes.  2. Cont OTC +200-2.25  3. Nuclear sclerotic cataract - not visually significant. Observe.

## 2022-02-04 ENCOUNTER — PATIENT MESSAGE (OUTPATIENT)
Dept: ENDOCRINOLOGY | Facility: CLINIC | Age: 57
End: 2022-02-04
Payer: COMMERCIAL

## 2022-02-04 DIAGNOSIS — E04.2 MULTINODULAR GOITER: Primary | ICD-10-CM

## 2022-03-28 ENCOUNTER — OFFICE VISIT (OUTPATIENT)
Dept: OBSTETRICS AND GYNECOLOGY | Facility: CLINIC | Age: 57
End: 2022-03-28
Attending: OBSTETRICS & GYNECOLOGY
Payer: COMMERCIAL

## 2022-03-28 VITALS
HEIGHT: 64 IN | BODY MASS INDEX: 27.31 KG/M2 | DIASTOLIC BLOOD PRESSURE: 91 MMHG | HEART RATE: 86 BPM | WEIGHT: 160 LBS | SYSTOLIC BLOOD PRESSURE: 151 MMHG

## 2022-03-28 DIAGNOSIS — Z01.419 ENCOUNTER FOR GYNECOLOGICAL EXAMINATION WITHOUT ABNORMAL FINDING: Primary | ICD-10-CM

## 2022-03-28 PROCEDURE — 1159F PR MEDICATION LIST DOCUMENTED IN MEDICAL RECORD: ICD-10-PCS | Mod: CPTII,S$GLB,, | Performed by: OBSTETRICS & GYNECOLOGY

## 2022-03-28 PROCEDURE — 3077F SYST BP >= 140 MM HG: CPT | Mod: CPTII,S$GLB,, | Performed by: OBSTETRICS & GYNECOLOGY

## 2022-03-28 PROCEDURE — 3008F PR BODY MASS INDEX (BMI) DOCUMENTED: ICD-10-PCS | Mod: CPTII,S$GLB,, | Performed by: OBSTETRICS & GYNECOLOGY

## 2022-03-28 PROCEDURE — 3080F PR MOST RECENT DIASTOLIC BLOOD PRESSURE >= 90 MM HG: ICD-10-PCS | Mod: CPTII,S$GLB,, | Performed by: OBSTETRICS & GYNECOLOGY

## 2022-03-28 PROCEDURE — 4010F PR ACE/ARB THEARPY RXD/TAKEN: ICD-10-PCS | Mod: CPTII,S$GLB,, | Performed by: OBSTETRICS & GYNECOLOGY

## 2022-03-28 PROCEDURE — 99396 PR PREVENTIVE VISIT,EST,40-64: ICD-10-PCS | Mod: S$GLB,,, | Performed by: OBSTETRICS & GYNECOLOGY

## 2022-03-28 PROCEDURE — 99396 PREV VISIT EST AGE 40-64: CPT | Mod: S$GLB,,, | Performed by: OBSTETRICS & GYNECOLOGY

## 2022-03-28 PROCEDURE — 3080F DIAST BP >= 90 MM HG: CPT | Mod: CPTII,S$GLB,, | Performed by: OBSTETRICS & GYNECOLOGY

## 2022-03-28 PROCEDURE — 99999 PR PBB SHADOW E&M-EST. PATIENT-LVL III: ICD-10-PCS | Mod: PBBFAC,,, | Performed by: OBSTETRICS & GYNECOLOGY

## 2022-03-28 PROCEDURE — 4010F ACE/ARB THERAPY RXD/TAKEN: CPT | Mod: CPTII,S$GLB,, | Performed by: OBSTETRICS & GYNECOLOGY

## 2022-03-28 PROCEDURE — 3008F BODY MASS INDEX DOCD: CPT | Mod: CPTII,S$GLB,, | Performed by: OBSTETRICS & GYNECOLOGY

## 2022-03-28 PROCEDURE — 99999 PR PBB SHADOW E&M-EST. PATIENT-LVL III: CPT | Mod: PBBFAC,,, | Performed by: OBSTETRICS & GYNECOLOGY

## 2022-03-28 PROCEDURE — 3077F PR MOST RECENT SYSTOLIC BLOOD PRESSURE >= 140 MM HG: ICD-10-PCS | Mod: CPTII,S$GLB,, | Performed by: OBSTETRICS & GYNECOLOGY

## 2022-03-28 PROCEDURE — 1159F MED LIST DOCD IN RCRD: CPT | Mod: CPTII,S$GLB,, | Performed by: OBSTETRICS & GYNECOLOGY

## 2022-03-28 NOTE — PROGRESS NOTES
SUBJECTIVE:   56 y.o. female   for annual routine Pap and checkup. No LMP recorded. Patient has had a hysterectomy..  She has no unusual complaints.    She is followed by Endocrinology    Past Medical History:   Diagnosis Date    Abnormal Pap smear     repeat normal    Abnormal Pap smear of cervix     Abnormal Pap smear of vagina     Allergy     seasonal    AR (allergic rhinitis)     Hypercalcemia 9/10/2015    Hypertension     IGT (impaired glucose tolerance) 2014    Pneumonia 2016    Primary hyperparathyroidism 1/10/2018     Past Surgical History:   Procedure Laterality Date    ABDOMINAL SURGERY      CHOLECYSTECTOMY          COLONOSCOPY N/A 2016    Procedure: COLONOSCOPY;  Surgeon: Johnny Clayton MD;  Location: [facility] ENDO (4TH FLR);  Service: Endoscopy;  Laterality: N/A;    COLONOSCOPY N/A 2017    Procedure: COLONOSCOPY;  Surgeon: Johnny Clayton MD;  Location: [facility] ENDO (4TH FLR);  Service: Endoscopy;  Laterality: N/A;    COLPOSCOPY      DILATION AND CURETTAGE OF UTERUS      HYSTERECTOMY       tahbso     OOPHORECTOMY      PARATHYROIDECTOMY Right 2019    Procedure: PARATHYROIDECTOMY-with intra-op iPTH levels possible subtotal;  Surgeon: Kiko Wilson MD;  Location: [facility] OR [address] [place] [address];  Service: General;  Laterality: Right;     Social History     Socioeconomic History    Marital status:    Occupational History    Occupation:      Employer: [employer]    Tobacco Use    Smoking status: Never Smoker    Smokeless tobacco: Never Used   Substance and Sexual Activity    Alcohol use: Yes     Alcohol/week: 0.0 standard drinks     Types: 1 Standard drinks or equivalent per week     Comment: socailly    Drug use: No    Sexual activity: Yes     Partners: Male     Birth control/protection: None, See Surgical Hx     Comment: ; hysterectomy   Social History Narrative    No regular exercise     with 1 child     Social  Determinants of Health     Financial Resource Strain: Medium Risk    Difficulty of Paying Living Expenses: Somewhat hard   Food Insecurity: Food Insecurity Present    Worried About Running Out of Food in the Last Year: Sometimes true    Ran Out of Food in the Last Year: Sometimes true   Transportation Needs: No Transportation Needs    Lack of Transportation (Medical): No    Lack of Transportation (Non-Medical): No   Physical Activity: Unknown    Days of Exercise per Week: 2 days   Stress: Stress Concern Present    Feeling of Stress : Rather much   Social Connections: Unknown    Frequency of Communication with Friends and Family: Three times a week    Frequency of Social Gatherings with Friends and Family: Three times a week    Active Member of Clubs or Organizations: No    Marital Status:    Housing Stability: High Risk    Unable to Pay for Housing in the Last Year: Yes    Number of Places Lived in the Last Year: 1    Unstable Housing in the Last Year: No     Family History   Problem Relation Age of Onset    Hypertension Mother     Diabetes Mother     Glaucoma Mother     Hypertension Sister         four sisters    Breast cancer Sister 44    Glaucoma Father     No Known Problems Brother     No Known Problems Maternal Aunt     No Known Problems Maternal Uncle     No Known Problems Paternal Aunt     No Known Problems Paternal Uncle     No Known Problems Maternal Grandmother     No Known Problems Maternal Grandfather     No Known Problems Paternal Grandmother     No Known Problems Paternal Grandfather     Thyroid cancer Neg Hx     Cancer Neg Hx     Colon cancer Neg Hx     Ovarian cancer Neg Hx     Melanoma Neg Hx     Psoriasis Neg Hx     Lupus Neg Hx     Amblyopia Neg Hx     Blindness Neg Hx     Cataracts Neg Hx     Macular degeneration Neg Hx     Retinal detachment Neg Hx     Strabismus Neg Hx     Stroke Neg Hx     Thyroid disease Neg Hx     Esophageal cancer Neg Hx       OB History    Para Term  AB Living   3 2 2   1     SAB IAB Ectopic Multiple Live Births   1              # Outcome Date GA Lbr Leonardo/2nd Weight Sex Delivery Anes PTL Lv   3 Term      Vag-Spont      2 Term      Vag-Spont      1 SAB                    Current Outpatient Medications   Medication Sig Dispense Refill    amLODIPine (NORVASC) 10 MG tablet Take 1 tablet by mouth once daily 90 tablet 2    cholecalciferol, vitamin D3, (VITAMIN D3) 50 mcg (2,000 unit) Cap Take 1 capsule (2,000 Units total) by mouth once daily.      furosemide (LASIX) 20 MG tablet Take 1 tablet by mouth once daily 90 tablet 2    losartan (COZAAR) 100 MG tablet TAKE 1 TABLET BY MOUTH ONCE DAILY IN THE EVENING 90 tablet 2    metoprolol tartrate (LOPRESSOR) 25 MG tablet Take 0.5 tablets (12.5 mg total) by mouth 2 (two) times daily. 90 tablet 2    spironolactone (ALDACTONE) 25 MG tablet Take 1 tablet (25 mg total) by mouth once daily. 90 tablet 3    amoxicillin (AMOXIL) 500 MG capsule Take 500 mg by mouth 3 (three) times daily.      azithromycin (Z-BERNIE) 250 MG tablet       FLUZONE QUAD 6206-6905, PF, 60 mcg (15 mcg x 4)/0.5 mL Syrg       hydrOXYzine HCL (ATARAX) 25 MG tablet Take 1 tablet (25 mg total) by mouth 3 (three) times daily. 45 tablet 1    scopolamine (TRANSDERM-SCOP) 1.3-1.5 mg (1 mg over 3 days) 1 patch Every 3 (three) days.      traMADoL (ULTRAM) 50 mg tablet Take by mouth.       No current facility-administered medications for this visit.     Allergies: Sulfa (sulfonamide antibiotics)     The 10-year ASCVD risk score (Sebaron MCCLELLAN Jr., et al., 2013) is: 12.2%    Values used to calculate the score:      Age: 56 years      Sex: Female      Is Non- : Yes      Diabetic: No      Tobacco smoker: No      Systolic Blood Pressure: 151 mmHg      Is BP treated: Yes      HDL Cholesterol: 40 mg/dL      Total Cholesterol: 196 mg/dL      ROS:  Constitutional: no weight loss, weight gain, fever,  fatigue  Eyes:  No vision changes, glasses/contacts  ENT/Mouth: No ulcers, sinus problems, ears ringing, headache  Cardiovascular: No inability to lie flat, chest pain, exercise intolerance, swelling, heart palpitations  Respiratory: No wheezing, coughing blood, shortness of breath, or cough  Gastrointestinal: No diarrhea, bloody stool, nausea/vomiting, constipation, gas, hemorrhoids  Genitourinary: No blood in urine, painful urination, urgency of urination, frequency of urination, incomplete emptying, incontinence, abnormal bleeding, painful periods, heavy periods, vaginal discharge, vaginal odor, painful intercourse, sexual problems, bleeding after intercourse.  Musculoskeletal: No muscle weakness  Skin/Breast: No painful breasts, nipple discharge, masses, rash, ulcers  Neurological: No passing out, seizures, numbness, headache  Endocrine: No diabetes, hypothyroid, hyperthyroid, hot flashes, hair loss, abnormal hair growth, acne, +osteoporosis  Psychiatric: No depression, crying  Hematologic: No bruises, bleeding, swollen lymph nodes, anemia.      Physical Exam:   Constitutional: She is oriented to person, place, and time. She appears well-developed and well-nourished.      Neck: No tracheal deviation present. No thyromegaly present.    Cardiovascular: Exam reveals no edema.     Pulmonary/Chest: Effort normal. She exhibits no mass, no tenderness, no deformity and no retraction. Right breast exhibits no inverted nipple, no mass, no nipple discharge, no skin change, no tenderness, presence, no bleeding and no swelling. Left breast exhibits no inverted nipple, no mass, no nipple discharge, no skin change, no tenderness, presence, no bleeding and no swelling. Breasts are symmetrical.        Abdominal: Soft. She exhibits no distension and no mass. There is no abdominal tenderness. There is no rebound and no guarding. No hernia. Hernia confirmed negative in the left inguinal area.     Genitourinary: Rectum:      No  external hemorrhoid.   There is no rash, tenderness or lesion on the right labia. There is no rash, tenderness or lesion on the left labia. No no adexnal prolapse. Right adnexum displays no mass, no tenderness and no fullness. Left adnexum displays no mass, no tenderness and no fullness. Vaginal cuff normal.  No  no vaginal discharge, tenderness, bleeding, rectocele, cystocele or unspecified prolapse of vaginal walls in the vagina. Cervix is absent.Uterus is absent.           Musculoskeletal: Normal range of motion and moves all extremeties. No edema.       Neurological: She is alert and oriented to person, place, and time.    Skin: No rash noted. No erythema. No pallor.    Psychiatric: She has a normal mood and affect. Her behavior is normal. Judgment and thought content normal.         ASSESSMENT:   well woman  Family history of breast cancer  PLAN:   mammogram  return annually or prn

## 2022-05-19 ENCOUNTER — LAB VISIT (OUTPATIENT)
Dept: LAB | Facility: OTHER | Age: 57
End: 2022-05-19
Attending: INTERNAL MEDICINE
Payer: COMMERCIAL

## 2022-05-19 ENCOUNTER — OFFICE VISIT (OUTPATIENT)
Dept: INTERNAL MEDICINE | Facility: CLINIC | Age: 57
End: 2022-05-19
Attending: INTERNAL MEDICINE
Payer: COMMERCIAL

## 2022-05-19 VITALS
HEART RATE: 76 BPM | HEIGHT: 64 IN | BODY MASS INDEX: 27.59 KG/M2 | OXYGEN SATURATION: 99 % | WEIGHT: 161.63 LBS | SYSTOLIC BLOOD PRESSURE: 130 MMHG | DIASTOLIC BLOOD PRESSURE: 72 MMHG

## 2022-05-19 DIAGNOSIS — E78.2 MIXED HYPERLIPIDEMIA: ICD-10-CM

## 2022-05-19 DIAGNOSIS — M81.0 OSTEOPOROSIS, UNSPECIFIED OSTEOPOROSIS TYPE, UNSPECIFIED PATHOLOGICAL FRACTURE PRESENCE: ICD-10-CM

## 2022-05-19 DIAGNOSIS — N28.0 RENAL INFARCT: ICD-10-CM

## 2022-05-19 DIAGNOSIS — I10 HYPERTENSION, UNSPECIFIED TYPE: ICD-10-CM

## 2022-05-19 DIAGNOSIS — Z00.00 ANNUAL PHYSICAL EXAM: Primary | ICD-10-CM

## 2022-05-19 DIAGNOSIS — R73.03 PRE-DIABETES: ICD-10-CM

## 2022-05-19 DIAGNOSIS — G47.00 INSOMNIA, UNSPECIFIED TYPE: ICD-10-CM

## 2022-05-19 DIAGNOSIS — I10 ESSENTIAL HYPERTENSION: ICD-10-CM

## 2022-05-19 DIAGNOSIS — D56.3: ICD-10-CM

## 2022-05-19 DIAGNOSIS — Z00.00 ANNUAL PHYSICAL EXAM: ICD-10-CM

## 2022-05-19 LAB
25(OH)D3+25(OH)D2 SERPL-MCNC: 62 NG/ML (ref 30–96)
ALBUMIN SERPL BCP-MCNC: 4.1 G/DL (ref 3.5–5.2)
ALP SERPL-CCNC: 77 U/L (ref 55–135)
ALT SERPL W/O P-5'-P-CCNC: 20 U/L (ref 10–44)
ANION GAP SERPL CALC-SCNC: 11 MMOL/L (ref 8–16)
AST SERPL-CCNC: 19 U/L (ref 10–40)
BASOPHILS # BLD AUTO: 0.05 K/UL (ref 0–0.2)
BASOPHILS NFR BLD: 0.7 % (ref 0–1.9)
BILIRUB SERPL-MCNC: 0.2 MG/DL (ref 0.1–1)
BUN SERPL-MCNC: 22 MG/DL (ref 6–20)
CALCIUM SERPL-MCNC: 9.4 MG/DL (ref 8.7–10.5)
CHLORIDE SERPL-SCNC: 105 MMOL/L (ref 95–110)
CHOLEST SERPL-MCNC: 207 MG/DL (ref 120–199)
CHOLEST/HDLC SERPL: 4.5 {RATIO} (ref 2–5)
CO2 SERPL-SCNC: 26 MMOL/L (ref 23–29)
CREAT SERPL-MCNC: 1.1 MG/DL (ref 0.5–1.4)
DIFFERENTIAL METHOD: ABNORMAL
EOSINOPHIL # BLD AUTO: 0.2 K/UL (ref 0–0.5)
EOSINOPHIL NFR BLD: 2 % (ref 0–8)
ERYTHROCYTE [DISTWIDTH] IN BLOOD BY AUTOMATED COUNT: 14.5 % (ref 11.5–14.5)
EST. GFR  (AFRICAN AMERICAN): >60 ML/MIN/1.73 M^2
EST. GFR  (NON AFRICAN AMERICAN): 55 ML/MIN/1.73 M^2
ESTIMATED AVG GLUCOSE: 114 MG/DL (ref 68–131)
GLUCOSE SERPL-MCNC: 83 MG/DL (ref 70–110)
HBA1C MFR BLD: 5.6 % (ref 4–5.6)
HCT VFR BLD AUTO: 40.5 % (ref 37–48.5)
HDLC SERPL-MCNC: 46 MG/DL (ref 40–75)
HDLC SERPL: 22.2 % (ref 20–50)
HGB BLD-MCNC: 12.8 G/DL (ref 12–16)
IMM GRANULOCYTES # BLD AUTO: 0.02 K/UL (ref 0–0.04)
IMM GRANULOCYTES NFR BLD AUTO: 0.3 % (ref 0–0.5)
LDLC SERPL CALC-MCNC: 142.4 MG/DL (ref 63–159)
LYMPHOCYTES # BLD AUTO: 3.4 K/UL (ref 1–4.8)
LYMPHOCYTES NFR BLD: 44 % (ref 18–48)
MCH RBC QN AUTO: 24.2 PG (ref 27–31)
MCHC RBC AUTO-ENTMCNC: 31.6 G/DL (ref 32–36)
MCV RBC AUTO: 76 FL (ref 82–98)
MONOCYTES # BLD AUTO: 1 K/UL (ref 0.3–1)
MONOCYTES NFR BLD: 13.6 % (ref 4–15)
NEUTROPHILS # BLD AUTO: 3 K/UL (ref 1.8–7.7)
NEUTROPHILS NFR BLD: 39.4 % (ref 38–73)
NONHDLC SERPL-MCNC: 161 MG/DL
NRBC BLD-RTO: 0 /100 WBC
PLATELET # BLD AUTO: 283 K/UL (ref 150–450)
PMV BLD AUTO: 10 FL (ref 9.2–12.9)
POTASSIUM SERPL-SCNC: 3.9 MMOL/L (ref 3.5–5.1)
PROT SERPL-MCNC: 8.3 G/DL (ref 6–8.4)
RBC # BLD AUTO: 5.3 M/UL (ref 4–5.4)
SODIUM SERPL-SCNC: 142 MMOL/L (ref 136–145)
TRIGL SERPL-MCNC: 93 MG/DL (ref 30–150)
TSH SERPL DL<=0.005 MIU/L-ACNC: 0.85 UIU/ML (ref 0.4–4)
WBC # BLD AUTO: 7.63 K/UL (ref 3.9–12.7)

## 2022-05-19 PROCEDURE — 80053 COMPREHEN METABOLIC PANEL: CPT | Performed by: INTERNAL MEDICINE

## 2022-05-19 PROCEDURE — 1160F RVW MEDS BY RX/DR IN RCRD: CPT | Mod: CPTII,S$GLB,, | Performed by: INTERNAL MEDICINE

## 2022-05-19 PROCEDURE — 99999 PR PBB SHADOW E&M-EST. PATIENT-LVL III: ICD-10-PCS | Mod: PBBFAC,,, | Performed by: INTERNAL MEDICINE

## 2022-05-19 PROCEDURE — 1159F PR MEDICATION LIST DOCUMENTED IN MEDICAL RECORD: ICD-10-PCS | Mod: CPTII,S$GLB,, | Performed by: INTERNAL MEDICINE

## 2022-05-19 PROCEDURE — 4010F PR ACE/ARB THEARPY RXD/TAKEN: ICD-10-PCS | Mod: CPTII,S$GLB,, | Performed by: INTERNAL MEDICINE

## 2022-05-19 PROCEDURE — 3078F DIAST BP <80 MM HG: CPT | Mod: CPTII,S$GLB,, | Performed by: INTERNAL MEDICINE

## 2022-05-19 PROCEDURE — 1159F MED LIST DOCD IN RCRD: CPT | Mod: CPTII,S$GLB,, | Performed by: INTERNAL MEDICINE

## 2022-05-19 PROCEDURE — 3075F SYST BP GE 130 - 139MM HG: CPT | Mod: CPTII,S$GLB,, | Performed by: INTERNAL MEDICINE

## 2022-05-19 PROCEDURE — 84443 ASSAY THYROID STIM HORMONE: CPT | Performed by: INTERNAL MEDICINE

## 2022-05-19 PROCEDURE — 3008F PR BODY MASS INDEX (BMI) DOCUMENTED: ICD-10-PCS | Mod: CPTII,S$GLB,, | Performed by: INTERNAL MEDICINE

## 2022-05-19 PROCEDURE — 85025 COMPLETE CBC W/AUTO DIFF WBC: CPT | Performed by: INTERNAL MEDICINE

## 2022-05-19 PROCEDURE — 80061 LIPID PANEL: CPT | Performed by: INTERNAL MEDICINE

## 2022-05-19 PROCEDURE — 99999 PR PBB SHADOW E&M-EST. PATIENT-LVL III: CPT | Mod: PBBFAC,,, | Performed by: INTERNAL MEDICINE

## 2022-05-19 PROCEDURE — 3008F BODY MASS INDEX DOCD: CPT | Mod: CPTII,S$GLB,, | Performed by: INTERNAL MEDICINE

## 2022-05-19 PROCEDURE — 1160F PR REVIEW ALL MEDS BY PRESCRIBER/CLIN PHARMACIST DOCUMENTED: ICD-10-PCS | Mod: CPTII,S$GLB,, | Performed by: INTERNAL MEDICINE

## 2022-05-19 PROCEDURE — 3044F PR MOST RECENT HEMOGLOBIN A1C LEVEL <7.0%: ICD-10-PCS | Mod: CPTII,S$GLB,, | Performed by: INTERNAL MEDICINE

## 2022-05-19 PROCEDURE — 99396 PREV VISIT EST AGE 40-64: CPT | Mod: S$GLB,,, | Performed by: INTERNAL MEDICINE

## 2022-05-19 PROCEDURE — 83036 HEMOGLOBIN GLYCOSYLATED A1C: CPT | Performed by: INTERNAL MEDICINE

## 2022-05-19 PROCEDURE — 3078F PR MOST RECENT DIASTOLIC BLOOD PRESSURE < 80 MM HG: ICD-10-PCS | Mod: CPTII,S$GLB,, | Performed by: INTERNAL MEDICINE

## 2022-05-19 PROCEDURE — 82306 VITAMIN D 25 HYDROXY: CPT | Performed by: INTERNAL MEDICINE

## 2022-05-19 PROCEDURE — 3075F PR MOST RECENT SYSTOLIC BLOOD PRESS GE 130-139MM HG: ICD-10-PCS | Mod: CPTII,S$GLB,, | Performed by: INTERNAL MEDICINE

## 2022-05-19 PROCEDURE — 3044F HG A1C LEVEL LT 7.0%: CPT | Mod: CPTII,S$GLB,, | Performed by: INTERNAL MEDICINE

## 2022-05-19 PROCEDURE — 36415 COLL VENOUS BLD VENIPUNCTURE: CPT | Performed by: INTERNAL MEDICINE

## 2022-05-19 PROCEDURE — 4010F ACE/ARB THERAPY RXD/TAKEN: CPT | Mod: CPTII,S$GLB,, | Performed by: INTERNAL MEDICINE

## 2022-05-19 PROCEDURE — 99396 PR PREVENTIVE VISIT,EST,40-64: ICD-10-PCS | Mod: S$GLB,,, | Performed by: INTERNAL MEDICINE

## 2022-05-19 RX ORDER — HYDROXYZINE HYDROCHLORIDE 25 MG/1
25 TABLET, FILM COATED ORAL 3 TIMES DAILY
Qty: 45 TABLET | Refills: 1 | Status: SHIPPED | OUTPATIENT
Start: 2022-05-19

## 2022-05-19 NOTE — PROGRESS NOTES
"Subjective:       Patient ID: Pastora Cota is a 56 y.o. female.    Chief Complaint: Annual Exam    Here for annual exam    Feeling anxious. Concerned about having a stroke one day. This is not based off of any symptoms but more her hx of HTN and family Hx.      Patient presents today for routine evaluation, physical, and labs. Patient has no major concerns or complaints today.           Review of Systems   Constitutional: Negative for chills, fatigue, fever and unexpected weight change.   HENT: Negative for ear pain, hearing loss, postnasal drip, tinnitus, trouble swallowing and voice change.    Respiratory: Negative for cough, chest tightness, shortness of breath and wheezing.    Cardiovascular: Negative for chest pain, palpitations and leg swelling.   Gastrointestinal: Negative for abdominal pain, blood in stool, diarrhea, nausea and vomiting.   Endocrine: Negative for polydipsia, polyphagia and polyuria.   Genitourinary: Negative for difficulty urinating, dysuria, hematuria and vaginal bleeding.   Skin: Negative for rash.   Allergic/Immunologic: Negative for food allergies.   Neurological: Negative for dizziness, numbness and headaches.   Hematological: Does not bruise/bleed easily.   Psychiatric/Behavioral: The patient is not nervous/anxious.        Objective:      Vitals:    05/19/22 1558   BP: 130/72   BP Location: Left arm   Patient Position: Sitting   BP Method: Large (Manual)   Pulse: 76   SpO2: 99%   Weight: 73.3 kg (161 lb 9.6 oz)   Height: 5' 4" (1.626 m)      Physical Exam  Constitutional:       General: She is not in acute distress.     Appearance: She is well-developed.   HENT:      Head: Normocephalic and atraumatic.      Mouth/Throat:      Pharynx: No oropharyngeal exudate.   Eyes:      General: No scleral icterus.     Conjunctiva/sclera: Conjunctivae normal.      Pupils: Pupils are equal, round, and reactive to light.   Neck:      Thyroid: No thyromegaly.   Cardiovascular:      Rate and " Rhythm: Normal rate and regular rhythm.      Heart sounds: Normal heart sounds. No murmur heard.  Pulmonary:      Effort: Pulmonary effort is normal.      Breath sounds: Normal breath sounds. No wheezing or rales.   Abdominal:      General: There is no distension.      Palpations: Abdomen is soft.      Tenderness: There is no abdominal tenderness.   Musculoskeletal:         General: No tenderness.   Lymphadenopathy:      Cervical: No cervical adenopathy.   Skin:     General: Skin is warm and dry.   Neurological:      Mental Status: She is alert and oriented to person, place, and time.   Psychiatric:         Behavior: Behavior normal.         Assessment:       1. Annual physical exam    2. Mixed hyperlipidemia    3. Essential hypertension    4. Pre-diabetes    5. Alpha thalassemia 2    6. Osteoporosis, unspecified osteoporosis type, unspecified pathological fracture presence    7. Renal infarct    8. Insomnia, unspecified type        Plan:       Pastora was seen today for annual exam.    Diagnoses and all orders for this visit:    Annual physical exam  -     Comprehensive Metabolic Panel; Future  -     Lipid Panel; Future  -     TSH; Future  -     CBC Auto Differential; Future  -     Hemoglobin A1C; Future    Mixed hyperlipidemia   Tolerating statin. Continue this.     Essential hypertension   Controlled and asymptomatic.  Continue current Rx regimen.    Pre-diabetes   Update A1c    Alpha thalassemia 2    Osteoporosis, unspecified osteoporosis type, unspecified pathological fracture presence  -     Vitamin D; Future    Renal infarct    Insomnia, unspecified type  -     hydrOXYzine HCL (ATARAX) 25 MG tablet; Take 1 tablet (25 mg total) by mouth 3 (three) times daily.           Kiko Gutierrez MD  Internal Medicine-[facility]        Side effects of medication(s) were discussed in detail and patient voiced understanding.  Patient will call back for any issues or complications.

## 2022-05-19 NOTE — TELEPHONE ENCOUNTER
No new care gaps identified.  [facility] Embedded Care Gaps. Reference number: [id number]. 5/19/2022   4:07:35 PM CDT

## 2022-05-20 RX ORDER — FUROSEMIDE 20 MG/1
20 TABLET ORAL DAILY
Qty: 90 TABLET | Refills: 2 | Status: SHIPPED | OUTPATIENT
Start: 2022-05-20 | End: 2023-02-07 | Stop reason: SDUPTHER

## 2022-05-20 RX ORDER — LOSARTAN POTASSIUM 100 MG/1
100 TABLET ORAL NIGHTLY
Qty: 90 TABLET | Refills: 2 | Status: SHIPPED | OUTPATIENT
Start: 2022-05-20 | End: 2023-02-07 | Stop reason: SDUPTHER

## 2022-05-20 RX ORDER — AMLODIPINE BESYLATE 10 MG/1
10 TABLET ORAL DAILY
Qty: 90 TABLET | Refills: 2 | Status: SHIPPED | OUTPATIENT
Start: 2022-05-20 | End: 2023-02-07 | Stop reason: SDUPTHER

## 2022-08-04 ENCOUNTER — IMMUNIZATION (OUTPATIENT)
Dept: INTERNAL MEDICINE | Facility: CLINIC | Age: 57
End: 2022-08-04
Payer: COMMERCIAL

## 2022-08-04 DIAGNOSIS — Z23 NEED FOR VACCINATION: Primary | ICD-10-CM

## 2022-08-04 PROCEDURE — 0054A COVID-19, MRNA, LNP-S, PF, 30 MCG/0.3 ML DOSE VACCINE (PFIZER): CPT | Mod: PBBFAC | Performed by: INTERNAL MEDICINE

## 2022-09-12 NOTE — PROGRESS NOTES
Subjective:      Patient ID: Pastora Cota is a 53 y.o. female.    Chief Complaint:  Osteoporosis      History of Present Illness  With regards to the MNG    In 2011 she presented with palpitations   She had a ct of the chest  The goiter was noted at that time  She had an u/s done and was advised to f/u     Last thyroid ultrasound 4/25/19    COMPARISON:  Neck ultrasound dated 9/2/2015.  When compared to the prior study an additional nodule is now noted in the left inferior pole.      Impression       1.)  Thyroid gland is normal in size with homogeneous echotexture and normal vascularity    2.) 0.68 x 0.49 x 0.63 cm solid, heterogeneous predominately hypoechoic nodule seen in the right inferior pole    3.) 0.93 x 0.48 x 0.66 cm hypoechoic lesion seen posterior the mid lobe of the thyroid which could represent a parathyroid adenoma    4.) 1.50 x 0.59 x 1.07 cm colloid-containing cyst seen in the left superior pole    5.) 1.32 x 0.78 x 1.09 cm solid, heterogeneous predominately hypoechoic nodule seen in the left inferior pole    RECOMMENDATIONS:  Recommend FNA of left inferior pole nodule.      fna done 6/5/19  THYROID, LEFT INFERIOR NODULE, FINE-NEEDLE ASPIRATION:  Satisfactory for interpretation  [place] system thyroid cytology category: Benign  Benign follicular epithelial cells, abundant macrophages and watery colloid consistent with benign colloid nodule  with cystic degeneration    she does not notice the goiter  No voice changes, no difficulty swallowing or breathing  no FH of thyroid disease or disease   no h/o radiation treatment or exposure  Did have light therapy for acne        With regards to Primary hyperparathyroidism  She has episodic hypercalcemia     She takes 1200 mg calcium daily plus 3-4 dairy servings.  She is  off HCTZ.  Denies constipation, bone pain, weakness.       With regards to the Osteoposoris   newly dx on bmd 4/25/19  Impression       Osteoporosis of the distal 1/3 forearm       noted that there also has been l spine ahd hip vone loss when compared to the last study     No recent falls or fractures        With regards to   Body mass index is 27.81 kg/m².,  igt    Denies symptoms of hyperglycemia such as polyuria, polydipsia, nocturia, unexplained weight loss or blurred vision           Review of Systems   Constitutional: Negative for unexpected weight change.   Eyes: Negative for visual disturbance.   Respiratory: Negative for shortness of breath.    Cardiovascular: Negative for chest pain.   Gastrointestinal: Negative for abdominal pain.   Musculoskeletal: Negative for arthralgias.   Skin: Negative for wound.   Neurological: Negative for headaches.   Hematological: Does not bruise/bleed easily.   Psychiatric/Behavioral: Negative for sleep disturbance.       Objective:   Physical Exam   Neck: Thyromegaly present.   Cardiovascular: Normal rate.   Pulmonary/Chest: Effort normal.   Abdominal: Soft.   Musculoskeletal: She exhibits no edema.   Lymphadenopathy:     She has no cervical adenopathy.   Vitals reviewed.      Body mass index is 27.81 kg/m².    Lab Review:   Lab Results   Component Value Date    HGBA1C 5.5 04/07/2019     Lab Results   Component Value Date    CHOL 130 04/07/2019    HDL 38 (L) 04/07/2019    LDLCALC 85.6 04/07/2019    TRIG 32 04/07/2019    CHOLHDL 29.2 04/07/2019     Lab Results   Component Value Date     07/05/2019    K 4.8 07/05/2019     07/05/2019    CO2 27 07/05/2019    GLU 88 07/05/2019    BUN 22 (H) 07/05/2019    CREATININE 1.0 07/05/2019    CALCIUM 11.0 (H) 07/05/2019    PROT 8.3 07/05/2019    ALBUMIN 4.3 07/05/2019    BILITOT 0.3 07/05/2019    ALKPHOS 112 07/05/2019    AST 14 07/05/2019    ALT 17 07/05/2019    ANIONGAP 7 (L) 07/05/2019    ESTGFRAFRICA >60 07/05/2019    EGFRNONAA >60 07/05/2019    TSH 0.906 07/05/2019       Assessment and Plan     Primary hyperparathyroidism   Check 4D CT     Reviewed surgical indications   Her indication is  osteoporosis   Avoid hctz      Refer to Dr Wilson     Osteoporosis  Address above   If surgery is not opted for consider bisphosphonate therapy     Essential hypertension  Follow at home       Multinodular goiter  Follow      Discussed indications for a FNA      Discussed  surgical indications (abnormal FNA, compressive symptoms or interval change)           96

## 2022-10-27 ENCOUNTER — IMMUNIZATION (OUTPATIENT)
Dept: PHARMACY | Facility: CLINIC | Age: 57
End: 2022-10-27
Payer: COMMERCIAL

## 2022-10-28 ENCOUNTER — TELEPHONE (OUTPATIENT)
Dept: ENDOCRINOLOGY | Facility: CLINIC | Age: 57
End: 2022-10-28
Payer: COMMERCIAL

## 2022-10-28 ENCOUNTER — TELEPHONE (OUTPATIENT)
Dept: OBSTETRICS AND GYNECOLOGY | Facility: CLINIC | Age: 57
End: 2022-10-28
Payer: COMMERCIAL

## 2022-10-28 DIAGNOSIS — Z12.31 ENCOUNTER FOR SCREENING MAMMOGRAM FOR MALIGNANT NEOPLASM OF BREAST: Primary | ICD-10-CM

## 2022-10-28 NOTE — TELEPHONE ENCOUNTER
----- Message from Marleen Lo sent at 10/28/2022  1:12 PM CDT -----    Name of Who is Calling: SID GARCIA [[id number]]      What is the request in detail: Pt is requesting a call back to schedule her annual.       Can the clinic reply by [facility]: No      What Number to Call Back if not in [facility]: [phone]

## 2022-10-28 NOTE — TELEPHONE ENCOUNTER
----- Message from Stacie Linda sent at 10/28/2022 11:41 AM CDT -----  Pastora Singleton calling regarding Appointment Access  (message) for f/u thyroids, call back [phone]..the patient is also requesting orders for labs

## 2022-10-28 NOTE — TELEPHONE ENCOUNTER
Pt called to schedule her annual. Advised pt she was due in March. Put pt on the list to call once schedule opens. Ordered mammogram so pt can schedule

## 2023-01-10 ENCOUNTER — HOSPITAL ENCOUNTER (OUTPATIENT)
Dept: RADIOLOGY | Facility: OTHER | Age: 58
Discharge: HOME | End: 2023-01-10
Attending: OBSTETRICS & GYNECOLOGY
Payer: COMMERCIAL

## 2023-01-10 VITALS — WEIGHT: 161 LBS | BODY MASS INDEX: 27.64 KG/M2

## 2023-01-10 DIAGNOSIS — Z12.31 ENCOUNTER FOR SCREENING MAMMOGRAM FOR MALIGNANT NEOPLASM OF BREAST: ICD-10-CM

## 2023-01-10 PROCEDURE — 77067 MAMMO DIGITAL SCREENING BILAT WITH TOMO: ICD-10-PCS | Mod: 26,,, | Performed by: INTERNAL MEDICINE

## 2023-01-10 PROCEDURE — 77063 BREAST TOMOSYNTHESIS BI: CPT | Mod: 26,,, | Performed by: INTERNAL MEDICINE

## 2023-01-10 PROCEDURE — 77067 SCR MAMMO BI INCL CAD: CPT | Mod: TC

## 2023-01-10 PROCEDURE — 77067 SCR MAMMO BI INCL CAD: CPT | Mod: 26,,, | Performed by: INTERNAL MEDICINE

## 2023-01-10 PROCEDURE — 77063 MAMMO DIGITAL SCREENING BILAT WITH TOMO: ICD-10-PCS | Mod: 26,,, | Performed by: INTERNAL MEDICINE

## 2023-01-19 ENCOUNTER — TELEPHONE (OUTPATIENT)
Dept: OBSTETRICS AND GYNECOLOGY | Facility: CLINIC | Age: 58
End: 2023-01-19
Payer: COMMERCIAL

## 2023-01-20 ENCOUNTER — TELEPHONE (OUTPATIENT)
Dept: OBSTETRICS AND GYNECOLOGY | Facility: CLINIC | Age: 58
End: 2023-01-20
Payer: COMMERCIAL

## 2023-01-20 NOTE — TELEPHONE ENCOUNTER
----- Message from Paige Barboza sent at 1/20/2023 12:33 PM CST -----  Contact: SID GARCIA [[id number]]  Type:  Patient Returning Call      Who Called:  SID GARCIA [[id number]]      Who Left Message for Patient: Luly Kellogg MA      Does the patient know what this is regarding?: No      Would the patient rather a call back or a response via [facility]?  Call back       Best Call Back Number: [phone] (home)       Additional Information:

## 2023-02-09 ENCOUNTER — PATIENT MESSAGE (OUTPATIENT)
Dept: ENDOCRINOLOGY | Facility: CLINIC | Age: 58
End: 2023-02-09
Payer: COMMERCIAL

## 2023-02-09 DIAGNOSIS — M81.0 OSTEOPOROSIS, UNSPECIFIED OSTEOPOROSIS TYPE, UNSPECIFIED PATHOLOGICAL FRACTURE PRESENCE: ICD-10-CM

## 2023-02-09 DIAGNOSIS — E04.2 MULTINODULAR GOITER: Primary | ICD-10-CM

## 2023-02-09 DIAGNOSIS — R73.02 IGT (IMPAIRED GLUCOSE TOLERANCE): ICD-10-CM

## 2023-02-13 ENCOUNTER — TELEPHONE (OUTPATIENT)
Dept: ENDOCRINOLOGY | Facility: CLINIC | Age: 58
End: 2023-02-13
Payer: COMMERCIAL

## 2023-02-14 ENCOUNTER — LAB VISIT (OUTPATIENT)
Dept: LAB | Facility: HOSPITAL | Age: 58
End: 2023-02-14
Attending: INTERNAL MEDICINE
Payer: COMMERCIAL

## 2023-02-14 DIAGNOSIS — R73.02 IGT (IMPAIRED GLUCOSE TOLERANCE): ICD-10-CM

## 2023-02-14 LAB
ALBUMIN SERPL BCP-MCNC: 4 G/DL (ref 3.5–5.2)
ALP SERPL-CCNC: 88 U/L (ref 55–135)
ALT SERPL W/O P-5'-P-CCNC: 19 U/L (ref 10–44)
ANION GAP SERPL CALC-SCNC: 11 MMOL/L (ref 8–16)
AST SERPL-CCNC: 19 U/L (ref 10–40)
BILIRUB SERPL-MCNC: 0.3 MG/DL (ref 0.1–1)
BUN SERPL-MCNC: 16 MG/DL (ref 6–20)
CALCIUM SERPL-MCNC: 9.3 MG/DL (ref 8.7–10.5)
CHLORIDE SERPL-SCNC: 106 MMOL/L (ref 95–110)
CO2 SERPL-SCNC: 24 MMOL/L (ref 23–29)
CREAT SERPL-MCNC: 0.9 MG/DL (ref 0.5–1.4)
EST. GFR  (NO RACE VARIABLE): >60 ML/MIN/1.73 M^2
ESTIMATED AVG GLUCOSE: 117 MG/DL (ref 68–131)
GLUCOSE SERPL-MCNC: 89 MG/DL (ref 70–110)
HBA1C MFR BLD: 5.7 % (ref 4–5.6)
POTASSIUM SERPL-SCNC: 3.6 MMOL/L (ref 3.5–5.1)
PROT SERPL-MCNC: 7.9 G/DL (ref 6–8.4)
SODIUM SERPL-SCNC: 141 MMOL/L (ref 136–145)
TSH SERPL DL<=0.005 MIU/L-ACNC: 0.87 UIU/ML (ref 0.4–4)

## 2023-02-14 PROCEDURE — 36415 COLL VENOUS BLD VENIPUNCTURE: CPT | Mod: PN | Performed by: INTERNAL MEDICINE

## 2023-02-14 PROCEDURE — 83036 HEMOGLOBIN GLYCOSYLATED A1C: CPT | Performed by: INTERNAL MEDICINE

## 2023-02-14 PROCEDURE — 80053 COMPREHEN METABOLIC PANEL: CPT | Performed by: INTERNAL MEDICINE

## 2023-02-14 PROCEDURE — 84443 ASSAY THYROID STIM HORMONE: CPT | Performed by: INTERNAL MEDICINE

## 2023-02-15 ENCOUNTER — TELEPHONE (OUTPATIENT)
Dept: ENDOCRINOLOGY | Facility: CLINIC | Age: 58
End: 2023-02-15

## 2023-02-15 ENCOUNTER — OFFICE VISIT (OUTPATIENT)
Dept: ENDOCRINOLOGY | Facility: CLINIC | Age: 58
End: 2023-02-15
Payer: COMMERCIAL

## 2023-02-15 DIAGNOSIS — E04.2 MULTINODULAR GOITER: Primary | ICD-10-CM

## 2023-02-15 DIAGNOSIS — M81.0 OSTEOPOROSIS, UNSPECIFIED OSTEOPOROSIS TYPE, UNSPECIFIED PATHOLOGICAL FRACTURE PRESENCE: ICD-10-CM

## 2023-02-15 DIAGNOSIS — R73.02 IGT (IMPAIRED GLUCOSE TOLERANCE): ICD-10-CM

## 2023-02-15 PROCEDURE — 3044F PR MOST RECENT HEMOGLOBIN A1C LEVEL <7.0%: ICD-10-PCS | Mod: CPTII,95,, | Performed by: INTERNAL MEDICINE

## 2023-02-15 PROCEDURE — 99214 OFFICE O/P EST MOD 30 MIN: CPT | Mod: 95,,, | Performed by: INTERNAL MEDICINE

## 2023-02-15 PROCEDURE — 3044F HG A1C LEVEL LT 7.0%: CPT | Mod: CPTII,95,, | Performed by: INTERNAL MEDICINE

## 2023-02-15 PROCEDURE — 1159F PR MEDICATION LIST DOCUMENTED IN MEDICAL RECORD: ICD-10-PCS | Mod: CPTII,95,, | Performed by: INTERNAL MEDICINE

## 2023-02-15 PROCEDURE — 4010F PR ACE/ARB THEARPY RXD/TAKEN: ICD-10-PCS | Mod: CPTII,95,, | Performed by: INTERNAL MEDICINE

## 2023-02-15 PROCEDURE — 4010F ACE/ARB THERAPY RXD/TAKEN: CPT | Mod: CPTII,95,, | Performed by: INTERNAL MEDICINE

## 2023-02-15 PROCEDURE — 1160F PR REVIEW ALL MEDS BY PRESCRIBER/CLIN PHARMACIST DOCUMENTED: ICD-10-PCS | Mod: CPTII,95,, | Performed by: INTERNAL MEDICINE

## 2023-02-15 PROCEDURE — 99214 PR OFFICE/OUTPT VISIT, EST, LEVL IV, 30-39 MIN: ICD-10-PCS | Mod: 95,,, | Performed by: INTERNAL MEDICINE

## 2023-02-15 PROCEDURE — 1160F RVW MEDS BY RX/DR IN RCRD: CPT | Mod: CPTII,95,, | Performed by: INTERNAL MEDICINE

## 2023-02-15 PROCEDURE — 1159F MED LIST DOCD IN RCRD: CPT | Mod: CPTII,95,, | Performed by: INTERNAL MEDICINE

## 2023-02-15 NOTE — PROGRESS NOTES
Subjective:      Patient ID: Pastora Cota is a 57 y.o. female.    Chief Complaint:  MNG and osteoporosis      History of Present Illness     With regards to the MNG    In 2011 she presented with palpitations   She had a ct of the chest  The goiter was noted at that time  She had an u/s done and was advised to f/u     Last thyroid ultrasound  10/25/21  Impression:     Interval increased size of left inferior pole nodule, as above, previously found to be benign on biopsy.  Additional small left thyroid nodules noted, meeting ACR criteria for 1 year follow-up, detailed above.         fna done 6/5/19    THYROID, LEFT INFERIOR NODULE, FINE-NEEDLE ASPIRATION:  Satisfactory for interpretation  [place] system thyroid cytology category: Benign  Benign follicular epithelial cells, abundant macrophages and watery colloid consistent with benign colloid nodule  with cystic degeneration      fna done 8/26/20    Thyroid, left inferior (fine needle aspiration):   [facility] Thyroid Cytology Category: Benign  Consistent with a benign   follicular nodule.   Other findings and comments:   Benign follicular epithelial cells.   Blood present.   Colloid present.       she does not notice the goiter  No voice changes, no difficulty swallowing or breathing  no FH of thyroid disease or disease   no h/o radiation treatment or exposure  Did have light therapy for acne         With regards to   igt  ....   Denies symptoms of hyperglycemia such as polyuria, polydipsia, nocturia, unexplained weight loss or blurred vision    Weight is stable           With regards to the Osteoporosis     dx on bmd 4/25/19  Impression       Osteoporosis of the distal 1/3 forearm       Last bmd 7/24/20  Impression:     1. Osteoporosis  2. Compared with previous DXA, BMD at the lumbar spine has remained stable, BMD at the left forearm has declined by 5.1%, and the BMD at the total hip has increased by 3.7%.      No recent falls or fractures     With  regards to her h/o Primary hyperparathyroidism       s/p right inferior parathyroidectomy on 12/11/19 for primary hyperparathyroidism - Dr Wilson          Review of Systems  As above     Objective:   Physical Exam  Psychiatric:         Attention and Perception: Attention normal.         Mood and Affect: Mood normal.         Speech: Speech normal.         Behavior: Behavior normal.         Thought Content: Thought content normal.         Judgment: Judgment normal.       There is no height or weight on file to calculate BMI.    Lab Review:   Lab Results   Component Value Date    HGBA1C 5.7 (H) 02/14/2023     Lab Results   Component Value Date    CHOL 207 (H) 05/19/2022    HDL 46 05/19/2022    LDLCALC 142.4 05/19/2022    TRIG 93 05/19/2022    CHOLHDL 22.2 05/19/2022     Lab Results   Component Value Date     02/14/2023    K 3.6 02/14/2023     02/14/2023    CO2 24 02/14/2023    GLU 89 02/14/2023    BUN 16 02/14/2023    CREATININE 0.9 02/14/2023    CALCIUM 9.3 02/14/2023    PROT 7.9 02/14/2023    ALBUMIN 4.0 02/14/2023    BILITOT 0.3 02/14/2023    ALKPHOS 88 02/14/2023    AST 19 02/14/2023    ALT 19 02/14/2023    ANIONGAP 11 02/14/2023    ESTGFRAFRICA >60 05/19/2022    EGFRNONAA 55 (A) 05/19/2022    TSH 0.873 02/14/2023       Assessment and Plan     Multinodular goiter  I have reviewed management options      Discussed  indications for repeat biopsy as well as surgical indications (abnormal FNA, compressive symptoms or interval change)     Plan ultrasound     Osteoporosis  Anticipate improvement after parathyroidectomy.  Repeat bone density       IGT (impaired glucose tolerance)    alerted as to the increased risk for t2DM  Stressed diet and exercise   Periodic hba1c levels      The patient location is: home  The chief complaint leading to consultation is: above    Visit type: audiovisual    Face to Face time with patient: 30 minutes of total time spent on the encounter, which includes face to face time and  non-face to face time preparing to see the patient (eg, review of tests), Obtaining and/or reviewing separately obtained history, Documenting clinical information in the electronic or other health record, Independently interpreting results (not separately reported) and communicating results to the patient/family/caregiver, or Care coordination (not separately reported).         Each patient to whom he or she provides medical services by telemedicine is:  (1) informed of the relationship between the physician and patient and the respective role of any other health care provider with respect to management of the patient; and (2) notified that he or she may decline to receive medical services by telemedicine and may withdraw from such care at any time.

## 2023-02-15 NOTE — PATIENT INSTRUCTIONS
Thank you for completing a virtual visit with me!     Per our conversation ...  Do the best that you can with diet and exercise.  My office will reach out to arrange a repeat thyroid ultrasound and bone density.      Please let me know if you have any other questions.    Thank you,  Valerie Kaba MD

## 2023-02-15 NOTE — ASSESSMENT & PLAN NOTE
I have reviewed management options      Discussed  indications for repeat biopsy as well as surgical indications (abnormal FNA, compressive symptoms or interval change)     Plan ultrasound

## 2023-02-27 ENCOUNTER — OFFICE VISIT (OUTPATIENT)
Dept: INTERNAL MEDICINE | Facility: CLINIC | Age: 58
End: 2023-02-27
Payer: COMMERCIAL

## 2023-02-27 VITALS
DIASTOLIC BLOOD PRESSURE: 76 MMHG | WEIGHT: 158.94 LBS | HEIGHT: 64 IN | OXYGEN SATURATION: 98 % | HEART RATE: 89 BPM | SYSTOLIC BLOOD PRESSURE: 130 MMHG | BODY MASS INDEX: 27.13 KG/M2

## 2023-02-27 DIAGNOSIS — M25.512 ACUTE PAIN OF LEFT SHOULDER: Primary | ICD-10-CM

## 2023-02-27 PROCEDURE — 4010F PR ACE/ARB THEARPY RXD/TAKEN: ICD-10-PCS | Mod: CPTII,S$GLB,, | Performed by: PHYSICIAN ASSISTANT

## 2023-02-27 PROCEDURE — 99999 PR PBB SHADOW E&M-EST. PATIENT-LVL IV: ICD-10-PCS | Mod: PBBFAC,,, | Performed by: PHYSICIAN ASSISTANT

## 2023-02-27 PROCEDURE — 99214 PR OFFICE/OUTPT VISIT, EST, LEVL IV, 30-39 MIN: ICD-10-PCS | Mod: S$GLB,,, | Performed by: PHYSICIAN ASSISTANT

## 2023-02-27 PROCEDURE — 99214 OFFICE O/P EST MOD 30 MIN: CPT | Mod: S$GLB,,, | Performed by: PHYSICIAN ASSISTANT

## 2023-02-27 PROCEDURE — 3044F PR MOST RECENT HEMOGLOBIN A1C LEVEL <7.0%: ICD-10-PCS | Mod: CPTII,S$GLB,, | Performed by: PHYSICIAN ASSISTANT

## 2023-02-27 PROCEDURE — 3008F PR BODY MASS INDEX (BMI) DOCUMENTED: ICD-10-PCS | Mod: CPTII,S$GLB,, | Performed by: PHYSICIAN ASSISTANT

## 2023-02-27 PROCEDURE — 3044F HG A1C LEVEL LT 7.0%: CPT | Mod: CPTII,S$GLB,, | Performed by: PHYSICIAN ASSISTANT

## 2023-02-27 PROCEDURE — 3075F PR MOST RECENT SYSTOLIC BLOOD PRESS GE 130-139MM HG: ICD-10-PCS | Mod: CPTII,S$GLB,, | Performed by: PHYSICIAN ASSISTANT

## 2023-02-27 PROCEDURE — 99999 PR PBB SHADOW E&M-EST. PATIENT-LVL IV: CPT | Mod: PBBFAC,,, | Performed by: PHYSICIAN ASSISTANT

## 2023-02-27 PROCEDURE — 3078F DIAST BP <80 MM HG: CPT | Mod: CPTII,S$GLB,, | Performed by: PHYSICIAN ASSISTANT

## 2023-02-27 PROCEDURE — 4010F ACE/ARB THERAPY RXD/TAKEN: CPT | Mod: CPTII,S$GLB,, | Performed by: PHYSICIAN ASSISTANT

## 2023-02-27 PROCEDURE — 3008F BODY MASS INDEX DOCD: CPT | Mod: CPTII,S$GLB,, | Performed by: PHYSICIAN ASSISTANT

## 2023-02-27 PROCEDURE — 3078F PR MOST RECENT DIASTOLIC BLOOD PRESSURE < 80 MM HG: ICD-10-PCS | Mod: CPTII,S$GLB,, | Performed by: PHYSICIAN ASSISTANT

## 2023-02-27 PROCEDURE — 3075F SYST BP GE 130 - 139MM HG: CPT | Mod: CPTII,S$GLB,, | Performed by: PHYSICIAN ASSISTANT

## 2023-02-27 RX ORDER — MELOXICAM 15 MG/1
15 TABLET ORAL DAILY
Qty: 14 TABLET | Refills: 0 | Status: SHIPPED | OUTPATIENT
Start: 2023-02-27 | End: 2023-07-06

## 2023-02-27 NOTE — PROGRESS NOTES
INTERNAL MEDICINE URGENT VISIT NOTE    CHIEF COMPLAINT     Chief Complaint   Patient presents with    Arm Pain     LT arm x 4 days       HPI     Pastora Cota is a 57 y.o. female who presents for an urgent visit today.    PCP is Kiko Koch MD, patient is new to me.     Patient presents with complaints of pain in the left shoulder that has been present for about 4 days. She reports no known trauma or injury. 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 She reports some minimal improvement with ibuprofen 800 mg and ice compresses. No associated fever, neck pain, numbness or tingling to the hand. No swelling. No chest pain or SOB. No recent travel or sick contacts. No history of similar symptoms in the past.       Past Medical History:  Past Medical History:   Diagnosis Date    Abnormal Pap smear     repeat normal    Abnormal Pap smear of cervix     Abnormal Pap smear of vagina     Allergy     seasonal    AR (allergic rhinitis)     Hypercalcemia 9/10/2015    Hypertension     IGT (impaired glucose tolerance) 9/9/2014    Pneumonia 12/2016    Primary hyperparathyroidism 1/10/2018       Home Medications:  Prior to Admission medications    Medication Sig Start Date End Date Taking? Authorizing Provider   amLODIPine (NORVASC) 10 MG tablet Take 1 tablet (10 mg total) by mouth once daily. 2/8/23   Kiko Koch MD   cholecalciferol, vitamin D3, (VITAMIN D3) 50 mcg (2,000 unit) Cap Take 1 capsule (2,000 Units total) by mouth once daily. 8/25/20   Valerie Kaba MD   furosemide (LASIX) 20 MG tablet Take 1 tablet (20 mg total) by mouth once daily. 2/8/23   Kiko Koch MD   hydrOXYzine HCL (ATARAX) 25 MG tablet Take 1 tablet (25 mg total) by mouth 3 (three) times daily.  Patient taking differently: Take 25 mg by mouth 3 (three) times daily. prn 5/19/22   Kiko Koch MD   losartan (COZAAR) 100 MG tablet Take 1 tablet (100  "mg total) by mouth every evening. 2/8/23   Kiko Koch MD   metoprolol tartrate (LOPRESSOR) 25 MG tablet Take 0.5 tablets (12.5 mg total) by mouth 2 (two) times daily. 2/8/23 2/8/24  Kiko Koch MD   spironolactone (ALDACTONE) 25 MG tablet Take 1 tablet (25 mg total) by mouth once daily. 2/8/23 2/8/24  Kiko Koch MD       Review of Systems:  Review of Systems   Constitutional:  Negative for chills and fever.   HENT:  Negative for sore throat and trouble swallowing.    Eyes:  Negative for visual disturbance.   Respiratory:  Negative for cough and shortness of breath.    Cardiovascular:  Negative for chest pain.   Gastrointestinal:  Negative for abdominal pain, constipation, diarrhea, nausea and vomiting.   Genitourinary:  Negative for dysuria and flank pain.   Musculoskeletal:  Negative for back pain, neck pain and neck stiffness.        Left shoulder pain    Skin:  Negative for rash.   Neurological:  Negative for dizziness, syncope, weakness and headaches.   Psychiatric/Behavioral:  Negative for confusion.      Health Maintainence:   Immunizations:  Health Maintenance         Date Due Completion Date    Pneumococcal Vaccines (Age 0-64) (1 - PCV) Never done ---    DEXA Scan 07/24/2022 7/24/2020    COVID-19 Vaccine (5 - Booster for Pfizer series) 09/29/2022 8/4/2022    Lipid Panel 05/19/2023 5/19/2022    Mammogram 01/10/2024 1/10/2023    Hemoglobin A1c (Prediabetes) 02/14/2024 2/14/2023    TETANUS VACCINE 09/30/2025 9/30/2015    Colorectal Cancer Screening 12/27/2027 12/27/2017             PHYSICAL EXAM     /76 (BP Location: Left arm, Patient Position: Sitting, BP Method: Large (Manual))   Pulse 89   Ht 5' 4" (1.626 m)   Wt 72.1 kg (158 lb 15.2 oz)   SpO2 98%   BMI 27.28 kg/m²     Physical Exam  Vitals and nursing note reviewed.   Constitutional:       Appearance: Normal appearance.      Comments: Healthy appearing female in NAD or apparent pain. She makes good eye contact, speaks " in clear full sentences and ambulates with ease.        HENT:      Head: Normocephalic and atraumatic.      Nose: Nose normal.      Mouth/Throat:      Pharynx: Oropharynx is clear.   Eyes:      Conjunctiva/sclera: Conjunctivae normal.   Cardiovascular:      Rate and Rhythm: Normal rate and regular rhythm.      Pulses: Normal pulses.   Pulmonary:      Effort: No respiratory distress.   Abdominal:      Tenderness: There is no abdominal tenderness.   Musculoskeletal:         General: Normal range of motion.      Cervical back: No rigidity.      Comments: Right hand dominant  Left shoulder with TTP over deltoid region. Minimal pain with internal and external ROM.   Pain with flexion and extension. Normal distal pulse  No obvious bony landmark TTP or deformity.   No C T or L midline bony TTP crepitus or stepoffs    Skin:     General: Skin is warm and dry.      Capillary Refill: Capillary refill takes less than 2 seconds.      Findings: No rash.   Neurological:      General: No focal deficit present.      Mental Status: She is alert.      Gait: Gait normal.   Psychiatric:         Mood and Affect: Mood normal.       LABS     Lab Results   Component Value Date    HGBA1C 5.7 (H) 02/14/2023     CMP  Sodium   Date Value Ref Range Status   02/14/2023 141 136 - 145 mmol/L Final     Potassium   Date Value Ref Range Status   02/14/2023 3.6 3.5 - 5.1 mmol/L Final     Chloride   Date Value Ref Range Status   02/14/2023 106 95 - 110 mmol/L Final     CO2   Date Value Ref Range Status   02/14/2023 24 23 - 29 mmol/L Final     Glucose   Date Value Ref Range Status   02/14/2023 89 70 - 110 mg/dL Final     BUN   Date Value Ref Range Status   02/14/2023 16 6 - 20 mg/dL Final     Creatinine   Date Value Ref Range Status   02/14/2023 0.9 0.5 - 1.4 mg/dL Final     Calcium   Date Value Ref Range Status   02/14/2023 9.3 8.7 - 10.5 mg/dL Final     Total Protein   Date Value Ref Range Status   02/14/2023 7.9 6.0 - 8.4 g/dL Final     Albumin   Date  Value Ref Range Status   02/14/2023 4.0 3.5 - 5.2 g/dL Final     Total Bilirubin   Date Value Ref Range Status   02/14/2023 0.3 0.1 - 1.0 mg/dL Final     Comment:     For infants and newborns, interpretation of results should be based  on gestational age, weight and in agreement with clinical  observations.    Premature Infant recommended reference ranges:  Up to 24 hours.............<8.0 mg/dL  Up to 48 hours............<12.0 mg/dL  3-5 days..................<15.0 mg/dL  6-29 days.................<15.0 mg/dL       Alkaline Phosphatase   Date Value Ref Range Status   02/14/2023 88 55 - 135 U/L Final     AST   Date Value Ref Range Status   02/14/2023 19 10 - 40 U/L Final     ALT   Date Value Ref Range Status   02/14/2023 19 10 - 44 U/L Final     Anion Gap   Date Value Ref Range Status   02/14/2023 11 8 - 16 mmol/L Final     eGFR if    Date Value Ref Range Status   05/19/2022 >60 >60 mL/min/1.73 m^2 Final     eGFR if non    Date Value Ref Range Status   05/19/2022 55 (A) >60 mL/min/1.73 m^2 Final     Comment:     Calculation used to obtain the estimated glomerular filtration  rate (eGFR) is the CKD-EPI equation.        Lab Results   Component Value Date    WBC 7.63 05/19/2022    HGB 12.8 05/19/2022    HCT 40.5 05/19/2022    MCV 76 (L) 05/19/2022     05/19/2022     Lab Results   Component Value Date    CHOL 207 (H) 05/19/2022    CHOL 196 01/28/2021    CHOL 130 04/07/2019     Lab Results   Component Value Date    HDL 46 05/19/2022    HDL 40 01/28/2021    HDL 38 (L) 04/07/2019     Lab Results   Component Value Date    LDLCALC 142.4 05/19/2022    LDLCALC 135.6 01/28/2021    LDLCALC 85.6 04/07/2019     Lab Results   Component Value Date    TRIG 93 05/19/2022    TRIG 102 01/28/2021    TRIG 32 04/07/2019     Lab Results   Component Value Date    CHOLHDL 22.2 05/19/2022    CHOLHDL 20.4 01/28/2021    CHOLHDL 29.2 04/07/2019     Lab Results   Component Value Date    TSH 0.873 02/14/2023        ASSESSMENT/PLAN     Pastora Cota is a 57 y.o. female     Pastora was seen today for left shoulder pain - clinical concern for impingement syndrome vs bursitis. Referral placed to ortho and PT. Will start mobic for pain relief.     Diagnoses and all orders for this visit:    Acute pain of left shoulder  -     Ambulatory referral/consult to Physical/Occupational Therapy; Future  -     Ambulatory referral/consult to Orthopedics; Future    Other orders  -     meloxicam (MOBIC) 15 MG tablet; Take 1 tablet (15 mg total) by mouth once daily.       Patient was counseled on when and how to seek emergent care.       Leticia Rey PA-C   Department of Internal Medicine - [facility]   11:44 AM

## 2023-03-10 DIAGNOSIS — R52 PAIN: Primary | ICD-10-CM

## 2023-03-16 ENCOUNTER — TELEPHONE (OUTPATIENT)
Dept: ORTHOPEDICS | Facility: CLINIC | Age: 58
End: 2023-03-16
Payer: COMMERCIAL

## 2023-03-20 ENCOUNTER — OFFICE VISIT (OUTPATIENT)
Dept: ORTHOPEDICS | Facility: CLINIC | Age: 58
End: 2023-03-20
Payer: COMMERCIAL

## 2023-03-20 ENCOUNTER — HOSPITAL ENCOUNTER (OUTPATIENT)
Dept: RADIOLOGY | Facility: OTHER | Age: 58
Discharge: HOME | End: 2023-03-20
Attending: PHYSICIAN ASSISTANT
Payer: COMMERCIAL

## 2023-03-20 VITALS — HEIGHT: 64 IN | BODY MASS INDEX: 26.98 KG/M2 | WEIGHT: 158 LBS

## 2023-03-20 DIAGNOSIS — M25.512 ACUTE PAIN OF LEFT SHOULDER: ICD-10-CM

## 2023-03-20 DIAGNOSIS — R52 PAIN: ICD-10-CM

## 2023-03-20 PROCEDURE — 1159F PR MEDICATION LIST DOCUMENTED IN MEDICAL RECORD: ICD-10-PCS | Mod: CPTII,S$GLB,, | Performed by: PHYSICIAN ASSISTANT

## 2023-03-20 PROCEDURE — 4010F PR ACE/ARB THEARPY RXD/TAKEN: ICD-10-PCS | Mod: CPTII,S$GLB,, | Performed by: PHYSICIAN ASSISTANT

## 2023-03-20 PROCEDURE — 3044F HG A1C LEVEL LT 7.0%: CPT | Mod: CPTII,S$GLB,, | Performed by: PHYSICIAN ASSISTANT

## 2023-03-20 PROCEDURE — 1159F MED LIST DOCD IN RCRD: CPT | Mod: CPTII,S$GLB,, | Performed by: PHYSICIAN ASSISTANT

## 2023-03-20 PROCEDURE — 73030 X-RAY EXAM OF SHOULDER: CPT | Mod: TC,FY,LT

## 2023-03-20 PROCEDURE — 99999 PR PBB SHADOW E&M-EST. PATIENT-LVL III: ICD-10-PCS | Mod: PBBFAC,,, | Performed by: PHYSICIAN ASSISTANT

## 2023-03-20 PROCEDURE — 3008F BODY MASS INDEX DOCD: CPT | Mod: CPTII,S$GLB,, | Performed by: PHYSICIAN ASSISTANT

## 2023-03-20 PROCEDURE — 3044F PR MOST RECENT HEMOGLOBIN A1C LEVEL <7.0%: ICD-10-PCS | Mod: CPTII,S$GLB,, | Performed by: PHYSICIAN ASSISTANT

## 2023-03-20 PROCEDURE — 99999 PR PBB SHADOW E&M-EST. PATIENT-LVL III: CPT | Mod: PBBFAC,,, | Performed by: PHYSICIAN ASSISTANT

## 2023-03-20 PROCEDURE — 99213 PR OFFICE/OUTPT VISIT, EST, LEVL III, 20-29 MIN: ICD-10-PCS | Mod: S$GLB,,, | Performed by: PHYSICIAN ASSISTANT

## 2023-03-20 PROCEDURE — 3008F PR BODY MASS INDEX (BMI) DOCUMENTED: ICD-10-PCS | Mod: CPTII,S$GLB,, | Performed by: PHYSICIAN ASSISTANT

## 2023-03-20 PROCEDURE — 99213 OFFICE O/P EST LOW 20 MIN: CPT | Mod: S$GLB,,, | Performed by: PHYSICIAN ASSISTANT

## 2023-03-20 PROCEDURE — 73030 XR SHOULDER TRAUMA 3 VIEW LEFT: ICD-10-PCS | Mod: 26,LT,, | Performed by: RADIOLOGY

## 2023-03-20 PROCEDURE — 73030 X-RAY EXAM OF SHOULDER: CPT | Mod: 26,LT,, | Performed by: RADIOLOGY

## 2023-03-20 PROCEDURE — 4010F ACE/ARB THERAPY RXD/TAKEN: CPT | Mod: CPTII,S$GLB,, | Performed by: PHYSICIAN ASSISTANT

## 2023-03-20 NOTE — PROGRESS NOTES
Subjective:      Patient ID: Pastora Cota is a 57 y.o. female.    Chief Complaint: Pain of the Left Shoulder      HPI  Pastora Cota is a  57 y.o. female presenting today for evaluation of left shoulder pain referred by primary care. Onset 1 mo ago no injury. Had pain, decreased motion, increased pain at night. Took anti inflammatories and symptoms resolved. Currently no pain, full motion.       Review of patient's allergies indicates:   Allergen Reactions    Sulfa (sulfonamide antibiotics) Hives and Edema         Current Outpatient Medications   Medication Sig Dispense Refill    amLODIPine (NORVASC) 10 MG tablet Take 1 tablet (10 mg total) by mouth once daily. 90 tablet 2    cholecalciferol, vitamin D3, (VITAMIN D3) 50 mcg (2,000 unit) Cap Take 1 capsule (2,000 Units total) by mouth once daily.      furosemide (LASIX) 20 MG tablet Take 1 tablet (20 mg total) by mouth once daily. 90 tablet 2    losartan (COZAAR) 100 MG tablet Take 1 tablet (100 mg total) by mouth every evening. 90 tablet 2    meloxicam (MOBIC) 15 MG tablet Take 1 tablet (15 mg total) by mouth once daily. 14 tablet 0    metoprolol tartrate (LOPRESSOR) 25 MG tablet Take 0.5 tablets (12.5 mg total) by mouth 2 (two) times daily. 90 tablet 2    spironolactone (ALDACTONE) 25 MG tablet Take 1 tablet (25 mg total) by mouth once daily. 90 tablet 3    hydrOXYzine HCL (ATARAX) 25 MG tablet Take 1 tablet (25 mg total) by mouth 3 (three) times daily. (Patient not taking: Reported on 2/27/2023) 45 tablet 1     No current facility-administered medications for this visit.       Past Medical History:   Diagnosis Date    Abnormal Pap smear     repeat normal    Abnormal Pap smear of cervix     Abnormal Pap smear of vagina     Allergy     seasonal    AR (allergic rhinitis)     Hypercalcemia 9/10/2015    Hypertension     IGT (impaired glucose tolerance) 9/9/2014    Pneumonia 12/2016    Primary hyperparathyroidism 1/10/2018       Past Surgical History:  "  Procedure Laterality Date    ABDOMINAL SURGERY      CHOLECYSTECTOMY      1993    COLONOSCOPY N/A 9/24/2016    Procedure: COLONOSCOPY;  Surgeon: Johnny Clayton MD;  Location: [facility] (4TH FLR);  Service: Endoscopy;  Laterality: N/A;    COLONOSCOPY N/A 12/27/2017    Procedure: COLONOSCOPY;  Surgeon: Johnny Clayton MD;  Location: [facility] (4TH FLR);  Service: Endoscopy;  Laterality: N/A;    COLPOSCOPY      DILATION AND CURETTAGE OF UTERUS      HYSTERECTOMY      2007 tahbso     OOPHORECTOMY      PARATHYROIDECTOMY Right 12/11/2019    Procedure: PARATHYROIDECTOMY-with intra-op iPTH levels possible subtotal;  Surgeon: Kiko Wilson MD;  Location: [facility] OR 2ND FLR;  Service: General;  Laterality: Right;       Review of Systems:  Constitutional: Negative for chills and fever.   Respiratory: Negative for cough and shortness of breath.    Gastrointestinal: Negative for nausea and vomiting.   Skin: Negative for rash.   Neurological: Negative for dizziness and headaches.   Psychiatric/Behavioral: Negative for depression.   MSK as in HPI       OBJECTIVE:     PHYSICAL EXAM:  Ht 5' 4" (1.626 m)   Wt 71.7 kg (158 lb)   BMI 27.12 kg/m²     GEN:  NAD, well-developed, well-groomed.  NEURO: Awake, alert, and oriented. Normal attention and concentration.    PSYCH: Normal mood and affect. Behavior is normal.  HEENT: No cervical lymphadenopathy noted.  CARDIOVASCULAR: Radial pulses 2+ bilaterally. No LE edema noted.  PULMONARY: Breath sounds normal. No respiratory distress.  SKIN: Intact, no rashes.      MSK:   LUE:  Good active ROM of the wrist and fingers. Full shoulder motion. Negative impingement.AIN/PIN/Radial/Median/Ulnar Nerves assessed in isolation without deficit. Radial & Ulnar arteries palpated 2+. Capillary Refill <3s.      RADIOGRAPHS:  Xray left shoulder 3/20/23   Impression:     No acute osseous abnormality seen.  Comments: I have personally reviewed the imaging and I agree with the above radiologist's " report.    ASSESSMENT/PLAN:       ICD-10-CM ICD-9-CM   1. Acute pain of left shoulder  M25.512 719.41          No orders of the defined types were placed in this encounter.       Plan:   Symptoms have resolved doing well follow up as needed        The patient indicates understanding of these issues and agrees to the plan.    Patricia Luis PA-C  [facility]   [facility]  [place], [place]

## 2023-04-03 ENCOUNTER — OFFICE VISIT (OUTPATIENT)
Dept: OBSTETRICS AND GYNECOLOGY | Facility: CLINIC | Age: 58
End: 2023-04-03
Attending: OBSTETRICS & GYNECOLOGY
Payer: COMMERCIAL

## 2023-04-03 VITALS
BODY MASS INDEX: 27.49 KG/M2 | HEART RATE: 71 BPM | WEIGHT: 161 LBS | SYSTOLIC BLOOD PRESSURE: 155 MMHG | DIASTOLIC BLOOD PRESSURE: 75 MMHG | HEIGHT: 64 IN

## 2023-04-03 DIAGNOSIS — N89.8 VAGINAL DISCHARGE: Primary | ICD-10-CM

## 2023-04-03 DIAGNOSIS — Z01.419 ENCOUNTER FOR GYNECOLOGICAL EXAMINATION WITHOUT ABNORMAL FINDING: ICD-10-CM

## 2023-04-03 PROCEDURE — 3008F BODY MASS INDEX DOCD: CPT | Mod: CPTII,S$GLB,, | Performed by: OBSTETRICS & GYNECOLOGY

## 2023-04-03 PROCEDURE — 99999 PR PBB SHADOW E&M-EST. PATIENT-LVL III: CPT | Mod: PBBFAC,,, | Performed by: OBSTETRICS & GYNECOLOGY

## 2023-04-03 PROCEDURE — 1159F PR MEDICATION LIST DOCUMENTED IN MEDICAL RECORD: ICD-10-PCS | Mod: CPTII,S$GLB,, | Performed by: OBSTETRICS & GYNECOLOGY

## 2023-04-03 PROCEDURE — 99396 PREV VISIT EST AGE 40-64: CPT | Mod: S$GLB,,, | Performed by: OBSTETRICS & GYNECOLOGY

## 2023-04-03 PROCEDURE — 3077F SYST BP >= 140 MM HG: CPT | Mod: CPTII,S$GLB,, | Performed by: OBSTETRICS & GYNECOLOGY

## 2023-04-03 PROCEDURE — 1159F MED LIST DOCD IN RCRD: CPT | Mod: CPTII,S$GLB,, | Performed by: OBSTETRICS & GYNECOLOGY

## 2023-04-03 PROCEDURE — 81514 NFCT DS BV&VAGINITIS DNA ALG: CPT | Performed by: OBSTETRICS & GYNECOLOGY

## 2023-04-03 PROCEDURE — 3008F PR BODY MASS INDEX (BMI) DOCUMENTED: ICD-10-PCS | Mod: CPTII,S$GLB,, | Performed by: OBSTETRICS & GYNECOLOGY

## 2023-04-03 PROCEDURE — 3044F HG A1C LEVEL LT 7.0%: CPT | Mod: CPTII,S$GLB,, | Performed by: OBSTETRICS & GYNECOLOGY

## 2023-04-03 PROCEDURE — 99396 PR PREVENTIVE VISIT,EST,40-64: ICD-10-PCS | Mod: S$GLB,,, | Performed by: OBSTETRICS & GYNECOLOGY

## 2023-04-03 PROCEDURE — 4010F ACE/ARB THERAPY RXD/TAKEN: CPT | Mod: CPTII,S$GLB,, | Performed by: OBSTETRICS & GYNECOLOGY

## 2023-04-03 PROCEDURE — 99999 PR PBB SHADOW E&M-EST. PATIENT-LVL III: ICD-10-PCS | Mod: PBBFAC,,, | Performed by: OBSTETRICS & GYNECOLOGY

## 2023-04-03 PROCEDURE — 4010F PR ACE/ARB THEARPY RXD/TAKEN: ICD-10-PCS | Mod: CPTII,S$GLB,, | Performed by: OBSTETRICS & GYNECOLOGY

## 2023-04-03 PROCEDURE — 3078F PR MOST RECENT DIASTOLIC BLOOD PRESSURE < 80 MM HG: ICD-10-PCS | Mod: CPTII,S$GLB,, | Performed by: OBSTETRICS & GYNECOLOGY

## 2023-04-03 PROCEDURE — 3077F PR MOST RECENT SYSTOLIC BLOOD PRESSURE >= 140 MM HG: ICD-10-PCS | Mod: CPTII,S$GLB,, | Performed by: OBSTETRICS & GYNECOLOGY

## 2023-04-03 PROCEDURE — 3078F DIAST BP <80 MM HG: CPT | Mod: CPTII,S$GLB,, | Performed by: OBSTETRICS & GYNECOLOGY

## 2023-04-03 PROCEDURE — 3044F PR MOST RECENT HEMOGLOBIN A1C LEVEL <7.0%: ICD-10-PCS | Mod: CPTII,S$GLB,, | Performed by: OBSTETRICS & GYNECOLOGY

## 2023-04-03 NOTE — PROGRESS NOTES
SUBJECTIVE:   57 y.o. female   for annual routine  checkup. No LMP recorded. Patient has had a hysterectomy..  She reports vaginal discharge off and on for 2 weeks. She denies itching or burning. .        Past Medical History:   Diagnosis Date    Abnormal Pap smear     repeat normal    Abnormal Pap smear of cervix     Abnormal Pap smear of vagina     Allergy     seasonal    AR (allergic rhinitis)     Hypercalcemia 9/10/2015    Hypertension     IGT (impaired glucose tolerance) 2014    Pneumonia 2016    Primary hyperparathyroidism 1/10/2018     Past Surgical History:   Procedure Laterality Date    ABDOMINAL SURGERY      CHOLECYSTECTOMY          COLONOSCOPY N/A 2016    Procedure: COLONOSCOPY;  Surgeon: Johnny Clayton MD;  Location: [facility] ([address] [place] [address]);  Service: Endoscopy;  Laterality: N/A;    COLONOSCOPY N/A 2017    Procedure: COLONOSCOPY;  Surgeon: Johnny Clayton MD;  Location: [facility] ([address] [place] [place] [address]);  Service: Endoscopy;  Laterality: N/A;    COLPOSCOPY      DILATION AND CURETTAGE OF UTERUS      HYSTERECTOMY       tahbso     OOPHORECTOMY      PARATHYROIDECTOMY Right 2019    Procedure: PARATHYROIDECTOMY-with intra-op iPTH levels possible subtotal;  Surgeon: Kiko Wilson MD;  Location: [facility] OR [address] [place] [address];  Service: General;  Laterality: Right;     Social History     Socioeconomic History    Marital status:    Occupational History    Occupation:      Employer: [employer]    Tobacco Use    Smoking status: Never    Smokeless tobacco: Never   Substance and Sexual Activity    Alcohol use: Yes     Alcohol/week: 0.0 standard drinks     Types: 1 Standard drinks or equivalent per week     Comment: socailly    Drug use: No    Sexual activity: Yes     Partners: Male     Birth control/protection: None, See Surgical Hx     Comment: ; hysterectomy   Social History Narrative    No regular exercise     with 1 child     Social Determinants of Health      Financial Resource Strain: Medium Risk    Difficulty of Paying Living Expenses: Somewhat hard   Food Insecurity: Food Insecurity Present    Worried About Running Out of Food in the Last Year: Sometimes true    Ran Out of Food in the Last Year: Sometimes true   Transportation Needs: No Transportation Needs    Lack of Transportation (Medical): No    Lack of Transportation (Non-Medical): No   Physical Activity: Insufficiently Active    Days of Exercise per Week: 1 day    Minutes of Exercise per Session: 40 min   Stress: Stress Concern Present    Feeling of Stress : Very much   Social Connections: Unknown    Frequency of Communication with Friends and Family: Three times a week    Frequency of Social Gatherings with Friends and Family: Three times a week    Active Member of Clubs or Organizations: Yes    Attends Club or Organization Meetings: 1 to 4 times per year    Marital Status:    Housing Stability: Low Risk     Unable to Pay for Housing in the Last Year: No    Number of Places Lived in the Last Year: 1    Unstable Housing in the Last Year: No     Family History   Problem Relation Age of Onset    No Known Problems Paternal Grandfather     No Known Problems Paternal Grandmother     No Known Problems Maternal Grandmother     No Known Problems Maternal Grandfather     Glaucoma Father     Hypertension Mother     Diabetes Mother     Glaucoma Mother     No Known Problems Brother     Hypertension Sister         four sisters    Breast cancer Sister 44    Breast cancer Sister 65    No Known Problems Maternal Aunt     No Known Problems Maternal Uncle     No Known Problems Paternal Aunt     No Known Problems Paternal Uncle     Thyroid cancer Neg Hx     Cancer Neg Hx     Colon cancer Neg Hx     Ovarian cancer Neg Hx     Melanoma Neg Hx     Psoriasis Neg Hx     Lupus Neg Hx     Amblyopia Neg Hx     Blindness Neg Hx     Cataracts Neg Hx     Macular degeneration Neg Hx     Retinal detachment Neg Hx     Strabismus Neg  Hx     Stroke Neg Hx     Thyroid disease Neg Hx     Esophageal cancer Neg Hx      OB History    Para Term  AB Living   3 2 2   1     SAB IAB Ectopic Multiple Live Births   1              # Outcome Date GA Lbr Leonardo/2nd Weight Sex Delivery Anes PTL Lv   3 Term      Vag-Spont      2 Term      Vag-Spont      1 SAB                    Current Outpatient Medications   Medication Sig Dispense Refill    amLODIPine (NORVASC) 10 MG tablet Take 1 tablet (10 mg total) by mouth once daily. 90 tablet 2    cholecalciferol, vitamin D3, (VITAMIN D3) 50 mcg (2,000 unit) Cap Take 1 capsule (2,000 Units total) by mouth once daily.      furosemide (LASIX) 20 MG tablet Take 1 tablet (20 mg total) by mouth once daily. 90 tablet 2    hydrOXYzine HCL (ATARAX) 25 MG tablet Take 1 tablet (25 mg total) by mouth 3 (three) times daily. (Patient not taking: Reported on 2023) 45 tablet 1    losartan (COZAAR) 100 MG tablet Take 1 tablet (100 mg total) by mouth every evening. 90 tablet 2    meloxicam (MOBIC) 15 MG tablet Take 1 tablet (15 mg total) by mouth once daily. 14 tablet 0    metoprolol tartrate (LOPRESSOR) 25 MG tablet Take 0.5 tablets (12.5 mg total) by mouth 2 (two) times daily. 90 tablet 2    spironolactone (ALDACTONE) 25 MG tablet Take 1 tablet (25 mg total) by mouth once daily. 90 tablet 3     No current facility-administered medications for this visit.     Allergies: Sulfa (sulfonamide antibiotics)     The 10-year ASCVD risk score (Tosha DK, et al., 2019) is: 12.9%    Values used to calculate the score:      Age: 57 years      Sex: Female      Is Non- : Yes      Diabetic: No      Tobacco smoker: No      Systolic Blood Pressure: 155 mmHg      Is BP treated: Yes      HDL Cholesterol: 46 mg/dL      Total Cholesterol: 207 mg/dL      ROS:  Constitutional: no weight loss, weight gain, fever, fatigue  Eyes:  No vision changes, glasses/contacts  ENT/Mouth: No ulcers, sinus problems, ears ringing,  headache  Cardiovascular: No inability to lie flat, chest pain, exercise intolerance, swelling, heart palpitations  Respiratory: No wheezing, coughing blood, shortness of breath, or cough  Gastrointestinal: No diarrhea, bloody stool, nausea/vomiting, constipation, gas, hemorrhoids  Genitourinary: No blood in urine, painful urination, urgency of urination, frequency of urination, incomplete emptying, incontinence, abnormal bleeding, painful periods, heavy periods, vaginal discharge, vaginal odor, painful intercourse, sexual problems, bleeding after intercourse.  Musculoskeletal: No muscle weakness  Skin/Breast: No painful breasts, nipple discharge, masses, rash, ulcers  Neurological: No passing out, seizures, numbness, headache  Endocrine: No diabetes, hypothyroid, hyperthyroid, hot flashes, hair loss, abnormal hair growth, acne  Psychiatric: No depression, crying  Hematologic: No bruises, bleeding, swollen lymph nodes, anemia.      Physical Exam:   Constitutional: She is oriented to person, place, and time. She appears well-developed and well-nourished.      Neck: No tracheal deviation present. No thyromegaly present.    Cardiovascular:       Exam reveals no edema.        Pulmonary/Chest: Effort normal. She exhibits no mass, no tenderness, no deformity and no retraction. Right breast exhibits no inverted nipple, no mass, no nipple discharge, no skin change, no tenderness, presence, no bleeding and no swelling. Left breast exhibits no inverted nipple, no mass, no nipple discharge, no skin change, no tenderness, presence, no bleeding and no swelling. Breasts are symmetrical.        Abdominal: Soft. She exhibits no distension and no mass. There is no abdominal tenderness. There is no rebound and no guarding. No hernia. Hernia confirmed negative in the left inguinal area.     Genitourinary: Rectum:      No external hemorrhoid.   There is no rash, tenderness or lesion on the right labia. There is no rash, tenderness or  lesion on the left labia. No no adexnal prolapse. Right adnexum displays no mass, no tenderness and no fullness. Left adnexum displays no mass, no tenderness and no fullness. Vaginal cuff normal.  No  no vaginal discharge, tenderness, bleeding, rectocele, cystocele or unspecified prolapse of vaginal walls in the vagina. Cervix is absent.Uterus is absent.           Musculoskeletal: Normal range of motion and moves all extremeties. No edema.       Neurological: She is alert and oriented to person, place, and time.    Skin: No rash noted. No erythema. No pallor.    Psychiatric: She has a normal mood and affect. Her behavior is normal. Judgment and thought content normal.       ASSESSMENT:   well woman  Vaginal discharge  PLAN:   Mammogram  Affirm  return annually or prn

## 2023-04-06 LAB
BACTERIAL VAGINOSIS DNA: POSITIVE
CANDIDA GLABRATA DNA: NEGATIVE
CANDIDA KRUSEI DNA: NEGATIVE
CANDIDA RRNA VAG QL PROBE: POSITIVE
T VAGINALIS RRNA GENITAL QL PROBE: NEGATIVE

## 2023-04-07 ENCOUNTER — TELEPHONE (OUTPATIENT)
Dept: OBSTETRICS AND GYNECOLOGY | Facility: CLINIC | Age: 58
End: 2023-04-07
Payer: COMMERCIAL

## 2023-04-07 RX ORDER — METRONIDAZOLE 7.5 MG/G
1 GEL VAGINAL DAILY
Qty: 70 G | Refills: 0 | Status: SHIPPED | OUTPATIENT
Start: 2023-04-07 | End: 2023-04-12

## 2023-04-07 RX ORDER — FLUCONAZOLE 150 MG/1
150 TABLET ORAL ONCE
Qty: 1 TABLET | Refills: 1 | Status: SHIPPED | OUTPATIENT
Start: 2023-04-07 | End: 2023-04-07

## 2023-05-02 ENCOUNTER — HOSPITAL ENCOUNTER (OUTPATIENT)
Dept: ENDOCRINOLOGY | Facility: CLINIC | Age: 58
Discharge: HOME | End: 2023-05-02
Attending: INTERNAL MEDICINE
Payer: COMMERCIAL

## 2023-05-02 ENCOUNTER — HOSPITAL ENCOUNTER (OUTPATIENT)
Dept: RADIOLOGY | Facility: CLINIC | Age: 58
Discharge: HOME | End: 2023-05-02
Attending: INTERNAL MEDICINE
Payer: COMMERCIAL

## 2023-05-02 DIAGNOSIS — M81.0 OSTEOPOROSIS, UNSPECIFIED OSTEOPOROSIS TYPE, UNSPECIFIED PATHOLOGICAL FRACTURE PRESENCE: ICD-10-CM

## 2023-05-02 DIAGNOSIS — E04.2 MULTINODULAR GOITER: ICD-10-CM

## 2023-05-02 PROCEDURE — 77080 DEXA BONE DENSITY SPINE HIP: ICD-10-PCS | Mod: 26,,, | Performed by: INTERNAL MEDICINE

## 2023-05-02 PROCEDURE — 77080 DXA BONE DENSITY AXIAL: CPT | Mod: TC

## 2023-05-02 PROCEDURE — 76536 US EXAM OF HEAD AND NECK: CPT | Mod: S$GLB,,, | Performed by: GENERAL ACUTE CARE HOSPITAL

## 2023-05-02 PROCEDURE — 76536 US SOFT TISSUE HEAD NECK THYROID: ICD-10-PCS | Mod: S$GLB,,, | Performed by: GENERAL ACUTE CARE HOSPITAL

## 2023-05-02 PROCEDURE — 77080 DXA BONE DENSITY AXIAL: CPT | Mod: 26,,, | Performed by: INTERNAL MEDICINE

## 2023-05-08 ENCOUNTER — PATIENT MESSAGE (OUTPATIENT)
Dept: ENDOCRINOLOGY | Facility: CLINIC | Age: 58
End: 2023-05-08
Payer: COMMERCIAL

## 2023-05-08 DIAGNOSIS — E04.2 MULTINODULAR GOITER: Primary | ICD-10-CM

## 2023-05-09 ENCOUNTER — PATIENT MESSAGE (OUTPATIENT)
Dept: ENDOCRINOLOGY | Facility: CLINIC | Age: 58
End: 2023-05-09
Payer: COMMERCIAL

## 2023-06-29 ENCOUNTER — HOSPITAL ENCOUNTER (OUTPATIENT)
Dept: ENDOCRINOLOGY | Facility: CLINIC | Age: 58
Discharge: HOME | End: 2023-06-29
Attending: INTERNAL MEDICINE
Payer: COMMERCIAL

## 2023-06-29 DIAGNOSIS — E04.2 MULTINODULAR GOITER: Primary | ICD-10-CM

## 2023-06-29 PROCEDURE — 10005 US FINE NEEDLE ASPIRATION BIOPSY, FIRST LESION: ICD-10-PCS | Mod: S$GLB,,, | Performed by: INTERNAL MEDICINE

## 2023-06-29 PROCEDURE — 10005 FNA BX W/US GDN 1ST LES: CPT | Mod: S$GLB,,, | Performed by: INTERNAL MEDICINE

## 2023-06-29 PROCEDURE — 88173 CYTOPATH EVAL FNA REPORT: CPT | Performed by: STUDENT IN AN ORGANIZED HEALTH CARE EDUCATION/TRAINING PROGRAM

## 2023-06-29 PROCEDURE — 88173 PR  INTERPRETATION OF FNA SMEAR: ICD-10-PCS | Mod: 26,,, | Performed by: STUDENT IN AN ORGANIZED HEALTH CARE EDUCATION/TRAINING PROGRAM

## 2023-06-29 PROCEDURE — 88173 CYTOPATH EVAL FNA REPORT: CPT | Mod: 26,,, | Performed by: STUDENT IN AN ORGANIZED HEALTH CARE EDUCATION/TRAINING PROGRAM

## 2023-07-04 ENCOUNTER — NURSE TRIAGE (OUTPATIENT)
Dept: ADMINISTRATIVE | Facility: CLINIC | Age: 58
End: 2023-07-04
Payer: COMMERCIAL

## 2023-07-04 NOTE — TELEPHONE ENCOUNTER
"Temp of 99.9 today, 99.8 yesterday and 99.8 the day before.  Pt had Thyroid biopsy 4 days ago, site looks ok, just "a little bruising".  Care advice states home care, Patient verbally understands, all questions answered, advised to call back for any worsening symptoms or further needs.     Reason for Disposition   Other post-op symptom or question    Additional Information   Negative: Sounds like a life-threatening emergency to the triager   Negative: [1] Widespread rash AND [2] bright red, sunburn-like   Negative: [1] SEVERE headache AND [2] after spinal (epidural) anesthesia   Negative: [1] Vomiting AND [2] persists > 4 hours   Negative: [1] Vomiting AND [2] abdomen looks much more swollen than usual   Negative: [1] Drinking very little AND [2] dehydration suspected (e.g., no urine > 12 hours, very dry mouth, very lightheaded)   Negative: Patient sounds very sick or weak to the triager   Negative: Sounds like a serious complication to the triager   Negative: Fever > 100.4 F (38.0 C)   Negative: [1] SEVERE post-op pain (e.g., excruciating, pain scale 8-10) AND [2] not controlled with pain medications   Negative: [1] Caller has URGENT question AND [2] triager unable to answer question   Negative: [1] Headache AND [2] after spinal (epidural) anesthesia AND [3] not severe   Negative: Fever present > 3 days (72 hours)   Negative: [1] MILD-MODERATE post-op pain (e.g., pain scale 1-7) AND [2] not controlled with pain medications   Negative: [1] Caller has NON-URGENT question AND [2] triager unable to answer question   Negative: General activity, questions about   Negative: Resuming driving, questions about   Negative: Resuming sexual relations, questions about   Negative: Getting the incision wet, questions about   Negative: Throat pain after surgery, questions about   Negative: [1] Vomiting AND [2] present < 4 hours    Protocols used: Post-Op Symptoms and [id number]    "

## 2023-07-05 ENCOUNTER — PATIENT MESSAGE (OUTPATIENT)
Dept: ENDOCRINOLOGY | Facility: CLINIC | Age: 58
End: 2023-07-05
Payer: COMMERCIAL

## 2023-07-06 ENCOUNTER — LAB VISIT (OUTPATIENT)
Dept: LAB | Facility: OTHER | Age: 58
End: 2023-07-06
Attending: INTERNAL MEDICINE
Payer: COMMERCIAL

## 2023-07-06 ENCOUNTER — OFFICE VISIT (OUTPATIENT)
Dept: INTERNAL MEDICINE | Facility: CLINIC | Age: 58
End: 2023-07-06
Attending: INTERNAL MEDICINE
Payer: COMMERCIAL

## 2023-07-06 VITALS
HEART RATE: 90 BPM | HEIGHT: 64 IN | OXYGEN SATURATION: 97 % | SYSTOLIC BLOOD PRESSURE: 132 MMHG | TEMPERATURE: 99 F | BODY MASS INDEX: 27.63 KG/M2 | DIASTOLIC BLOOD PRESSURE: 78 MMHG | WEIGHT: 161.81 LBS

## 2023-07-06 DIAGNOSIS — J02.9 SORE THROAT: ICD-10-CM

## 2023-07-06 DIAGNOSIS — E78.2 MIXED HYPERLIPIDEMIA: ICD-10-CM

## 2023-07-06 DIAGNOSIS — R73.03 PRE-DIABETES: ICD-10-CM

## 2023-07-06 DIAGNOSIS — M81.0 OSTEOPOROSIS, UNSPECIFIED OSTEOPOROSIS TYPE, UNSPECIFIED PATHOLOGICAL FRACTURE PRESENCE: ICD-10-CM

## 2023-07-06 DIAGNOSIS — I10 ESSENTIAL HYPERTENSION: ICD-10-CM

## 2023-07-06 DIAGNOSIS — Z00.00 ANNUAL PHYSICAL EXAM: ICD-10-CM

## 2023-07-06 DIAGNOSIS — N28.0 RENAL INFARCT: ICD-10-CM

## 2023-07-06 DIAGNOSIS — Z00.00 ANNUAL PHYSICAL EXAM: Primary | ICD-10-CM

## 2023-07-06 DIAGNOSIS — E04.2 MULTINODULAR GOITER: ICD-10-CM

## 2023-07-06 LAB
ALBUMIN SERPL BCP-MCNC: 3.8 G/DL (ref 3.5–5.2)
ALP SERPL-CCNC: 99 U/L (ref 55–135)
ALT SERPL W/O P-5'-P-CCNC: 54 U/L (ref 10–44)
ANION GAP SERPL CALC-SCNC: 7 MMOL/L (ref 8–16)
AST SERPL-CCNC: 41 U/L (ref 10–40)
BASOPHILS # BLD AUTO: 0.04 K/UL (ref 0–0.2)
BASOPHILS NFR BLD: 0.4 % (ref 0–1.9)
BILIRUB SERPL-MCNC: 0.3 MG/DL (ref 0.1–1)
BUN SERPL-MCNC: 13 MG/DL (ref 6–20)
CALCIUM SERPL-MCNC: 9.8 MG/DL (ref 8.7–10.5)
CHLORIDE SERPL-SCNC: 105 MMOL/L (ref 95–110)
CHOLEST SERPL-MCNC: 179 MG/DL (ref 120–199)
CHOLEST/HDLC SERPL: 4.7 {RATIO} (ref 2–5)
CO2 SERPL-SCNC: 29 MMOL/L (ref 23–29)
CREAT SERPL-MCNC: 0.8 MG/DL (ref 0.5–1.4)
CTP QC/QA: YES
DIFFERENTIAL METHOD: ABNORMAL
EOSINOPHIL # BLD AUTO: 0.1 K/UL (ref 0–0.5)
EOSINOPHIL NFR BLD: 0.7 % (ref 0–8)
ERYTHROCYTE [DISTWIDTH] IN BLOOD BY AUTOMATED COUNT: 14 % (ref 11.5–14.5)
EST. GFR  (NO RACE VARIABLE): >60 ML/MIN/1.73 M^2
ESTIMATED AVG GLUCOSE: 108 MG/DL (ref 68–131)
GLUCOSE SERPL-MCNC: 86 MG/DL (ref 70–110)
HBA1C MFR BLD: 5.4 % (ref 4–5.6)
HCT VFR BLD AUTO: 38.7 % (ref 37–48.5)
HDLC SERPL-MCNC: 38 MG/DL (ref 40–75)
HDLC SERPL: 21.2 % (ref 20–50)
HGB BLD-MCNC: 12.3 G/DL (ref 12–16)
IMM GRANULOCYTES # BLD AUTO: 0.03 K/UL (ref 0–0.04)
IMM GRANULOCYTES NFR BLD AUTO: 0.3 % (ref 0–0.5)
LDLC SERPL CALC-MCNC: 126.8 MG/DL (ref 63–159)
LYMPHOCYTES # BLD AUTO: 2.8 K/UL (ref 1–4.8)
LYMPHOCYTES NFR BLD: 28.5 % (ref 18–48)
MCH RBC QN AUTO: 23.9 PG (ref 27–31)
MCHC RBC AUTO-ENTMCNC: 31.8 G/DL (ref 32–36)
MCV RBC AUTO: 75 FL (ref 82–98)
MONOCYTES # BLD AUTO: 1.2 K/UL (ref 0.3–1)
MONOCYTES NFR BLD: 12.5 % (ref 4–15)
NEUTROPHILS # BLD AUTO: 5.6 K/UL (ref 1.8–7.7)
NEUTROPHILS NFR BLD: 57.6 % (ref 38–73)
NONHDLC SERPL-MCNC: 141 MG/DL
NRBC BLD-RTO: 0 /100 WBC
PLATELET # BLD AUTO: 340 K/UL (ref 150–450)
PMV BLD AUTO: 9.5 FL (ref 9.2–12.9)
POTASSIUM SERPL-SCNC: 3.7 MMOL/L (ref 3.5–5.1)
PROT SERPL-MCNC: 8.3 G/DL (ref 6–8.4)
RBC # BLD AUTO: 5.14 M/UL (ref 4–5.4)
SARS-COV-2 AG RESP QL IA.RAPID: NEGATIVE
SODIUM SERPL-SCNC: 141 MMOL/L (ref 136–145)
TRIGL SERPL-MCNC: 71 MG/DL (ref 30–150)
TSH SERPL DL<=0.005 MIU/L-ACNC: 0.67 UIU/ML (ref 0.4–4)
WBC # BLD AUTO: 9.75 K/UL (ref 3.9–12.7)

## 2023-07-06 PROCEDURE — 1160F PR REVIEW ALL MEDS BY PRESCRIBER/CLIN PHARMACIST DOCUMENTED: ICD-10-PCS | Mod: CPTII,S$GLB,, | Performed by: INTERNAL MEDICINE

## 2023-07-06 PROCEDURE — 1159F MED LIST DOCD IN RCRD: CPT | Mod: CPTII,S$GLB,, | Performed by: INTERNAL MEDICINE

## 2023-07-06 PROCEDURE — 3075F SYST BP GE 130 - 139MM HG: CPT | Mod: CPTII,S$GLB,, | Performed by: INTERNAL MEDICINE

## 2023-07-06 PROCEDURE — 3008F BODY MASS INDEX DOCD: CPT | Mod: CPTII,S$GLB,, | Performed by: INTERNAL MEDICINE

## 2023-07-06 PROCEDURE — 80061 LIPID PANEL: CPT | Performed by: INTERNAL MEDICINE

## 2023-07-06 PROCEDURE — 3078F DIAST BP <80 MM HG: CPT | Mod: CPTII,S$GLB,, | Performed by: INTERNAL MEDICINE

## 2023-07-06 PROCEDURE — 87811 SARS-COV-2 COVID19 W/OPTIC: CPT | Mod: QW,S$GLB,, | Performed by: INTERNAL MEDICINE

## 2023-07-06 PROCEDURE — 80053 COMPREHEN METABOLIC PANEL: CPT | Performed by: INTERNAL MEDICINE

## 2023-07-06 PROCEDURE — 4010F ACE/ARB THERAPY RXD/TAKEN: CPT | Mod: CPTII,S$GLB,, | Performed by: INTERNAL MEDICINE

## 2023-07-06 PROCEDURE — 83036 HEMOGLOBIN GLYCOSYLATED A1C: CPT | Performed by: INTERNAL MEDICINE

## 2023-07-06 PROCEDURE — 3044F HG A1C LEVEL LT 7.0%: CPT | Mod: CPTII,S$GLB,, | Performed by: INTERNAL MEDICINE

## 2023-07-06 PROCEDURE — 3078F PR MOST RECENT DIASTOLIC BLOOD PRESSURE < 80 MM HG: ICD-10-PCS | Mod: CPTII,S$GLB,, | Performed by: INTERNAL MEDICINE

## 2023-07-06 PROCEDURE — 3008F PR BODY MASS INDEX (BMI) DOCUMENTED: ICD-10-PCS | Mod: CPTII,S$GLB,, | Performed by: INTERNAL MEDICINE

## 2023-07-06 PROCEDURE — 1159F PR MEDICATION LIST DOCUMENTED IN MEDICAL RECORD: ICD-10-PCS | Mod: CPTII,S$GLB,, | Performed by: INTERNAL MEDICINE

## 2023-07-06 PROCEDURE — 99999 PR PBB SHADOW E&M-EST. PATIENT-LVL IV: ICD-10-PCS | Mod: PBBFAC,,, | Performed by: INTERNAL MEDICINE

## 2023-07-06 PROCEDURE — 36415 COLL VENOUS BLD VENIPUNCTURE: CPT | Performed by: INTERNAL MEDICINE

## 2023-07-06 PROCEDURE — 87811 SARS CORONAVIRUS 2 ANTIGEN POCT, MANUAL READ: ICD-10-PCS | Mod: QW,S$GLB,, | Performed by: INTERNAL MEDICINE

## 2023-07-06 PROCEDURE — 99999 PR PBB SHADOW E&M-EST. PATIENT-LVL IV: CPT | Mod: PBBFAC,,, | Performed by: INTERNAL MEDICINE

## 2023-07-06 PROCEDURE — 84443 ASSAY THYROID STIM HORMONE: CPT | Performed by: INTERNAL MEDICINE

## 2023-07-06 PROCEDURE — 1160F RVW MEDS BY RX/DR IN RCRD: CPT | Mod: CPTII,S$GLB,, | Performed by: INTERNAL MEDICINE

## 2023-07-06 PROCEDURE — 3044F PR MOST RECENT HEMOGLOBIN A1C LEVEL <7.0%: ICD-10-PCS | Mod: CPTII,S$GLB,, | Performed by: INTERNAL MEDICINE

## 2023-07-06 PROCEDURE — 85025 COMPLETE CBC W/AUTO DIFF WBC: CPT | Performed by: INTERNAL MEDICINE

## 2023-07-06 PROCEDURE — 99396 PREV VISIT EST AGE 40-64: CPT | Mod: S$GLB,,, | Performed by: INTERNAL MEDICINE

## 2023-07-06 PROCEDURE — 99396 PR PREVENTIVE VISIT,EST,40-64: ICD-10-PCS | Mod: S$GLB,,, | Performed by: INTERNAL MEDICINE

## 2023-07-06 PROCEDURE — 3075F PR MOST RECENT SYSTOLIC BLOOD PRESS GE 130-139MM HG: ICD-10-PCS | Mod: CPTII,S$GLB,, | Performed by: INTERNAL MEDICINE

## 2023-07-06 PROCEDURE — 4010F PR ACE/ARB THEARPY RXD/TAKEN: ICD-10-PCS | Mod: CPTII,S$GLB,, | Performed by: INTERNAL MEDICINE

## 2023-07-06 NOTE — PROGRESS NOTES
"Subjective:       Patient ID: Pastora Cota is a 57 y.o. female.    Chief Complaint: Fever (Post biopsy - on Thursday; happening every AM. Taking Tylenol.) and Annual Exam    Here for urgent visit    Left sided pain since Bx and low grade temperature. Wakes with a HA. Feeling run down. Mild sore throat. Patient denies F/C, SOB, chest tightness, eye pain, severe headache, inability to maintain adequate PO intake, significant loss of taste or smell, abdominal pain, nausea/vomiting, or diarrhea.        Review of Systems   Constitutional:  Negative for chills, fatigue, fever and unexpected weight change.   HENT:  Negative for ear pain, hearing loss, postnasal drip, tinnitus, trouble swallowing and voice change.    Respiratory:  Negative for cough, chest tightness, shortness of breath and wheezing.    Cardiovascular:  Negative for chest pain, palpitations and leg swelling.   Gastrointestinal:  Negative for abdominal pain, blood in stool, diarrhea, nausea and vomiting.   Endocrine: Negative for polydipsia, polyphagia and polyuria.   Genitourinary:  Negative for difficulty urinating, dysuria, hematuria and vaginal bleeding.   Skin:  Negative for rash.   Allergic/Immunologic: Negative for food allergies.   Neurological:  Negative for dizziness, numbness and headaches.   Hematological:  Does not bruise/bleed easily.   Psychiatric/Behavioral:  The patient is not nervous/anxious.      Objective:      Vitals:    07/06/23 1115   BP: 132/78   Pulse: 90   Temp: 98.6 °F (37 °C)   SpO2: 97%   Weight: 73.4 kg (161 lb 13.1 oz)   Height: 5' 4" (1.626 m)      Physical Exam  Neck:      Thyroid: No thyroid mass or thyroid tenderness.      Vascular: No carotid bruit.   Musculoskeletal:      Cervical back: No edema, erythema, rigidity or torticollis. No pain with movement or spinous process tenderness.   Lymphadenopathy:      Cervical: No cervical adenopathy.      Right cervical: No superficial or deep cervical adenopathy.     Left " cervical: No superficial or deep cervical adenopathy.       Assessment:       1. Annual physical exam    2. Mixed hyperlipidemia    3. Essential hypertension    4. Pre-diabetes    5. Osteoporosis, unspecified osteoporosis type, unspecified pathological fracture presence    6. Multinodular goiter    7. Sore throat    8. Renal infarct        Plan:       Pastora was seen today for fever and annual exam.    Diagnoses and all orders for this visit:    Annual physical exam  -     Comprehensive Metabolic Panel; Future  -     Lipid Panel; Future  -     TSH; Future  -     CBC Auto Differential; Future  -     Hemoglobin A1C; Future    Mixed hyperlipidemia  -     Comprehensive Metabolic Panel; Future  -     Lipid Panel; Future    Essential hypertension   Controlled and asymptomatic.  Continue current Rx regimen.    Pre-diabetes   Drastic reduction in simple sugar and complex carbohydrates consumption in addition to weight loss and regular exercise are necessary to avoid this worsening and becoming a diabetic and being at risk for all its complications.      Osteoporosis, unspecified osteoporosis type, unspecified pathological fracture presence    Multinodular goiter   Path pending.    Sore throat  Exam benign  -     SARS Coronavirus 2 Antigen, POCT Manual Read; Future    Renal infarct           Kiko Gutierrez MD  Internal Medicine-[facility]        Side effects of medication(s) were discussed in detail and patient voiced understanding.  Patient will call back for any issues or complications.

## 2023-07-07 ENCOUNTER — PATIENT MESSAGE (OUTPATIENT)
Dept: INTERNAL MEDICINE | Facility: CLINIC | Age: 58
End: 2023-07-07
Payer: COMMERCIAL

## 2023-07-07 NOTE — PROGRESS NOTES
I have reviewed your recent lab work.  Your labs look good, except:    1) AST and ALT are slightly elevated. I am not worried about this but in an abundance of caution if you feel worse please let me know and we will repeat.     Your kidney function is normal.  You do not have diabetes.  Your thyroid studies are normal.  Your cholesterol levels look good.    If you have any questions or concerns about particular lab results please notify me via [facility] messaging or by calling our office at [phone].    Respectfully,  Kiko Gutierrez

## 2023-07-11 ENCOUNTER — LAB VISIT (OUTPATIENT)
Dept: LAB | Facility: HOSPITAL | Age: 58
End: 2023-07-11
Attending: INTERNAL MEDICINE
Payer: COMMERCIAL

## 2023-07-11 ENCOUNTER — TELEPHONE (OUTPATIENT)
Dept: INTERNAL MEDICINE | Facility: CLINIC | Age: 58
End: 2023-07-11
Payer: COMMERCIAL

## 2023-07-11 ENCOUNTER — PATIENT MESSAGE (OUTPATIENT)
Dept: INTERNAL MEDICINE | Facility: CLINIC | Age: 58
End: 2023-07-11
Payer: COMMERCIAL

## 2023-07-11 ENCOUNTER — OFFICE VISIT (OUTPATIENT)
Dept: OPTOMETRY | Facility: CLINIC | Age: 58
End: 2023-07-11
Payer: COMMERCIAL

## 2023-07-11 DIAGNOSIS — H04.123 DRY EYE SYNDROME OF BOTH EYES: ICD-10-CM

## 2023-07-11 DIAGNOSIS — H52.4 BILATERAL PRESBYOPIA: Primary | ICD-10-CM

## 2023-07-11 DIAGNOSIS — H43.393 VISUAL FLOATERS, BILATERAL: ICD-10-CM

## 2023-07-11 DIAGNOSIS — N30.90 BLADDER INFECTION: Primary | ICD-10-CM

## 2023-07-11 DIAGNOSIS — H25.13 NUCLEAR SCLEROSIS OF BOTH EYES: ICD-10-CM

## 2023-07-11 DIAGNOSIS — N30.90 BLADDER INFECTION: ICD-10-CM

## 2023-07-11 LAB
BACTERIA #/AREA URNS AUTO: ABNORMAL /HPF
BILIRUB UR QL STRIP: NEGATIVE
CLARITY UR REFRACT.AUTO: CLEAR
COLOR UR AUTO: YELLOW
GLUCOSE UR QL STRIP: NEGATIVE
HGB UR QL STRIP: NEGATIVE
KETONES UR QL STRIP: NEGATIVE
LEUKOCYTE ESTERASE UR QL STRIP: ABNORMAL
MICROSCOPIC COMMENT: ABNORMAL
NITRITE UR QL STRIP: NEGATIVE
PH UR STRIP: 6 [PH] (ref 5–8)
PROT UR QL STRIP: ABNORMAL
RBC #/AREA URNS AUTO: 1 /HPF (ref 0–4)
SP GR UR STRIP: 1.02 (ref 1–1.03)
SQUAMOUS #/AREA URNS AUTO: 12 /HPF
URN SPEC COLLECT METH UR: ABNORMAL
WBC #/AREA URNS AUTO: 12 /HPF (ref 0–5)
WBC CLUMPS UR QL AUTO: ABNORMAL
YEAST UR QL AUTO: ABNORMAL

## 2023-07-11 PROCEDURE — 99999 PR PBB SHADOW E&M-EST. PATIENT-LVL III: CPT | Mod: PBBFAC,,, | Performed by: OPTOMETRIST

## 2023-07-11 PROCEDURE — 92014 PR EYE EXAM, EST PATIENT,COMPREHESV: ICD-10-PCS | Mod: S$GLB,,, | Performed by: OPTOMETRIST

## 2023-07-11 PROCEDURE — 92014 COMPRE OPH EXAM EST PT 1/>: CPT | Mod: S$GLB,,, | Performed by: OPTOMETRIST

## 2023-07-11 PROCEDURE — 99999 PR PBB SHADOW E&M-EST. PATIENT-LVL III: ICD-10-PCS | Mod: PBBFAC,,, | Performed by: OPTOMETRIST

## 2023-07-11 PROCEDURE — 87086 URINE CULTURE/COLONY COUNT: CPT | Performed by: INTERNAL MEDICINE

## 2023-07-11 PROCEDURE — 81001 URINALYSIS AUTO W/SCOPE: CPT | Performed by: INTERNAL MEDICINE

## 2023-07-11 RX ORDER — FLUCONAZOLE 150 MG/1
150 TABLET ORAL ONCE
COMMUNITY
Start: 2023-04-07

## 2023-07-11 NOTE — TELEPHONE ENCOUNTER
----- Message from Reyes Perez sent at 7/11/2023  4:54 PM CDT -----  Contact: Patient  Type:  Patient Call          Who Called: Patient         Does the patient know what this is regarding?: Requesting a call back from a missed call ;please advise           Would the patient rather a call back or a response via [facility]?call           Best Call Back Number:[phone]             Additional Information:

## 2023-07-11 NOTE — PROGRESS NOTES
MARIAELENA    LINDA: [birth year]  Chief complaint (CC): patient is here for annual eye exam today.  Patient   hasn't noticed any vision changes since the last exam.  Wears OTC readers   but may want prescription glasses.  Patient has had floaters OD >OS for a   few years and they are unchanged.  Glasses? +1.75 or +2.00 OTC readers  Contacts? -  H/o eye surgery, injections or laser: -  H/o eye injury: -  Known eye conditions? See above  Family h/o eye conditions? -  Eye gtts? Systane once a day      (-) Flashes (+)  Floaters (-) Mucous   (+)  Tearing (-) Itching (+) Burning   (-) Headaches (-) Eye Pain/discomfort (-) Irritation   (-)  Redness (-) Double vision (-) Blurry vision    Diabetic? -  A1c? -      Last edited by Winnie Cramer on 7/11/2023  3:42 PM.            Assessment /Plan     For exam results, see Encounter Report.      Bilateral presbyopia  Cont OtC readers     Visual floaters, bilateral  No e/o h/b/t 360 degrees OU. Monitor for worsening of symptoms or S/Sx of RD.     Dry eye syndrome of both eyes  Recommend Systane Ultra or Refresh Optive TID-QID OU to aid with symptoms of dry eyes.    Nuclear sclerosis of both eyes  Nuclear sclerotic cataract - not visually significant. Observe.

## 2023-07-13 LAB
BACTERIA UR CULT: NO GROWTH
COMMENT: ABNORMAL
FINAL PATHOLOGIC DIAGNOSIS: ABNORMAL
Lab: ABNORMAL
MICROSCOPIC EXAM: ABNORMAL

## 2023-07-20 ENCOUNTER — PATIENT MESSAGE (OUTPATIENT)
Dept: ENDOCRINOLOGY | Facility: CLINIC | Age: 58
End: 2023-07-20
Payer: COMMERCIAL

## 2023-08-09 ENCOUNTER — PATIENT MESSAGE (OUTPATIENT)
Dept: ENDOCRINOLOGY | Facility: CLINIC | Age: 58
End: 2023-08-09
Payer: COMMERCIAL

## 2023-08-16 ENCOUNTER — PATIENT MESSAGE (OUTPATIENT)
Dept: ADMINISTRATIVE | Facility: OTHER | Age: 58
End: 2023-08-16
Payer: COMMERCIAL

## 2023-09-20 ENCOUNTER — PATIENT MESSAGE (OUTPATIENT)
Dept: ENDOCRINOLOGY | Facility: CLINIC | Age: 58
End: 2023-09-20
Payer: COMMERCIAL

## 2023-11-12 DIAGNOSIS — I10 HYPERTENSION, UNSPECIFIED TYPE: ICD-10-CM

## 2023-11-12 DIAGNOSIS — I10 ESSENTIAL HYPERTENSION: ICD-10-CM

## 2023-11-12 NOTE — TELEPHONE ENCOUNTER
No care due was identified.  [facility] Embedded Care Due Messages. Reference number: [id number].   11/12/2023 1:14:52 PM CST

## 2023-11-13 RX ORDER — AMLODIPINE BESYLATE 10 MG/1
10 TABLET ORAL DAILY
Qty: 90 TABLET | Refills: 2 | Status: SHIPPED | OUTPATIENT
Start: 2023-11-13

## 2023-11-13 RX ORDER — FUROSEMIDE 20 MG/1
20 TABLET ORAL DAILY
Qty: 90 TABLET | Refills: 2 | Status: SHIPPED | OUTPATIENT
Start: 2023-11-13

## 2023-11-13 RX ORDER — METOPROLOL TARTRATE 25 MG/1
12.5 TABLET, FILM COATED ORAL 2 TIMES DAILY
Qty: 90 TABLET | Refills: 2 | Status: SHIPPED | OUTPATIENT
Start: 2023-11-13

## 2023-11-13 RX ORDER — LOSARTAN POTASSIUM 100 MG/1
100 TABLET ORAL NIGHTLY
Qty: 90 TABLET | Refills: 2 | Status: SHIPPED | OUTPATIENT
Start: 2023-11-13

## 2023-11-14 ENCOUNTER — PATIENT MESSAGE (OUTPATIENT)
Dept: ADMINISTRATIVE | Facility: HOSPITAL | Age: 58
End: 2023-11-14
Payer: COMMERCIAL

## 2023-11-14 ENCOUNTER — OFFICE VISIT (OUTPATIENT)
Dept: ENDOCRINOLOGY | Facility: CLINIC | Age: 58
End: 2023-11-14
Payer: COMMERCIAL

## 2023-11-14 DIAGNOSIS — E04.2 MULTINODULAR GOITER: Primary | ICD-10-CM

## 2023-11-14 DIAGNOSIS — M81.0 OSTEOPOROSIS WITHOUT CURRENT PATHOLOGICAL FRACTURE, UNSPECIFIED OSTEOPOROSIS TYPE: ICD-10-CM

## 2023-11-14 PROCEDURE — 4010F ACE/ARB THERAPY RXD/TAKEN: CPT | Mod: CPTII,95,, | Performed by: INTERNAL MEDICINE

## 2023-11-14 PROCEDURE — 1159F MED LIST DOCD IN RCRD: CPT | Mod: CPTII,95,, | Performed by: INTERNAL MEDICINE

## 2023-11-14 PROCEDURE — 1160F PR REVIEW ALL MEDS BY PRESCRIBER/CLIN PHARMACIST DOCUMENTED: ICD-10-PCS | Mod: CPTII,95,, | Performed by: INTERNAL MEDICINE

## 2023-11-14 PROCEDURE — 1160F RVW MEDS BY RX/DR IN RCRD: CPT | Mod: CPTII,95,, | Performed by: INTERNAL MEDICINE

## 2023-11-14 PROCEDURE — 4010F PR ACE/ARB THEARPY RXD/TAKEN: ICD-10-PCS | Mod: CPTII,95,, | Performed by: INTERNAL MEDICINE

## 2023-11-14 PROCEDURE — 99214 OFFICE O/P EST MOD 30 MIN: CPT | Mod: 95,,, | Performed by: INTERNAL MEDICINE

## 2023-11-14 PROCEDURE — 1159F PR MEDICATION LIST DOCUMENTED IN MEDICAL RECORD: ICD-10-PCS | Mod: CPTII,95,, | Performed by: INTERNAL MEDICINE

## 2023-11-14 PROCEDURE — 3044F HG A1C LEVEL LT 7.0%: CPT | Mod: CPTII,95,, | Performed by: INTERNAL MEDICINE

## 2023-11-14 PROCEDURE — 99214 PR OFFICE/OUTPT VISIT, EST, LEVL IV, 30-39 MIN: ICD-10-PCS | Mod: 95,,, | Performed by: INTERNAL MEDICINE

## 2023-11-14 PROCEDURE — 3044F PR MOST RECENT HEMOGLOBIN A1C LEVEL <7.0%: ICD-10-PCS | Mod: CPTII,95,, | Performed by: INTERNAL MEDICINE

## 2023-11-14 NOTE — ASSESSMENT & PLAN NOTE
Plan repeat FNA and hold for molecular markers.  We did have a discussion regarding surgery, her preferences not to pursue surgery at this point unless absolutely necessary.

## 2023-11-14 NOTE — PROGRESS NOTES
Subjective:      Patient ID: Pastora Cota is a 58 y.o. female.    Chief Complaint:  MNG and osteoporosis      History of Present Illness     With regards to the MNG    In 2011 she presented with palpitations   She had a ct of the chest  The goiter was noted at that time  She had an u/s done and was advised to f/u     Last thyroid ultrasound    5/2/23    RECOMMENDATIONS:  Recommend FNA of the Left middle lobe 2.0 cm nodule due to interval increase in size and internal microcalcifications.        fna done 6/5/19    THYROID, LEFT INFERIOR NODULE, FINE-NEEDLE ASPIRATION:  Satisfactory for interpretation  [facility] thyroid cytology category: Benign  Benign follicular epithelial cells, abundant macrophages and watery colloid consistent with benign colloid nodule  with cystic degeneration      fna done 8/26/20    Thyroid, left inferior (fine needle aspiration):   [facility] Thyroid Cytology Category: Benign  Consistent with a benign   follicular nodule.   Other findings and comments:   Benign follicular epithelial cells.   Blood present.   Colloid present.     Fna done 6/29/23  Thyroid, left mid, fine-needle aspiration:   [facility] Thyroid Cytology Category: Follicular Lesion of Undetermined Significance (FLUS)       she does not notice the goiter  No voice changes, no difficulty swallowing or breathing  no FH of thyroid disease or disease   no h/o radiation treatment or exposure  Did have light therapy for acne         With regards to   igt  ....   Denies symptoms of hyperglycemia such as polyuria, polydipsia, nocturia, unexplained weight loss or blurred vision    Weight is stable           With regards to the Osteoporosis     dx on bmd 4/25/19  Impression       Osteoporosis of the distal 1/3 forearm       Last bmd 5/2/23    Impression:     *Low bone mineral density  *Fracture risk is low  *Compared with previous DXA, BMD at the lumbar spine has increased 7%, and BMD at the total hip has  remained stable..      No recent falls or fractures     With regards to her h/o Primary hyperparathyroidism       s/p right inferior parathyroidectomy on 12/11/19 for primary hyperparathyroidism - Dr Wilson          Review of Systems  As above     Objective:   Physical Exam  Psychiatric:         Attention and Perception: Attention normal.         Mood and Affect: Mood normal.         Speech: Speech normal.         Behavior: Behavior normal.         Thought Content: Thought content normal.         Judgment: Judgment normal.         There is no height or weight on file to calculate BMI.    Lab Review:   Lab Results   Component Value Date    HGBA1C 5.4 07/06/2023     Lab Results   Component Value Date    CHOL 179 07/06/2023    HDL 38 (L) 07/06/2023    LDLCALC 126.8 07/06/2023    TRIG 71 07/06/2023    CHOLHDL 21.2 07/06/2023     Lab Results   Component Value Date     07/06/2023    K 3.7 07/06/2023     07/06/2023    CO2 29 07/06/2023    GLU 86 07/06/2023    BUN 13 07/06/2023    CREATININE 0.8 07/06/2023    CALCIUM 9.8 07/06/2023    PROT 8.3 07/06/2023    ALBUMIN 3.8 07/06/2023    BILITOT 0.3 07/06/2023    ALKPHOS 99 07/06/2023    AST 41 (H) 07/06/2023    ALT 54 (H) 07/06/2023    ANIONGAP 7 (L) 07/06/2023    ESTGFRAFRICA >60 05/19/2022    EGFRNONAA 55 (A) 05/19/2022    TSH 0.668 07/06/2023       Assessment and Plan     Multinodular goiter  Plan repeat FNA and hold for molecular markers.  We did have a discussion regarding surgery, her preferences not to pursue surgery at this point unless absolutely necessary.    Osteoporosis  Much improved after parathyroidectomy.  We will follow bone density every 2 years.  Currently no indication to treat      The patient location is: home  The chief complaint leading to consultation is: above    Visit type: audiovisual    Face to Face time with patient: 30 minutes of total time spent on the encounter, which includes face to face time and non-face to face time preparing  to see the patient (eg, review of tests), Obtaining and/or reviewing separately obtained history, Documenting clinical information in the electronic or other health record, Independently interpreting results (not separately reported) and communicating results to the patient/family/caregiver, or Care coordination (not separately reported).         Each patient to whom he or she provides medical services by telemedicine is:  (1) informed of the relationship between the physician and patient and the respective role of any other health care provider with respect to management of the patient; and (2) notified that he or she may decline to receive medical services by telemedicine and may withdraw from such care at any time.

## 2023-11-14 NOTE — ASSESSMENT & PLAN NOTE
Much improved after parathyroidectomy.  We will follow bone density every 2 years.  Currently no indication to treat

## 2023-11-14 NOTE — PATIENT INSTRUCTIONS
Thank you for completing a virtual visit with me!     Per our conversation, we will set up a repeat biopsy of the thyroid nodule.  We will also hold a sample for molecular markers.  Results of these tests will determine next steps       Please let me know if you have any other questions.    Thank you,  Valerie Kaba MD

## 2023-11-15 ENCOUNTER — PATIENT OUTREACH (OUTPATIENT)
Dept: ADMINISTRATIVE | Facility: HOSPITAL | Age: 58
End: 2023-11-15
Payer: COMMERCIAL

## 2023-11-15 DIAGNOSIS — Z12.31 ENCOUNTER FOR SCREENING MAMMOGRAM FOR BREAST CANCER: Primary | ICD-10-CM

## 2024-01-16 ENCOUNTER — HOSPITAL ENCOUNTER (OUTPATIENT)
Dept: RADIOLOGY | Facility: OTHER | Age: 59
Discharge: HOME | End: 2024-01-16
Attending: INTERNAL MEDICINE
Payer: COMMERCIAL

## 2024-01-16 VITALS — BODY MASS INDEX: 27.63 KG/M2 | WEIGHT: 161.81 LBS | HEIGHT: 64 IN

## 2024-01-16 DIAGNOSIS — Z12.31 ENCOUNTER FOR SCREENING MAMMOGRAM FOR BREAST CANCER: ICD-10-CM

## 2024-01-16 PROCEDURE — 77067 SCR MAMMO BI INCL CAD: CPT | Mod: 26,,, | Performed by: RADIOLOGY

## 2024-01-16 PROCEDURE — 77067 SCR MAMMO BI INCL CAD: CPT | Mod: TC

## 2024-01-16 PROCEDURE — 77063 BREAST TOMOSYNTHESIS BI: CPT | Mod: 26,,, | Performed by: RADIOLOGY

## 2024-02-26 DIAGNOSIS — I10 ESSENTIAL HYPERTENSION: ICD-10-CM

## 2024-02-26 RX ORDER — SPIRONOLACTONE 25 MG/1
25 TABLET ORAL
Qty: 90 TABLET | Refills: 1 | Status: SHIPPED | OUTPATIENT
Start: 2024-02-26

## 2024-02-26 NOTE — TELEPHONE ENCOUNTER
No care due was identified.  [facility] Embedded Care Due Messages. Reference number: [id number].   2/26/2024 7:00:57 AM CST

## 2024-04-15 RX ORDER — SCOLOPAMINE TRANSDERMAL SYSTEM 1 MG/1
1 PATCH, EXTENDED RELEASE TRANSDERMAL
Status: CANCELLED | OUTPATIENT
Start: 2024-04-15

## 2024-04-15 RX ORDER — SCOLOPAMINE TRANSDERMAL SYSTEM 1 MG/1
1 PATCH, EXTENDED RELEASE TRANSDERMAL
Qty: 10 PATCH | Refills: 0 | Status: SHIPPED | OUTPATIENT
Start: 2024-04-15

## 2024-04-15 NOTE — TELEPHONE ENCOUNTER
Message  Received: Today  Yonatan Abel Staff  Caller: Unspecified (Today, 10:20 AM)  Name of Who is Calling:SID GARCIA [[id number]]                   What is the request in detail:PT says her cruise is 14 days _  to [place]

## 2024-04-15 NOTE — TELEPHONE ENCOUNTER
----- Message from Stefanie Plata sent at 4/12/2024  4:08 PM CDT -----  Regarding: Rx request  Type: Patient Call Back    Who called:    What is the request in detail: calling b/c she is going on a cruise and she would like a rx for a motion sickness patch. She would like rx sent to   [facility] [address] [place] [address] [address]  [address] [facility] [address]  Phone: [phone] Fax: [phone]    Please call to assist.     Can the clinic reply by [facility]? Yes     Would the patient rather a call back or a response via [facility]? Either or     Best call back number:  [phone]    Additional Information:

## 2024-04-15 NOTE — TELEPHONE ENCOUNTER
Left message on voicemail to see how long of a cruise she's going on so that we can send the correct amount of patches.

## 2024-04-15 NOTE — TELEPHONE ENCOUNTER
Care Due:                  Date            Visit Type   Department     Provider  --------------------------------------------------------------------------------                                EP -                              PRIMARY      [facility] INTERNAL  Last Visit: 07-      CARE ([facility])   MEDICINE       Kiko Koch  Next Visit: None Scheduled  None         None Found                                                            Last  Test          Frequency    Reason                     Performed    Due Date  --------------------------------------------------------------------------------    CMP.........  12 months..  furosemide, losartan,      07- 06-                             spironolactone...........    [employer] Embedded Care Due Messages. Reference number: [id number].   4/15/2024 10:39:04 AM CDT

## 2024-05-22 ENCOUNTER — OFFICE VISIT (OUTPATIENT)
Dept: URGENT CARE | Facility: CLINIC | Age: 59
End: 2024-05-22
Payer: COMMERCIAL

## 2024-05-22 VITALS
BODY MASS INDEX: 28.16 KG/M2 | DIASTOLIC BLOOD PRESSURE: 92 MMHG | OXYGEN SATURATION: 96 % | HEIGHT: 65 IN | WEIGHT: 169 LBS | TEMPERATURE: 98 F | HEART RATE: 74 BPM | SYSTOLIC BLOOD PRESSURE: 166 MMHG | RESPIRATION RATE: 17 BRPM

## 2024-05-22 DIAGNOSIS — J32.9 RHINOSINUSITIS: Primary | ICD-10-CM

## 2024-05-22 LAB
CTP QC/QA: YES
CTP QC/QA: YES
POC MOLECULAR INFLUENZA A AGN: NEGATIVE
POC MOLECULAR INFLUENZA B AGN: NEGATIVE
SARS-COV-2 RDRP RESP QL NAA+PROBE: NEGATIVE

## 2024-05-22 PROCEDURE — 99213 OFFICE O/P EST LOW 20 MIN: CPT | Mod: S$GLB,,, | Performed by: NURSE PRACTITIONER

## 2024-05-22 PROCEDURE — 87502 INFLUENZA DNA AMP PROBE: CPT | Mod: QW,S$GLB,, | Performed by: NURSE PRACTITIONER

## 2024-05-22 PROCEDURE — 87635 SARS-COV-2 COVID-19 AMP PRB: CPT | Mod: QW,S$GLB,, | Performed by: NURSE PRACTITIONER

## 2024-05-22 RX ORDER — DEXTROMETHORPHAN HYDROBROMIDE AND GUAIFENESIN 10; 200 MG/1; MG/1
1 CAPSULE, GELATIN COATED ORAL 3 TIMES DAILY
Qty: 1 EACH | Refills: 0 | Status: SHIPPED | OUTPATIENT
Start: 2024-05-22 | End: 2024-06-01

## 2024-05-22 RX ORDER — MONTELUKAST SODIUM 10 MG/1
10 TABLET ORAL NIGHTLY
Qty: 30 TABLET | Refills: 0 | Status: SHIPPED | OUTPATIENT
Start: 2024-05-22 | End: 2024-06-21

## 2024-05-22 RX ORDER — METHYLPREDNISOLONE 4 MG/1
TABLET ORAL
Qty: 21 EACH | Refills: 0 | Status: SHIPPED | OUTPATIENT
Start: 2024-05-22 | End: 2024-06-12

## 2024-05-22 NOTE — PROGRESS NOTES
"Subjective:      Patient ID: Pastora Cota is a 58 y.o. female.    Vitals:  height is 5' 5" (1.651 m) and weight is 76.7 kg (169 lb). Her oral temperature is 98.2 °F (36.8 °C). Her blood pressure is 166/92 (abnormal) and her pulse is 74. Her respiration is 17 and oxygen saturation is 96%.     Chief Complaint: Headache    Headache started Sunday. Patient taken mucinex and otc cough/chest congestion dm since Sunday with minimal relief. Sinus headache since Sunday which has led to dry cough and soreness of throat.     Headache   This is a new problem. The current episode started in the past 7 days. The problem occurs daily. The problem has been waxing and waning. The quality of the pain is described as pulsating. The pain is at a severity of 7/10. The pain is moderate. Associated symptoms include nausea, sinus pressure and a sore throat. Pertinent negatives include no blurred vision, dizziness, ear pain, eye pain or fever. She has tried NSAIDs for the symptoms. The treatment provided mild relief.       Constitution: Negative for fever.   HENT:  Positive for sinus pressure and sore throat. Negative for ear pain.    Neck: neck negative.   Cardiovascular: Negative.    Eyes:  Negative for eye pain and blurred vision.   Gastrointestinal:  Positive for nausea.   Neurological:  Positive for headaches. Negative for dizziness.      Objective:     Physical Exam   Constitutional: No distress.   HENT:   Head: Normocephalic.   Ears:   Right Ear: Tympanic membrane, external ear and ear canal normal.   Left Ear: Tympanic membrane, external ear and ear canal normal.   Nose: Nose normal.   Mouth/Throat: Mucous membranes are moist. Oropharynx is clear.   Abdominal: Normal appearance.   Neurological: She is alert.   Skin: Skin is warm and dry.       Assessment:     1. Rhinosinusitis        Plan:       Rhinosinusitis  -     POCT Influenza A/B MOLECULAR  -     POCT COVID-19 Rapid Screening  -     dextromethorphan-guaiFENesin " (CORICIDIN HBP CHEST DOUGLAS-COUGH)  mg Cap; Take 1 capsule by mouth 3 (three) times daily. for 10 days  Dispense: 1 each; Refill: 0  -     montelukast (SINGULAIR) 10 mg tablet; Take 1 tablet (10 mg total) by mouth every evening.  Dispense: 30 tablet; Refill: 0  -     methylPREDNISolone (MEDROL DOSEPACK) 4 mg tablet; use as directed  Dispense: 21 each; Refill: 0      Results for orders placed or performed in visit on 05/22/24   POCT Influenza A/B MOLECULAR   Result Value Ref Range    POC Molecular Influenza A Ag Negative Negative    POC Molecular Influenza B Ag Negative Negative     Acceptable Yes    POCT COVID-19 Rapid Screening   Result Value Ref Range    POC Rapid COVID Negative Negative     Acceptable Yes      Patient Instructions   Use a salt water or saline rinse in your nose. Talk to your doctor about how often you should do this.  Hold a warm cloth to your sinuses for a few minutes. Then, hold a cold cloth to your sinuses for 30 seconds. Repeat a few times a day.  Take a hot bath or shower. Breathe in steam from a bowl of hot water or hot shower. This moist air can help the headache.  Drink 6 to 8 glasses of water each day.  Avoid beer, wine, and mixed drinks (alcohol). This can make the nose and sinuses swell.  Do not smoke and avoid areas where people smoke  Follow up with PCP for htn management with 1-2 wks

## 2024-05-22 NOTE — PATIENT INSTRUCTIONS
Use a salt water or saline rinse in your nose. Talk to your doctor about how often you should do this.  Hold a warm cloth to your sinuses for a few minutes. Then, hold a cold cloth to your sinuses for 30 seconds. Repeat a few times a day.  Take a hot bath or shower. Breathe in steam from a bowl of hot water or hot shower. This moist air can help the headache.  Drink 6 to 8 glasses of water each day.  Avoid beer, wine, and mixed drinks (alcohol). This can make the nose and sinuses swell.  Do not smoke and avoid areas where people smoke  Follow up with PCP for htn management with 1-2 wks

## 2024-05-23 ENCOUNTER — TELEPHONE (OUTPATIENT)
Dept: URGENT CARE | Facility: CLINIC | Age: 59
End: 2024-05-23
Payer: COMMERCIAL

## 2024-06-12 ENCOUNTER — OFFICE VISIT (OUTPATIENT)
Dept: OBSTETRICS AND GYNECOLOGY | Facility: CLINIC | Age: 59
End: 2024-06-12
Attending: OBSTETRICS & GYNECOLOGY
Payer: COMMERCIAL

## 2024-06-12 VITALS
WEIGHT: 165 LBS | HEART RATE: 94 BPM | DIASTOLIC BLOOD PRESSURE: 85 MMHG | SYSTOLIC BLOOD PRESSURE: 135 MMHG | BODY MASS INDEX: 27.49 KG/M2 | HEIGHT: 65 IN

## 2024-06-12 DIAGNOSIS — Z01.419 ENCOUNTER FOR GYNECOLOGICAL EXAMINATION WITHOUT ABNORMAL FINDING: ICD-10-CM

## 2024-06-12 DIAGNOSIS — Z12.31 ENCOUNTER FOR SCREENING MAMMOGRAM FOR MALIGNANT NEOPLASM OF BREAST: Primary | ICD-10-CM

## 2024-06-12 DIAGNOSIS — N89.8 VAGINAL ITCHING: ICD-10-CM

## 2024-06-12 DIAGNOSIS — N89.8 VAGINAL DISCHARGE: ICD-10-CM

## 2024-06-12 PROCEDURE — 99396 PREV VISIT EST AGE 40-64: CPT | Mod: S$GLB,,, | Performed by: OBSTETRICS & GYNECOLOGY

## 2024-06-12 PROCEDURE — 1159F MED LIST DOCD IN RCRD: CPT | Mod: CPTII,S$GLB,, | Performed by: OBSTETRICS & GYNECOLOGY

## 2024-06-12 PROCEDURE — 81514 NFCT DS BV&VAGINITIS DNA ALG: CPT | Performed by: OBSTETRICS & GYNECOLOGY

## 2024-06-12 PROCEDURE — 3075F SYST BP GE 130 - 139MM HG: CPT | Mod: CPTII,S$GLB,, | Performed by: OBSTETRICS & GYNECOLOGY

## 2024-06-12 PROCEDURE — 3079F DIAST BP 80-89 MM HG: CPT | Mod: CPTII,S$GLB,, | Performed by: OBSTETRICS & GYNECOLOGY

## 2024-06-12 PROCEDURE — 3008F BODY MASS INDEX DOCD: CPT | Mod: CPTII,S$GLB,, | Performed by: OBSTETRICS & GYNECOLOGY

## 2024-06-12 PROCEDURE — 99999 PR PBB SHADOW E&M-EST. PATIENT-LVL III: CPT | Mod: PBBFAC,,, | Performed by: OBSTETRICS & GYNECOLOGY

## 2024-06-12 PROCEDURE — 4010F ACE/ARB THERAPY RXD/TAKEN: CPT | Mod: CPTII,S$GLB,, | Performed by: OBSTETRICS & GYNECOLOGY

## 2024-06-12 RX ORDER — ESTRADIOL 0.1 MG/G
1 CREAM VAGINAL
Qty: 8 G | Refills: 11 | Status: SHIPPED | OUTPATIENT
Start: 2024-06-13 | End: 2025-06-13

## 2024-06-12 NOTE — PROGRESS NOTES
SUBJECTIVE:   58 y.o. female   for annual routine Pap and checkup. No LMP recorded. Patient has had a hysterectomy..  She reports vaginal discharge- ranging from clear to cottage cheese discharge. .    She reports it comes and goes. She took Diflucan with no improvement    Past Medical History:   Diagnosis Date    Abnormal Pap smear     repeat normal    Abnormal Pap smear of cervix     Abnormal Pap smear of vagina     Allergy     seasonal    AR (allergic rhinitis)     Hypercalcemia 9/10/2015    Hypertension     IGT (impaired glucose tolerance) 2014    Pneumonia 2016    Primary hyperparathyroidism 1/10/2018     Past Surgical History:   Procedure Laterality Date    ABDOMINAL SURGERY      CHOLECYSTECTOMY          COLONOSCOPY N/A 2016    Procedure: COLONOSCOPY;  Surgeon: Johnny Clayton MD;  [facility]  Service: Endoscopy;  Laterality: N/A;    COLONOSCOPY N/A 2017    Procedure: COLONOSCOPY;  Surgeon: Johnny Clayton MD;  Location: [facility] (4TH FLR);  Service: Endoscopy;  Laterality: N/A;    COLPOSCOPY      DILATION AND CURETTAGE OF UTERUS      HYSTERECTOMY       tahbso     OOPHORECTOMY      PARATHYROIDECTOMY Right 2019    Procedure: PARATHYROIDECTOMY-with intra-op iPTH levels possible subtotal;  Surgeon: Kiko Wilson MD;  Location: [facility] [address] [place] [address];  Service: General;  Laterality: Right;     Social History     Socioeconomic History    Marital status:    Occupational History    Occupation:      Employer: [employer]    Tobacco Use    Smoking status: Never     Passive exposure: Never    Smokeless tobacco: Never   Substance and Sexual Activity    Alcohol use: Yes     Alcohol/week: 0.0 standard drinks of alcohol     Types: 1 Standard drinks or equivalent per week     Comment: socailly    Drug use: No    Sexual activity: Yes     Partners: Male     Birth control/protection: None, See Surgical Hx     Comment: ; hysterectomy    Social History Narrative    No regular exercise     with 1 child     Social Determinants of Health     Financial Resource Strain: Low Risk  (11/12/2023)    Overall Financial Resource Strain (CARDIA)     Difficulty of Paying Living Expenses: Not very hard   Food Insecurity: Food Insecurity Present (11/12/2023)    Hunger Vital Sign     Worried About Running Out of Food in the Last Year: Sometimes true     Ran Out of Food in the Last Year: Sometimes true   Transportation Needs: Unknown (11/12/2023)    PRAPARE - Transportation     Lack of Transportation (Non-Medical): No   Physical Activity: Unknown (11/12/2023)    Exercise Vital Sign     Days of Exercise per Week: 1 day   Stress: No Stress Concern Present (11/12/2023)    [place] [place] [employer] - Occupational Stress Questionnaire     Feeling of Stress : Only a little   Housing Stability: Low Risk  (11/12/2023)    Housing Stability Vital Sign     Unable to Pay for Housing in the Last Year: No     Number of Places Lived in the Last Year: 1     Unstable Housing in the Last Year: No     Family History   Problem Relation Name Age of Onset    No Known Problems Paternal Grandfather      No Known Problems Paternal Grandmother      No Known Problems Maternal Grandmother      No Known Problems Maternal Grandfather      Glaucoma Father      Hypertension Mother      Diabetes Mother      Glaucoma Mother      No Known Problems Brother      Hypertension Sister          four sisters    Breast cancer Sister  44    Breast cancer Sister  65    No Known Problems Maternal Aunt      No Known Problems Maternal Uncle      No Known Problems Paternal Aunt      No Known Problems Paternal Uncle      Thyroid cancer Neg Hx      Cancer Neg Hx      Colon cancer Neg Hx      Ovarian cancer Neg Hx      Melanoma Neg Hx      Psoriasis Neg Hx      Lupus Neg Hx      Amblyopia Neg Hx      Blindness Neg Hx      Cataracts Neg Hx      Macular degeneration Neg Hx      Retinal detachment  Neg Hx      Strabismus Neg Hx      Stroke Neg Hx      Thyroid disease Neg Hx      Esophageal cancer Neg Hx       OB History    Para Term  AB Living   3 2 2   1     SAB IAB Ectopic Multiple Live Births   1              # Outcome Date GA Lbr Leonardo/2nd Weight Sex Type Anes PTL Lv   3 Term      Vag-Spont      2 Term      Vag-Spont      1 SAB                    Current Outpatient Medications   Medication Sig Dispense Refill    amLODIPine (NORVASC) 10 MG tablet Take 1 tablet (10 mg total) by mouth once daily. 90 tablet 2    cholecalciferol, vitamin D3, (VITAMIN D3) 50 mcg (2,000 unit) Cap Take 1 capsule (2,000 Units total) by mouth once daily. (Patient not taking: Reported on 2024)      [START ON 2024] estradioL (ESTRACE) 0.01 % (0.1 mg/gram) vaginal cream Place 1 g vaginally twice a week. 8 g 11    fluconazole (DIFLUCAN) 150 MG Tab Take 150 mg by mouth once. (Patient not taking: Reported on 2024)      furosemide (LASIX) 20 MG tablet Take 1 tablet (20 mg total) by mouth once daily. (Patient not taking: Reported on 2024) 90 tablet 2    hydrOXYzine HCL (ATARAX) 25 MG tablet Take 1 tablet (25 mg total) by mouth 3 (three) times daily. (Patient not taking: Reported on 2023) 45 tablet 1    losartan (COZAAR) 100 MG tablet Take 1 tablet (100 mg total) by mouth every evening. (Patient not taking: Reported on 2024) 90 tablet 2    methylPREDNISolone (MEDROL DOSEPACK) 4 mg tablet use as directed 21 each 0    metoprolol tartrate (LOPRESSOR) 25 MG tablet Take 0.5 tablets (12.5 mg total) by mouth 2 (two) times daily. 90 tablet 2    montelukast (SINGULAIR) 10 mg tablet Take 1 tablet (10 mg total) by mouth every evening. 30 tablet 0    scopolamine (TRANSDERM-SCOP) 1.3-1.5 mg (1 mg over 3 days) Place 1 patch onto the skin every 72 hours. (Patient not taking: Reported on 2024) 10 patch 0    spironolactone (ALDACTONE) 25 MG tablet Take 1 tablet by mouth once daily 90 tablet 1     No current  facility-administered medications for this visit.     Allergies: Sulfa (sulfonamide antibiotics)     The 10-year ASCVD risk score (Tosha BUTCHER, et al., 2019) is: 8.9%    Values used to calculate the score:      Age: 58 years      Sex: Female      Is Non- : Yes      Diabetic: No      Tobacco smoker: No      Systolic Blood Pressure: 135 mmHg      Is BP treated: Yes      HDL Cholesterol: 38 mg/dL      Total Cholesterol: 179 mg/dL      ROS:  Constitutional: no weight loss, weight gain, fever, fatigue  Eyes:  No vision changes, glasses/contacts  ENT/Mouth: No ulcers, sinus problems, ears ringing, headache  Cardiovascular: No inability to lie flat, chest pain, exercise intolerance, swelling, heart palpitations  Respiratory: No wheezing, coughing blood, shortness of breath, or cough  Gastrointestinal: No diarrhea, bloody stool, nausea/vomiting, constipation, gas, hemorrhoids  Genitourinary: No blood in urine, painful urination, urgency of urination, frequency of urination, incomplete emptying, incontinence, abnormal bleeding, painful periods, heavy periods, +vaginal discharge, vaginal odor, painful intercourse, sexual problems, bleeding after intercourse.  Musculoskeletal: No muscle weakness  Skin/Breast: No painful breasts, nipple discharge, masses, rash, ulcers  Neurological: No passing out, seizures, numbness, headache  Endocrine: No diabetes, hypothyroid, hyperthyroid, hot flashes, hair loss, abnormal hair growth, acne  Psychiatric: No depression, crying  Hematologic: No bruises, bleeding, swollen lymph nodes, anemia.      Physical Exam:   Constitutional: She is oriented to person, place, and time. She appears well-developed and well-nourished.      Neck: No tracheal deviation present. No thyromegaly present.    Cardiovascular:       Exam reveals no edema.        Pulmonary/Chest: Effort normal. She exhibits no mass, no tenderness, no deformity and no retraction. Right breast exhibits no inverted  nipple, no mass, no nipple discharge, no skin change, no tenderness, presence, no bleeding and no swelling. Left breast exhibits no inverted nipple, no mass, no nipple discharge, no skin change, no tenderness, presence, no bleeding and no swelling. Breasts are symmetrical.        Abdominal: Soft. She exhibits no distension and no mass. There is no abdominal tenderness. There is no rebound and no guarding. No hernia. Hernia confirmed negative in the left inguinal area.     Genitourinary: Rectum:      No external hemorrhoid.   There is no rash, tenderness or lesion on the right labia. There is no rash, tenderness or lesion on the left labia. No no adexnal prolapse. Right adnexum displays no mass, no tenderness and no fullness. Left adnexum displays no mass, no tenderness and no fullness. No vaginal discharge, tenderness, bleeding, rectocele, cystocele or prolapse of vaginal walls in the vagina. Cervix is absent.Uterus is absent.           Musculoskeletal: Normal range of motion and moves all extremeties. No edema.       Neurological: She is alert and oriented to person, place, and time.    Skin: No rash noted. No erythema. No pallor.    Psychiatric: She has a normal mood and affect. Her behavior is normal. Judgment and thought content normal.     +vaginal discharge    ASSESSMENT:   well woman  Vaginal discharge  PLAN:   Mammogram  Follow up affirm  Counseled her on safety and efficacy of intravaginal estrogen- can help prevent vaginitis  return annually or prn

## 2024-06-14 ENCOUNTER — TELEPHONE (OUTPATIENT)
Dept: HEMATOLOGY/ONCOLOGY | Facility: CLINIC | Age: 59
End: 2024-06-14
Payer: COMMERCIAL

## 2024-06-14 ENCOUNTER — PATIENT MESSAGE (OUTPATIENT)
Dept: OBSTETRICS AND GYNECOLOGY | Facility: CLINIC | Age: 59
End: 2024-06-14
Payer: COMMERCIAL

## 2024-06-14 DIAGNOSIS — B96.89 BACTERIAL VAGINOSIS: Primary | ICD-10-CM

## 2024-06-14 DIAGNOSIS — N76.0 BACTERIAL VAGINOSIS: Primary | ICD-10-CM

## 2024-06-14 RX ORDER — METRONIDAZOLE 7.5 MG/G
1 GEL VAGINAL DAILY
Qty: 70 G | Refills: 0 | Status: SHIPPED | OUTPATIENT
Start: 2024-06-14 | End: 2024-06-19

## 2024-09-17 ENCOUNTER — TELEPHONE (OUTPATIENT)
Dept: ADMINISTRATIVE | Facility: HOSPITAL | Age: 59
End: 2024-09-17
Payer: COMMERCIAL

## 2024-09-17 VITALS — DIASTOLIC BLOOD PRESSURE: 62 MMHG | SYSTOLIC BLOOD PRESSURE: 106 MMHG

## 2024-09-17 NOTE — TELEPHONE ENCOUNTER
Pt reports bp taken on 09/17/24 at 106/62.    Charlotte Mark  Panel Care Coordinator  [facility]  P: [phone]  F: [phone]

## 2024-09-25 ENCOUNTER — LAB VISIT (OUTPATIENT)
Dept: LAB | Facility: OTHER | Age: 59
End: 2024-09-25
Attending: INTERNAL MEDICINE
Payer: COMMERCIAL

## 2024-09-25 ENCOUNTER — OFFICE VISIT (OUTPATIENT)
Dept: INTERNAL MEDICINE | Facility: CLINIC | Age: 59
End: 2024-09-25
Attending: INTERNAL MEDICINE
Payer: COMMERCIAL

## 2024-09-25 VITALS
HEART RATE: 62 BPM | WEIGHT: 163.56 LBS | HEIGHT: 65 IN | DIASTOLIC BLOOD PRESSURE: 86 MMHG | OXYGEN SATURATION: 98 % | SYSTOLIC BLOOD PRESSURE: 132 MMHG | BODY MASS INDEX: 27.25 KG/M2

## 2024-09-25 DIAGNOSIS — D56.3: ICD-10-CM

## 2024-09-25 DIAGNOSIS — I10 ESSENTIAL HYPERTENSION: ICD-10-CM

## 2024-09-25 DIAGNOSIS — N28.0 RENAL INFARCT: ICD-10-CM

## 2024-09-25 DIAGNOSIS — E78.2 MIXED HYPERLIPIDEMIA: ICD-10-CM

## 2024-09-25 DIAGNOSIS — M81.0 OSTEOPOROSIS WITHOUT CURRENT PATHOLOGICAL FRACTURE, UNSPECIFIED OSTEOPOROSIS TYPE: ICD-10-CM

## 2024-09-25 DIAGNOSIS — Z00.00 ANNUAL PHYSICAL EXAM: Primary | ICD-10-CM

## 2024-09-25 DIAGNOSIS — Z00.00 ANNUAL PHYSICAL EXAM: ICD-10-CM

## 2024-09-25 DIAGNOSIS — R73.03 PRE-DIABETES: ICD-10-CM

## 2024-09-25 LAB
ALBUMIN SERPL BCP-MCNC: 4 G/DL (ref 3.5–5.2)
ALP SERPL-CCNC: 83 U/L (ref 55–135)
ALT SERPL W/O P-5'-P-CCNC: 21 U/L (ref 10–44)
ANION GAP SERPL CALC-SCNC: 8 MMOL/L (ref 8–16)
AST SERPL-CCNC: 18 U/L (ref 10–40)
BASOPHILS # BLD AUTO: 0.07 K/UL (ref 0–0.2)
BASOPHILS NFR BLD: 0.9 % (ref 0–1.9)
BILIRUB SERPL-MCNC: 0.3 MG/DL (ref 0.1–1)
BUN SERPL-MCNC: 14 MG/DL (ref 6–20)
CALCIUM SERPL-MCNC: 9.3 MG/DL (ref 8.7–10.5)
CHLORIDE SERPL-SCNC: 107 MMOL/L (ref 95–110)
CHOLEST SERPL-MCNC: 188 MG/DL (ref 120–199)
CHOLEST/HDLC SERPL: 5.1 {RATIO} (ref 2–5)
CO2 SERPL-SCNC: 24 MMOL/L (ref 23–29)
CREAT SERPL-MCNC: 0.9 MG/DL (ref 0.5–1.4)
DIFFERENTIAL METHOD BLD: ABNORMAL
EOSINOPHIL # BLD AUTO: 0.2 K/UL (ref 0–0.5)
EOSINOPHIL NFR BLD: 2.3 % (ref 0–8)
ERYTHROCYTE [DISTWIDTH] IN BLOOD BY AUTOMATED COUNT: 14.7 % (ref 11.5–14.5)
EST. GFR  (NO RACE VARIABLE): >60 ML/MIN/1.73 M^2
ESTIMATED AVG GLUCOSE: 111 MG/DL (ref 68–131)
GLUCOSE SERPL-MCNC: 86 MG/DL (ref 70–110)
HBA1C MFR BLD: 5.5 % (ref 4–5.6)
HCT VFR BLD AUTO: 42.7 % (ref 37–48.5)
HDLC SERPL-MCNC: 37 MG/DL (ref 40–75)
HDLC SERPL: 19.7 % (ref 20–50)
HGB BLD-MCNC: 13.2 G/DL (ref 12–16)
IMM GRANULOCYTES # BLD AUTO: 0.02 K/UL (ref 0–0.04)
IMM GRANULOCYTES NFR BLD AUTO: 0.3 % (ref 0–0.5)
LDLC SERPL CALC-MCNC: 134.6 MG/DL (ref 63–159)
LYMPHOCYTES # BLD AUTO: 4 K/UL (ref 1–4.8)
LYMPHOCYTES NFR BLD: 51.7 % (ref 18–48)
MCH RBC QN AUTO: 23.1 PG (ref 27–31)
MCHC RBC AUTO-ENTMCNC: 30.9 G/DL (ref 32–36)
MCV RBC AUTO: 75 FL (ref 82–98)
MONOCYTES # BLD AUTO: 0.9 K/UL (ref 0.3–1)
MONOCYTES NFR BLD: 12.2 % (ref 4–15)
NEUTROPHILS # BLD AUTO: 2.5 K/UL (ref 1.8–7.7)
NEUTROPHILS NFR BLD: 32.6 % (ref 38–73)
NONHDLC SERPL-MCNC: 151 MG/DL
NRBC BLD-RTO: 0 /100 WBC
PLATELET # BLD AUTO: 292 K/UL (ref 150–450)
PMV BLD AUTO: 9.7 FL (ref 9.2–12.9)
POTASSIUM SERPL-SCNC: 4.1 MMOL/L (ref 3.5–5.1)
PROT SERPL-MCNC: 8.1 G/DL (ref 6–8.4)
RBC # BLD AUTO: 5.71 M/UL (ref 4–5.4)
SODIUM SERPL-SCNC: 139 MMOL/L (ref 136–145)
TRIGL SERPL-MCNC: 82 MG/DL (ref 30–150)
TSH SERPL DL<=0.005 MIU/L-ACNC: 0.92 UIU/ML (ref 0.4–4)
WBC # BLD AUTO: 7.68 K/UL (ref 3.9–12.7)

## 2024-09-25 PROCEDURE — 3079F DIAST BP 80-89 MM HG: CPT | Mod: CPTII,S$GLB,, | Performed by: INTERNAL MEDICINE

## 2024-09-25 PROCEDURE — 1159F MED LIST DOCD IN RCRD: CPT | Mod: CPTII,S$GLB,, | Performed by: INTERNAL MEDICINE

## 2024-09-25 PROCEDURE — 80053 COMPREHEN METABOLIC PANEL: CPT | Performed by: INTERNAL MEDICINE

## 2024-09-25 PROCEDURE — 99396 PREV VISIT EST AGE 40-64: CPT | Mod: S$GLB,,, | Performed by: INTERNAL MEDICINE

## 2024-09-25 PROCEDURE — 1160F RVW MEDS BY RX/DR IN RCRD: CPT | Mod: CPTII,S$GLB,, | Performed by: INTERNAL MEDICINE

## 2024-09-25 PROCEDURE — 80061 LIPID PANEL: CPT | Performed by: INTERNAL MEDICINE

## 2024-09-25 PROCEDURE — 85025 COMPLETE CBC W/AUTO DIFF WBC: CPT | Performed by: INTERNAL MEDICINE

## 2024-09-25 PROCEDURE — 84443 ASSAY THYROID STIM HORMONE: CPT | Performed by: INTERNAL MEDICINE

## 2024-09-25 PROCEDURE — 3075F SYST BP GE 130 - 139MM HG: CPT | Mod: CPTII,S$GLB,, | Performed by: INTERNAL MEDICINE

## 2024-09-25 PROCEDURE — 3044F HG A1C LEVEL LT 7.0%: CPT | Mod: CPTII,S$GLB,, | Performed by: INTERNAL MEDICINE

## 2024-09-25 PROCEDURE — 83036 HEMOGLOBIN GLYCOSYLATED A1C: CPT | Performed by: INTERNAL MEDICINE

## 2024-09-25 PROCEDURE — 4010F ACE/ARB THERAPY RXD/TAKEN: CPT | Mod: CPTII,S$GLB,, | Performed by: INTERNAL MEDICINE

## 2024-09-25 PROCEDURE — 3008F BODY MASS INDEX DOCD: CPT | Mod: CPTII,S$GLB,, | Performed by: INTERNAL MEDICINE

## 2024-09-25 PROCEDURE — 99999 PR PBB SHADOW E&M-EST. PATIENT-LVL III: CPT | Mod: PBBFAC,,, | Performed by: INTERNAL MEDICINE

## 2024-09-25 RX ORDER — ATORVASTATIN CALCIUM 20 MG/1
20 TABLET, FILM COATED ORAL DAILY
Qty: 90 TABLET | Refills: 3 | Status: SHIPPED | OUTPATIENT
Start: 2024-09-25 | End: 2025-09-25

## 2024-10-06 NOTE — PROGRESS NOTES
Subjective:       Patient ID: Pastora Cota is a 58 y.o. female.    Chief Complaint: Annual Exam    Here for annual exam    59 yo with PMHx of HTN, HLD, preDM, hyperparathyroidism, osteoporosis,       ### HTN ###      ## preDM ###               HGBA1C                   5.5                 09/25/2024 04:17 PM        HGBA1C                   5.4                 07/06/2023 11:48 AM        HGBA1C                   5.7 (H)             02/14/2023 04:25 PM        HGBA1C                   5.6                 05/19/2022 04:28 PM        HGBA1C                   5.5                 10/13/2021 04:48 PM     ### Primary hyperparathyroidism ###  Was on HCTZ in past.   She takes 1200 mg calcium daily plus 3-4 dairy servings.   No constipation, bone pain, weakness  On ergo 50 k weekly    -s/p right inferior parathyroidectomy on 12/11/19 for primary hyperparathyroidism   - Dr Wilson   Osteoporosis of the distal 1/3 forearm  No s/s of hypocalcemia         ### Osteoporosis ###   dx on bmd 4/25/19        Last bmd 7/24/20  Impression:  1. Osteoporosis  2. Compared with previous DXA, BMD at the lumbar spine has remained stable, BMD at the left forearm has declined by 5.1%, and the BMD at the total hip has increased by 3.7%.          Review of Systems   Constitutional:  Negative for chills, fatigue, fever and unexpected weight change.   HENT:  Negative for ear pain, hearing loss, postnasal drip, tinnitus, trouble swallowing and voice change.    Respiratory:  Negative for cough, chest tightness, shortness of breath and wheezing.    Cardiovascular:  Negative for chest pain, palpitations and leg swelling.   Gastrointestinal:  Negative for abdominal pain, blood in stool, diarrhea, nausea and vomiting.   Endocrine: Negative for polydipsia, polyphagia and polyuria.   Genitourinary:  Negative for difficulty urinating, dysuria, hematuria and vaginal bleeding.   Skin:  Negative for rash.   Allergic/Immunologic: Negative for food  "allergies.   Neurological:  Negative for dizziness, numbness and headaches.   Hematological:  Does not bruise/bleed easily.   Psychiatric/Behavioral:  The patient is not nervous/anxious.        Objective:      Vitals:    09/25/24 1543   BP: 132/86   BP Location: Right arm   Patient Position: Sitting   Pulse: 62   SpO2: 98%   Weight: 74.2 kg (163 lb 9.3 oz)   Height: 5' 5" (1.651 m)      Physical Exam  Vitals and nursing note reviewed.   Constitutional:       General: She is not in acute distress.     Appearance: Normal appearance. She is well-developed.   HENT:      Head: Normocephalic and atraumatic.      Mouth/Throat:      Pharynx: No oropharyngeal exudate.   Eyes:      General: No scleral icterus.     Conjunctiva/sclera: Conjunctivae normal.      Pupils: Pupils are equal, round, and reactive to light.   Neck:      Thyroid: No thyromegaly.   Cardiovascular:      Rate and Rhythm: Normal rate and regular rhythm.      Heart sounds: Normal heart sounds. No murmur heard.  Pulmonary:      Effort: Pulmonary effort is normal.      Breath sounds: Normal breath sounds. No wheezing or rales.   Abdominal:      General: There is no distension.   Musculoskeletal:         General: No tenderness.   Lymphadenopathy:      Cervical: No cervical adenopathy.   Skin:     General: Skin is warm and dry.   Neurological:      Mental Status: She is alert and oriented to person, place, and time.   Psychiatric:         Behavior: Behavior normal.         Assessment:       1. Annual physical exam    2. Renal infarct    3. Essential hypertension    4. Pre-diabetes    5. Alpha thalassemia 2    6. Osteoporosis without current pathological fracture, unspecified osteoporosis type    7. Mixed hyperlipidemia        Plan:       Pastora was seen today for annual exam.    Diagnoses and all orders for this visit:    Annual physical exam  -     Comprehensive Metabolic Panel; Future  -     Lipid Panel; Future  -     TSH; Future  -     CBC Auto Differential; " Future  -     Hemoglobin A1C; Future    Renal infarct  -     Comprehensive Metabolic Panel; Future    Essential hypertension  -     Comprehensive Metabolic Panel; Future  -     Lipid Panel; Future  -     TSH; Future  -     CBC Auto Differential; Future    Pre-diabetes  -     Hemoglobin A1C; Future    Alpha thalassemia 2  -     CBC Auto Differential; Future    Osteoporosis without current pathological fracture, unspecified osteoporosis type  -     Comprehensive Metabolic Panel; Future    Mixed hyperlipidemia  -     Lipid Panel; Future    Other orders  -     atorvastatin (LIPITOR) 20 MG tablet; Take 1 tablet (20 mg total) by mouth once daily.           Kiko Gutierrez MD  Internal Medicine-[facility]        Side effects of medication(s) were discussed in detail and patient voiced understanding.  Patient will call back for any issues or complications.

## 2024-10-21 ENCOUNTER — PATIENT MESSAGE (OUTPATIENT)
Dept: ADMINISTRATIVE | Facility: HOSPITAL | Age: 59
End: 2024-10-21
Payer: COMMERCIAL

## 2024-10-22 ENCOUNTER — PATIENT OUTREACH (OUTPATIENT)
Dept: ADMINISTRATIVE | Facility: HOSPITAL | Age: 59
End: 2024-10-22
Payer: COMMERCIAL

## 2024-10-23 DIAGNOSIS — I10 ESSENTIAL HYPERTENSION: ICD-10-CM

## 2024-10-23 DIAGNOSIS — I10 HYPERTENSION, UNSPECIFIED TYPE: ICD-10-CM

## 2024-10-23 RX ORDER — FUROSEMIDE 20 MG/1
20 TABLET ORAL DAILY
Qty: 90 TABLET | Refills: 3 | Status: SHIPPED | OUTPATIENT
Start: 2024-10-23

## 2024-10-23 RX ORDER — LOSARTAN POTASSIUM 100 MG/1
100 TABLET ORAL NIGHTLY
Qty: 90 TABLET | Refills: 0 | OUTPATIENT
Start: 2024-10-23

## 2024-10-23 RX ORDER — AMLODIPINE BESYLATE 10 MG/1
10 TABLET ORAL DAILY
Qty: 90 TABLET | Refills: 3 | Status: SHIPPED | OUTPATIENT
Start: 2024-10-23

## 2024-10-23 RX ORDER — METOPROLOL TARTRATE 25 MG/1
12.5 TABLET, FILM COATED ORAL 2 TIMES DAILY
Qty: 90 TABLET | Refills: 3 | Status: SHIPPED | OUTPATIENT
Start: 2024-10-23

## 2024-10-23 NOTE — TELEPHONE ENCOUNTER
Refill Decision Note   Pastora Cota  is requesting a refill authorization.  Brief Assessment and Rationale for Refill:  Quick Discontinue  Approve     Medication Therapy Plan:  Losartan discontinued on 9/25/2024 by Kiko Koch MD      Pharmacist review requested: Yes   Extended chart review required: Yes   Comments:     Note composed:6:08 PM 10/23/2024

## 2024-10-23 NOTE — TELEPHONE ENCOUNTER
Refill Routing Note   Medication(s) are not appropriate for processing by [facility] for the following reason(s):        Drug-drug interaction: Lasix-Aldactone  Drug-disease interaction: Lasix    [facility] action(s):  Defer  Quick Discontinue  Approve        Medication Therapy Plan: Losartan discontinued on 9/25/2024 by Kiko Koch MD; Duplicate Therapy: furosemide, spironolactone; Drug-Disease: furosemide and Hypokalemia    Pharmacist review requested: Yes     Appointments  past 12m or future 3m with PCP    Date Provider   Last Visit   9/25/2024 Kiko Koch MD   Next Visit   3/27/2025 Kiko Koch MD   ED visits in past 90 days: 0        Note composed:3:09 PM 10/23/2024

## 2024-10-23 NOTE — TELEPHONE ENCOUNTER
No care due was identified.  Health [facility] Embedded Care Due Messages. Reference number: [id number].   10/23/2024 7:01:11 AM CDT

## 2024-10-30 ENCOUNTER — OFFICE VISIT (OUTPATIENT)
Dept: OPTOMETRY | Facility: CLINIC | Age: 59
End: 2024-10-30
Payer: COMMERCIAL

## 2024-10-30 DIAGNOSIS — H25.13 NUCLEAR SCLEROSIS OF BOTH EYES: ICD-10-CM

## 2024-10-30 DIAGNOSIS — H43.393 VISUAL FLOATERS, BILATERAL: Primary | ICD-10-CM

## 2024-10-30 DIAGNOSIS — H04.123 DRY EYE SYNDROME OF BOTH EYES: ICD-10-CM

## 2024-10-30 DIAGNOSIS — H52.4 BILATERAL PRESBYOPIA: ICD-10-CM

## 2024-10-30 PROCEDURE — 99999 PR PBB SHADOW E&M-EST. PATIENT-LVL II: CPT | Mod: PBBFAC,,, | Performed by: OPTOMETRIST

## 2024-11-04 ENCOUNTER — PATIENT MESSAGE (OUTPATIENT)
Dept: INTERNAL MEDICINE | Facility: CLINIC | Age: 59
End: 2024-11-04
Payer: COMMERCIAL

## 2024-11-04 DIAGNOSIS — I10 ESSENTIAL HYPERTENSION: ICD-10-CM

## 2024-11-04 NOTE — TELEPHONE ENCOUNTER
No care due was identified.  Health [facility] Embedded Care Due Messages. Reference number: [id number].   11/04/2024 11:33:44 AM CST

## 2024-11-04 NOTE — TELEPHONE ENCOUNTER
"Pt tried to request refill for losartan but was told she no longer takes it. Pt is fine to not take it but stated she did not have any knowledge of her stopping the Losartan.     Please advise.     Pended med from pt med Hx.     Did find this but no explanation. "( discontinued on 9/25/2024 by Kiko Koch MD) "    "

## 2024-11-05 ENCOUNTER — PATIENT MESSAGE (OUTPATIENT)
Dept: INTERNAL MEDICINE | Facility: CLINIC | Age: 59
End: 2024-11-05
Payer: COMMERCIAL

## 2024-11-05 RX ORDER — LOSARTAN POTASSIUM 100 MG/1
100 TABLET ORAL NIGHTLY
Qty: 90 TABLET | Refills: 2 | Status: SHIPPED | OUTPATIENT
Start: 2024-11-05

## 2025-01-15 ENCOUNTER — HOSPITAL ENCOUNTER (OUTPATIENT)
Dept: RADIOLOGY | Facility: OTHER | Age: 60
Discharge: HOME | End: 2025-01-15
Attending: OBSTETRICS & GYNECOLOGY
Payer: COMMERCIAL

## 2025-01-15 DIAGNOSIS — Z12.31 ENCOUNTER FOR SCREENING MAMMOGRAM FOR MALIGNANT NEOPLASM OF BREAST: ICD-10-CM

## 2025-01-16 ENCOUNTER — HOSPITAL ENCOUNTER (OUTPATIENT)
Dept: RADIOLOGY | Facility: OTHER | Age: 60
Discharge: HOME | End: 2025-01-16
Attending: OBSTETRICS & GYNECOLOGY
Payer: COMMERCIAL

## 2025-01-16 PROCEDURE — 77067 SCR MAMMO BI INCL CAD: CPT | Mod: 26,,, | Performed by: RADIOLOGY

## 2025-01-16 PROCEDURE — 77063 BREAST TOMOSYNTHESIS BI: CPT | Mod: 26,,, | Performed by: RADIOLOGY

## 2025-01-16 PROCEDURE — 77067 SCR MAMMO BI INCL CAD: CPT | Mod: TC

## 2025-02-28 DIAGNOSIS — I10 ESSENTIAL HYPERTENSION: ICD-10-CM

## 2025-02-28 RX ORDER — SPIRONOLACTONE 25 MG/1
25 TABLET ORAL
Qty: 90 TABLET | Refills: 1 | Status: SHIPPED | OUTPATIENT
Start: 2025-02-28

## 2025-02-28 NOTE — TELEPHONE ENCOUNTER
Refill Decision Note   Pastora Cota  is requesting a refill authorization.  Brief Assessment and Rationale for Refill:  Approve     Medication Therapy Plan:         Pharmacist review requested: Yes   Comments:     Note composed:10:44 AM 02/28/2025

## 2025-02-28 NOTE — TELEPHONE ENCOUNTER
No care due was identified.  Health [facility] Embedded Care Due Messages. Reference number: [id number].   2/28/2025 7:01:45 AM CST

## 2025-02-28 NOTE — TELEPHONE ENCOUNTER
Refill Routing Note   Medication(s) are not appropriate for processing by [facility] for the following reason(s):        Drug-disease interaction    [facility] action(s):  Defer      Medication Therapy Plan: Renal infarct    Pharmacist review requested: Yes     Appointments  past 12m or future 3m with PCP    Date Provider   Last Visit   9/25/2024 Kiko Koch MD   Next Visit   3/27/2025 Kiko Koch MD   ED visits in past 90 days: 0        Note composed:10:15 AM 02/28/2025

## 2025-03-24 ENCOUNTER — OFFICE VISIT (OUTPATIENT)
Dept: INTERNAL MEDICINE | Facility: CLINIC | Age: 60
End: 2025-03-24
Attending: INTERNAL MEDICINE
Payer: COMMERCIAL

## 2025-03-24 VITALS — DIASTOLIC BLOOD PRESSURE: 67 MMHG | SYSTOLIC BLOOD PRESSURE: 127 MMHG | HEART RATE: 81 BPM

## 2025-03-24 DIAGNOSIS — I10 HYPERTENSION, UNSPECIFIED TYPE: Primary | ICD-10-CM

## 2025-03-24 DIAGNOSIS — E04.2 MULTINODULAR GOITER: ICD-10-CM

## 2025-03-24 DIAGNOSIS — G47.00 INSOMNIA, UNSPECIFIED TYPE: ICD-10-CM

## 2025-03-24 DIAGNOSIS — R73.03 PRE-DIABETES: ICD-10-CM

## 2025-03-24 DIAGNOSIS — M81.0 OSTEOPOROSIS WITHOUT CURRENT PATHOLOGICAL FRACTURE, UNSPECIFIED OSTEOPOROSIS TYPE: ICD-10-CM

## 2025-03-24 PROCEDURE — 1160F RVW MEDS BY RX/DR IN RCRD: CPT | Mod: CPTII,95,, | Performed by: INTERNAL MEDICINE

## 2025-03-24 PROCEDURE — 3078F DIAST BP <80 MM HG: CPT | Mod: CPTII,95,, | Performed by: INTERNAL MEDICINE

## 2025-03-24 PROCEDURE — 4010F ACE/ARB THERAPY RXD/TAKEN: CPT | Mod: CPTII,95,, | Performed by: INTERNAL MEDICINE

## 2025-03-24 PROCEDURE — 3074F SYST BP LT 130 MM HG: CPT | Mod: CPTII,95,, | Performed by: INTERNAL MEDICINE

## 2025-03-24 PROCEDURE — G2211 COMPLEX E/M VISIT ADD ON: HCPCS | Mod: 95,,, | Performed by: INTERNAL MEDICINE

## 2025-03-24 PROCEDURE — 98006 SYNCH AUDIO-VIDEO EST MOD 30: CPT | Mod: 95,,, | Performed by: INTERNAL MEDICINE

## 2025-03-24 PROCEDURE — 1159F MED LIST DOCD IN RCRD: CPT | Mod: CPTII,95,, | Performed by: INTERNAL MEDICINE

## 2025-03-24 RX ORDER — SCOPOLAMINE 1 MG/3D
1 PATCH, EXTENDED RELEASE TRANSDERMAL
COMMUNITY
Start: 2025-01-10

## 2025-03-24 RX ORDER — TRAZODONE HYDROCHLORIDE 50 MG/1
50-100 TABLET ORAL NIGHTLY
Qty: 30 TABLET | Refills: 0 | Status: SHIPPED | OUTPATIENT
Start: 2025-03-24 | End: 2026-03-24

## 2025-03-24 NOTE — PROGRESS NOTES
Subjective:       Patient ID: Pastora Cota is a 59 y.o. female.    Chief Complaint: Hypertension (6 month f/u)    The patient location is: Home [place]   The chief complaint leading to consultation is: 6 month htn f/u  Visit type: audiovisual  Total time spent with patient:  15 Minutes  Each patient to whom he or she provides medical services by telemedicine is:  (1) informed of the relationship between the physician and patient and the respective role of any other health care provider with respect to management of the patient; and (2) notified that he or she may decline to receive medical services by telemedicine and may withdraw from such care at any time, and (3) potential limitations of virtual visits and the risk that ensues.       57 yo with PMHx of HTN, HLD, preDM, hyperparathyroidism, osteoporosis,      IM HPI 3/24/25   DEXA due 11/2025. Endocrine has ordered.  BP controlled. She is exercising lately.  Vaccines discussed.  Trouble sleeping as of late. Ruminating thoughts.         ### HTN ###   spironolactone 25; metoprolol 12.5BID; losartan 100mg, amlodipine 10mg, lasix 20mg     ## preDM ###                        HGBA1C                   5.5                 09/25/2024 04:17 PM        HGBA1C                   5.4                 07/06/2023 11:48 AM        HGBA1C                   5.7 (H)             02/14/2023 04:25 PM        HGBA1C                   5.6                 05/19/2022 04:28 PM        HGBA1C                   5.5                 10/13/2021 04:48 PM        HGBA1C                   5.6                 01/28/2021 04:00 PM        HGBA1C                   5.8 (H)             07/24/2020 10:40 AM        HGBA1C                   5.5                 04/07/2019 12:16 PM        HGBA1C                   5.8 (H)             03/07/2019 09:39 AM        HGBA1C                   5.7 (H)             08/15/2018 03:15 PM        ### Primary hyperparathyroidism ###  Was on HCTZ in past.   She takes 1200  mg calcium daily plus 3-4 dairy servings.   No constipation, bone pain, weakness  On ergo 50 k weekly    -s/p right inferior parathyroidectomy on 12/11/19 for primary hyperparathyroidism   - Dr Wilson   Osteoporosis of the distal 1/3 forearm  No s/s of hypocalcemia         ### Osteoporosis ###   dx on bmd 4/25/19        DXA (7/24/20) Osteoporosis; Compared with previous DXA, BMD at the lumbar spine has remained stable, BMD at the left forearm has declined by 5.1%, and the BMD at the total hip has increased by 3.7%.  DXA (05/02/2023) Compared with previous DXA, BMD at the lumbar spine has increased 7%, and BMD at the total hip has remained stable.            History of Present Illness    CHIEF COMPLAINT:  Pastora presents today for routine follow-up    SLEEP:  She reports difficulty falling asleep due to ruminating thoughts about next day's tasks. She experiences prolonged sleep onset latency and middle-of-the-night awakenings. She denies awakenings due to bladder issues, back pain, breathing difficulties, choking sensations, or heartburn. She was previously prescribed Hydroxyzine for sleep issues but has discontinued use.    MEDICAL HISTORY:  She has a history of parathyroid issues requiring regular electrolyte monitoring and pneumonia with hospitalization seven years ago.    MEDICATIONS:  She is currently taking blood pressure medications for hypertension management.      ROS:  General: -fever, -chills, -fatigue, -weight gain, -weight loss  Eyes: -vision changes, -redness, -discharge  ENT: -ear pain, -nasal congestion, -sore throat  Cardiovascular: -chest pain, -palpitations, -lower extremity edema  Respiratory: -cough, -shortness of breath  Gastrointestinal: -abdominal pain, -nausea, -vomiting, -diarrhea, -constipation, -blood in stool  Genitourinary: -dysuria, -hematuria, -frequency  Musculoskeletal: -joint pain, -muscle pain  Skin: -rash, -lesion  Neurological: -headache, -dizziness, -numbness,  -tingling  Psychiatric: -anxiety, -depression, +sleep difficulty, +racing thoughts, +difficulty staying asleep, +difficulty falling asleep         Review of Systems   Constitutional:  Negative for activity change, chills, fatigue, fever and unexpected weight change.   HENT:  Negative for ear pain, hearing loss, postnasal drip, rhinorrhea, tinnitus, trouble swallowing and voice change.    Eyes:  Negative for discharge and visual disturbance.   Respiratory:  Negative for cough, chest tightness, shortness of breath and wheezing.    Cardiovascular:  Negative for chest pain, palpitations and leg swelling.   Gastrointestinal:  Negative for abdominal pain, blood in stool, constipation, diarrhea, nausea and vomiting.   Endocrine: Negative for polydipsia, polyphagia and polyuria.   Genitourinary:  Negative for difficulty urinating, dysuria, hematuria, menstrual problem and vaginal bleeding.   Musculoskeletal:  Negative for arthralgias, joint swelling and neck pain.   Skin:  Negative for rash.   Allergic/Immunologic: Negative for food allergies.   Neurological:  Negative for dizziness, weakness, numbness and headaches.   Hematological:  Does not bruise/bleed easily.   Psychiatric/Behavioral:  Negative for confusion and dysphoric mood. The patient is not nervous/anxious.        Objective:      Vitals:    03/24/25 1538   BP: 127/67   Pulse: 81      Physical Exam  Vitals and nursing note reviewed.   Constitutional:       General: She is not in acute distress.     Appearance: Normal appearance. She is well-developed.   HENT:      Head: Normocephalic and atraumatic.      Mouth/Throat:      Pharynx: No oropharyngeal exudate.   Eyes:      General: No scleral icterus.     Conjunctiva/sclera: Conjunctivae normal.      Pupils: Pupils are equal, round, and reactive to light.   Neck:      Thyroid: No thyromegaly.   Cardiovascular:      Rate and Rhythm: Normal rate and regular rhythm.      Heart sounds: Normal heart sounds. No murmur  heard.  Pulmonary:      Effort: Pulmonary effort is normal.      Breath sounds: Normal breath sounds. No wheezing or rales.   Abdominal:      General: There is no distension.   Musculoskeletal:         General: No tenderness.   Lymphadenopathy:      Cervical: No cervical adenopathy.   Skin:     General: Skin is warm and dry.   Neurological:      Mental Status: She is alert and oriented to person, place, and time.   Psychiatric:         Behavior: Behavior normal.         Assessment:       1. Hypertension, unspecified type    2. Pre-diabetes    3. Osteoporosis without current pathological fracture, unspecified osteoporosis type    4. Multinodular goiter    5. Insomnia, unspecified type        Plan:       Pastora was seen today for hypertension.    Diagnoses and all orders for this visit:    Hypertension, unspecified type  -     Comprehensive Metabolic Panel; Future    Pre-diabetes  -     Hemoglobin A1C; Future    Osteoporosis without current pathological fracture, unspecified osteoporosis type   Prt aware DXA due 11/2025  -     Vitamin D; Future    Multinodular goiter    Insomnia, unspecified type  We discussed non pharmacological methods of stress reduction and sleep hygiene including but not limited to regular exercise, minimize caffeine consumption, a regular sleep schedule, healthy eating, meditation, breathing techniques, counseling, daily medication to treat underlying stress.  -     traZODone (DESYREL) 50 MG tablet; Take 1-2 tablets ( mg total) by mouth every evening.           Assessment & Plan    INSOMNIA:  - Evaluated sleep issues related to ruminating thoughts, not physical symptoms.  - Provided information on potential long-term cognitive health concerns with certain sleep medications.  - Prescribed trazodone for sleep aid: Take as needed for sleep. Try half tablet if experiencing grogginess the next day. May increase to 2 tablets if 1 tablet is ineffective after multiple nights.  - Discontinued  hydroxyzine as sleep aid.  - Instructed the patient to report difficulty falling asleep and waking up in the middle of the night due to ruminating thoughts about tasks for the next day.  - Recommend follow-up to assess the effectiveness of the prescribed treatment.    HYPERTENSION:  - Monitored the patient's blood pressure, which was found to be 127/67.  - Evaluated the blood pressure as excellent at 127/67.  - Acknowledged that the patient is on multiple medications to maintain blood pressure control.  - Continued current blood pressure medications.    HYPERPARATHYROIDISM:  - Reviewed bone density scan results from 2023, noting improvement.  - Scheduled follow-up at the end of the year for bone density scan as ordered by Dr. Kaba.  - Instructed the patient to complete lab work at [facility] within the next several weeks (not urgent).  - Acknowledged the need for more frequent labs due to the parathyroid condition.    HISTORY OF PNEUMONIA:  - Evaluated pneumonia vaccine (Prevnar 20) recommendation for the patient's age group, weighing benefits against individual risk factors.  - Explained recent change in pneumonia vaccine (Prevnar 20) recommendations for 50-65 age group.  - Noted the patient's history of hospitalization for pneumonia 7 years ago.  - Recommend the Prevnar 20 pneumonia vaccine, which is now available for the patient's age group.    GENERAL HEALTH MAINTENANCE:  - Pastora to continue current exercise routine and maintain exercise habit, even if unable to follow usual gym routine.  - Noted upcoming tetanus vaccine due towards end of year.  - Follow up towards end of year for tetanus vaccine (Tdap), possibly with flu and COVID vaccines.  - Discussed risk-benefit analysis of vaccines, emphasizing proven safety and efficacy.  - Follow up for annual visit.  - Mentioned new physician's assistant, Juan C Salmon, if needed.  - Contact the office to schedule with Abeab Ocampo [place]  appointments if necessary.         This note was generated with the assistance of ambient listening technology. Verbal consent was obtained by the patient and accompanying visitor(s) for the recording of patient appointment to facilitate this note. I attest to having reviewed and edited the generated note for accuracy, though some syntax or spelling errors may persist. Please contact the author of this note for any clarification.    Visit today is associated with current or anticipated ongoing medical care related to this patient's single serious condition/complex condition of hyperparathyroidism, HTN, . The patient will return to see me as these issues will be followed longitudinally.    Kiko Gutierrez MD  Internal Medicine-[facility]        Side effects of medication(s) were discussed in detail and patient voiced understanding.  Patient will call back for any issues or complications.

## 2025-05-08 ENCOUNTER — TELEPHONE (OUTPATIENT)
Dept: OBSTETRICS AND GYNECOLOGY | Facility: CLINIC | Age: 60
End: 2025-05-08
Payer: COMMERCIAL

## 2025-05-08 NOTE — TELEPHONE ENCOUNTER
----- Message from Katerin sent at 5/8/2025  3:14 PM CDT -----  Pt called wanted to schedule her Annual exam pt said she received a letter in the mail she can be reached at [phone]

## 2025-05-12 ENCOUNTER — TELEPHONE (OUTPATIENT)
Dept: ENDOCRINOLOGY | Facility: CLINIC | Age: 60
End: 2025-05-12
Payer: COMMERCIAL

## 2025-05-12 ENCOUNTER — TELEPHONE (OUTPATIENT)
Dept: INTERNAL MEDICINE | Facility: CLINIC | Age: 60
End: 2025-05-12
Payer: COMMERCIAL

## 2025-05-12 DIAGNOSIS — M81.0 OSTEOPOROSIS WITHOUT CURRENT PATHOLOGICAL FRACTURE, UNSPECIFIED OSTEOPOROSIS TYPE: Primary | ICD-10-CM

## 2025-05-12 NOTE — TELEPHONE ENCOUNTER
----- Message from BeckyInforsamra sent at 5/12/2025 10:52 AM CDT -----  Type: General Call Back Name of Caller:ptWould the patient rather a call back or a response via [facility]? call[facility] Call Back Number:[phone] Additional Information: Pt would like a call back if she has to fast for cmp, A1c labs.

## 2025-06-17 ENCOUNTER — HOSPITAL ENCOUNTER (OUTPATIENT)
Dept: RADIOLOGY | Facility: CLINIC | Age: 60
Discharge: HOME | End: 2025-06-17
Attending: INTERNAL MEDICINE
Payer: COMMERCIAL

## 2025-06-17 DIAGNOSIS — M81.0 OSTEOPOROSIS WITHOUT CURRENT PATHOLOGICAL FRACTURE, UNSPECIFIED OSTEOPOROSIS TYPE: ICD-10-CM

## 2025-06-17 PROCEDURE — 77080 DXA BONE DENSITY AXIAL: CPT | Mod: 26,,, | Performed by: INTERNAL MEDICINE

## 2025-06-17 PROCEDURE — 77092 TBS I&R FX RSK QHP: CPT | Mod: S$GLB,,, | Performed by: INTERNAL MEDICINE

## 2025-06-17 PROCEDURE — 77091 TBS TECHL CALCULATION ONLY: CPT

## 2025-07-09 ENCOUNTER — HOSPITAL ENCOUNTER (OUTPATIENT)
Dept: RADIOLOGY | Facility: OTHER | Age: 60
Discharge: HOME | End: 2025-07-09
Attending: INTERNAL MEDICINE
Payer: COMMERCIAL

## 2025-07-09 ENCOUNTER — OFFICE VISIT (OUTPATIENT)
Dept: INTERNAL MEDICINE | Facility: CLINIC | Age: 60
End: 2025-07-09
Attending: INTERNAL MEDICINE
Payer: COMMERCIAL

## 2025-07-09 VITALS
HEART RATE: 80 BPM | WEIGHT: 162.94 LBS | OXYGEN SATURATION: 96 % | HEIGHT: 65 IN | DIASTOLIC BLOOD PRESSURE: 74 MMHG | BODY MASS INDEX: 27.15 KG/M2 | SYSTOLIC BLOOD PRESSURE: 124 MMHG

## 2025-07-09 DIAGNOSIS — I10 ESSENTIAL HYPERTENSION: ICD-10-CM

## 2025-07-09 DIAGNOSIS — E78.2 MIXED HYPERLIPIDEMIA: ICD-10-CM

## 2025-07-09 DIAGNOSIS — R07.9 CHEST PAIN, UNSPECIFIED TYPE: Primary | ICD-10-CM

## 2025-07-09 DIAGNOSIS — R07.9 CHEST PAIN, UNSPECIFIED TYPE: ICD-10-CM

## 2025-07-09 PROCEDURE — G2211 COMPLEX E/M VISIT ADD ON: HCPCS | Mod: S$GLB,,, | Performed by: INTERNAL MEDICINE

## 2025-07-09 PROCEDURE — 1160F RVW MEDS BY RX/DR IN RCRD: CPT | Mod: CPTII,S$GLB,, | Performed by: INTERNAL MEDICINE

## 2025-07-09 PROCEDURE — 99214 OFFICE O/P EST MOD 30 MIN: CPT | Mod: S$GLB,,, | Performed by: INTERNAL MEDICINE

## 2025-07-09 PROCEDURE — 3008F BODY MASS INDEX DOCD: CPT | Mod: CPTII,S$GLB,, | Performed by: INTERNAL MEDICINE

## 2025-07-09 PROCEDURE — 1159F MED LIST DOCD IN RCRD: CPT | Mod: CPTII,S$GLB,, | Performed by: INTERNAL MEDICINE

## 2025-07-09 PROCEDURE — 3078F DIAST BP <80 MM HG: CPT | Mod: CPTII,S$GLB,, | Performed by: INTERNAL MEDICINE

## 2025-07-09 PROCEDURE — 71046 X-RAY EXAM CHEST 2 VIEWS: CPT | Mod: 26,,, | Performed by: RADIOLOGY

## 2025-07-09 PROCEDURE — 3074F SYST BP LT 130 MM HG: CPT | Mod: CPTII,S$GLB,, | Performed by: INTERNAL MEDICINE

## 2025-07-09 PROCEDURE — 71046 X-RAY EXAM CHEST 2 VIEWS: CPT | Mod: TC,FY

## 2025-07-09 PROCEDURE — 4010F ACE/ARB THERAPY RXD/TAKEN: CPT | Mod: CPTII,S$GLB,, | Performed by: INTERNAL MEDICINE

## 2025-07-09 PROCEDURE — 99999 PR PBB SHADOW E&M-EST. PATIENT-LVL IV: CPT | Mod: PBBFAC,,, | Performed by: INTERNAL MEDICINE

## 2025-07-09 NOTE — PROGRESS NOTES
"Subjective:       Patient ID: Pastora Cota is a 59 y.o. female.    Chief Complaint: Chest Pain and Abdominal Pain    Here for urgent visit    48 hours prior developed epigastric pain and chest pain. Took gas X and no relief. No relief with belching. Last hours at a time. Worsens when she cough or bends or twists a certain way. No SOB or tachycardia. No F/C, cough. Not exertional.     History of Present Illness              Review of Systems    Objective:      Vitals:    07/09/25 1452   BP: 124/74   BP Location: Left arm   Patient Position: Sitting   Pulse: 80   SpO2: 96%   Weight: 73.9 kg (162 lb 14.7 oz)   Height: 5' 5" (1.651 m)      Physical Exam  Vitals and nursing note reviewed.   Constitutional:       General: She is not in acute distress.     Appearance: Normal appearance. She is well-developed.   HENT:      Head: Normocephalic and atraumatic.      Mouth/Throat:      Pharynx: No oropharyngeal exudate.   Eyes:      General: No scleral icterus.     Conjunctiva/sclera: Conjunctivae normal.      Pupils: Pupils are equal, round, and reactive to light.   Neck:      Thyroid: No thyromegaly.   Cardiovascular:      Rate and Rhythm: Normal rate and regular rhythm.      Heart sounds: Normal heart sounds. No murmur heard.  Pulmonary:      Effort: Pulmonary effort is normal.      Breath sounds: Normal breath sounds. No wheezing or rales.   Abdominal:      General: There is no distension.   Musculoskeletal:         General: No tenderness.   Lymphadenopathy:      Cervical: No cervical adenopathy.   Skin:     General: Skin is warm and dry.   Neurological:      Mental Status: She is alert and oriented to person, place, and time.   Psychiatric:         Behavior: Behavior normal.         Assessment:       1. Chest pain, unspecified type        Plan:       Pastora was seen today for chest pain and abdominal pain.    Diagnoses and all orders for this visit:    Chest pain, unspecified type   HPI consistent with MSK. Gayla " differential discussed. Office and Emergency Department prompts discussed.  -     X-Ray Chest PA And Lateral; Future    HTN   Controlled and asymptomatic.  Continue current Rx regimen.    HLD    Visit today is associated with current or anticipated ongoing medical care related to this patient's single serious condition/complex condition of HTN, HLD. The patient will return to see me as these issues will be followed longitudinally.           Assessment & Plan              This note was generated with the assistance of ambient listening technology. Verbal consent was obtained by the patient and accompanying visitor(s) for the recording of patient appointment to facilitate this note. I attest to having reviewed and edited the generated note for accuracy, though some syntax or spelling errors may persist. Please contact the author of this note for any clarification.      Kiko Gutierrez MD  Internal Medicine-[facility]        Side effects of medication(s) were discussed in detail and patient voiced understanding.  Patient will call back for any issues or complications.

## 2025-07-11 ENCOUNTER — PATIENT MESSAGE (OUTPATIENT)
Dept: INTERNAL MEDICINE | Facility: CLINIC | Age: 60
End: 2025-07-11
Payer: COMMERCIAL

## 2025-07-11 NOTE — TELEPHONE ENCOUNTER
Please Advise Spoke with the patient she was concerned about which medication that she can take for the chest pain that she spoke with you on the 9th of July about she didn't want to come in to see a provider because you told her that it may take about a month to go away

## 2025-08-05 DIAGNOSIS — I10 ESSENTIAL HYPERTENSION: ICD-10-CM

## 2025-08-05 RX ORDER — LOSARTAN POTASSIUM 100 MG/1
100 TABLET ORAL NIGHTLY
Qty: 90 TABLET | Refills: 0 | Status: SHIPPED | OUTPATIENT
Start: 2025-08-05

## 2025-08-05 NOTE — TELEPHONE ENCOUNTER
Refill Decision Note   Pastora Cipriano  is requesting a refill authorization.  Brief Assessment and Rationale for Refill:  Approve     Medication Therapy Plan:        Comments:     Note composed:5:36 PM 08/05/2025

## 2025-08-05 NOTE — TELEPHONE ENCOUNTER
No care due was identified.  Health [facility] Embedded Care Due Messages. Reference number: [id number].   8/05/2025 7:01:10 AM CDT

## 2025-08-21 ENCOUNTER — OFFICE VISIT (OUTPATIENT)
Dept: OBSTETRICS AND GYNECOLOGY | Facility: CLINIC | Age: 60
End: 2025-08-21
Attending: OBSTETRICS & GYNECOLOGY
Payer: COMMERCIAL

## 2025-08-21 VITALS
BODY MASS INDEX: 26.99 KG/M2 | HEIGHT: 65 IN | DIASTOLIC BLOOD PRESSURE: 81 MMHG | WEIGHT: 162 LBS | HEART RATE: 92 BPM | SYSTOLIC BLOOD PRESSURE: 153 MMHG

## 2025-08-21 DIAGNOSIS — Z01.419 ENCOUNTER FOR GYNECOLOGICAL EXAMINATION WITHOUT ABNORMAL FINDING: ICD-10-CM

## 2025-08-21 DIAGNOSIS — R19.00 PELVIC FULLNESS IN FEMALE: Primary | ICD-10-CM

## 2025-08-21 DIAGNOSIS — N89.8 VAGINAL LESION: ICD-10-CM

## 2025-08-21 DIAGNOSIS — N89.8 VAGINAL DISCHARGE: ICD-10-CM

## 2025-08-21 PROCEDURE — 99999 PR PBB SHADOW E&M-EST. PATIENT-LVL III: CPT | Mod: PBBFAC,,, | Performed by: OBSTETRICS & GYNECOLOGY

## 2025-08-21 PROCEDURE — 87529 HSV DNA AMP PROBE: CPT | Mod: 59 | Performed by: OBSTETRICS & GYNECOLOGY

## 2025-08-26 ENCOUNTER — PATIENT MESSAGE (OUTPATIENT)
Dept: HEMATOLOGY/ONCOLOGY | Facility: CLINIC | Age: 60
End: 2025-08-26
Payer: COMMERCIAL

## 2025-08-29 ENCOUNTER — HOSPITAL ENCOUNTER (OUTPATIENT)
Dept: RADIOLOGY | Facility: OTHER | Age: 60
Discharge: HOME | End: 2025-08-29
Attending: OBSTETRICS & GYNECOLOGY
Payer: COMMERCIAL

## 2025-09-01 ENCOUNTER — TELEPHONE (OUTPATIENT)
Dept: OBSTETRICS AND GYNECOLOGY | Facility: CLINIC | Age: 60
End: 2025-09-01
Payer: COMMERCIAL

## 2025-09-01 RX ORDER — METRONIDAZOLE 7.5 MG/G
1 GEL VAGINAL DAILY
Qty: 70 G | Refills: 1 | Status: SHIPPED | OUTPATIENT
Start: 2025-09-01 | End: 2025-09-06

## 2025-09-01 RX ORDER — FLUCONAZOLE 150 MG/1
150 TABLET ORAL ONCE
Qty: 1 TABLET | Refills: 1 | Status: SHIPPED | OUTPATIENT
Start: 2025-09-01 | End: 2025-09-01

## 2025-09-02 ENCOUNTER — TELEPHONE (OUTPATIENT)
Dept: GYNECOLOGIC ONCOLOGY | Facility: CLINIC | Age: 60
End: 2025-09-02
Payer: COMMERCIAL

## (undated) DEVICE — APPLIER LIGACLIP SM 9.38IN

## (undated) DEVICE — DRAPE THYROID WITH ARMBOARD

## (undated) DEVICE — HEMOSTAT SURGICEL FIBRLR 1X2IN

## (undated) DEVICE — SUT VICRYL 3-0 27 SH

## (undated) DEVICE — SUT 3-0 12-18IN SILK

## (undated) DEVICE — SPONGE GAUZE 16PLY 4X4

## (undated) DEVICE — ELECTRODE REM PLYHSV RETURN 9

## (undated) DEVICE — CHLORAPREP 10.5 ML APPLICATOR

## (undated) DEVICE — SUT 2-0 12-18IN SILK

## (undated) DEVICE — DRESSING TELFA PAD N ADH 2X3

## (undated) DEVICE — SHEARS HARMONIC CRVD 9 CM

## (undated) DEVICE — NDL 22GA X1 1/2 REG BEVEL

## (undated) DEVICE — TRAY MINOR GEN SURG

## (undated) DEVICE — CONTAINER SPECIMEN STRL 4OZ

## (undated) DEVICE — DRESSING TRANS 4X4 TEGADERM

## (undated) DEVICE — ADHESIVE DERMABOND ADVANCED

## (undated) DEVICE — SUT MCRYL PLUS 4-0 PS2 27IN

## (undated) DEVICE — SEE MEDLINE ITEM 157117

## (undated) DEVICE — SYR 10CC LUER LOCK

## (undated) DEVICE — SEE MEDLINE ITEM 157194

## (undated) DEVICE — ELECTRODE BLADE INSULATED 1 IN

## (undated) DEVICE — SUT 4-0 12-18IN SILK BLACK